# Patient Record
Sex: MALE | Race: WHITE | NOT HISPANIC OR LATINO
[De-identification: names, ages, dates, MRNs, and addresses within clinical notes are randomized per-mention and may not be internally consistent; named-entity substitution may affect disease eponyms.]

---

## 2016-04-20 RX ORDER — INSULIN LISPRO 100/ML
15 VIAL (ML) SUBCUTANEOUS
Qty: 0 | Refills: 0 | DISCHARGE
Start: 2016-04-20 | End: 2016-05-20

## 2016-04-20 RX ORDER — INSULIN LISPRO 100/ML
20 VIAL (ML) SUBCUTANEOUS
Qty: 0 | Refills: 0 | DISCHARGE
Start: 2016-04-20 | End: 2016-05-20

## 2016-04-20 RX ORDER — INSULIN GLARGINE 100 [IU]/ML
60 INJECTION, SOLUTION SUBCUTANEOUS
Qty: 0 | Refills: 0 | DISCHARGE
Start: 2016-04-20 | End: 2016-05-20

## 2016-04-20 RX ORDER — INSULIN GLARGINE 100 [IU]/ML
40 INJECTION, SOLUTION SUBCUTANEOUS
Qty: 0 | Refills: 0 | DISCHARGE
Start: 2016-04-20 | End: 2016-05-20

## 2016-04-20 RX ORDER — INSULIN LISPRO 100/ML
15 VIAL (ML) SUBCUTANEOUS
Qty: 0 | Refills: 0 | COMMUNITY
Start: 2016-04-20 | End: 2016-05-20

## 2016-04-20 RX ORDER — INSULIN GLARGINE 100 [IU]/ML
55 INJECTION, SOLUTION SUBCUTANEOUS
Qty: 0 | Refills: 0 | DISCHARGE
Start: 2016-04-20 | End: 2016-05-20

## 2016-04-20 RX ORDER — INSULIN GLARGINE 100 [IU]/ML
45 INJECTION, SOLUTION SUBCUTANEOUS
Qty: 0 | Refills: 0 | DISCHARGE
Start: 2016-04-20 | End: 2016-05-20

## 2016-04-20 RX ORDER — INSULIN LISPRO 100/ML
16 VIAL (ML) SUBCUTANEOUS
Qty: 0 | Refills: 0 | DISCHARGE
Start: 2016-04-20 | End: 2016-05-20

## 2017-01-04 ENCOUNTER — APPOINTMENT (OUTPATIENT)
Dept: INTERNAL MEDICINE | Facility: CLINIC | Age: 62
End: 2017-01-04

## 2017-01-04 RX ORDER — AZITHROMYCIN 250 MG/1
250 TABLET, FILM COATED ORAL
Qty: 6 | Refills: 0 | Status: DISCONTINUED | COMMUNITY
Start: 2016-11-01

## 2017-02-03 ENCOUNTER — APPOINTMENT (OUTPATIENT)
Dept: INTERNAL MEDICINE | Facility: CLINIC | Age: 62
End: 2017-02-03

## 2017-03-03 ENCOUNTER — APPOINTMENT (OUTPATIENT)
Dept: INTERNAL MEDICINE | Facility: CLINIC | Age: 62
End: 2017-03-03

## 2017-04-04 ENCOUNTER — APPOINTMENT (OUTPATIENT)
Dept: INTERNAL MEDICINE | Facility: CLINIC | Age: 62
End: 2017-04-04

## 2017-05-01 ENCOUNTER — APPOINTMENT (OUTPATIENT)
Dept: INTERNAL MEDICINE | Facility: CLINIC | Age: 62
End: 2017-05-01

## 2017-05-01 RX ORDER — PREDNISOLONE ACETATE 10 MG/ML
1 SUSPENSION/ DROPS OPHTHALMIC
Qty: 5 | Refills: 0 | Status: DISCONTINUED | COMMUNITY
Start: 2017-02-08

## 2017-05-01 RX ORDER — AMOXICILLIN 500 MG/1
500 CAPSULE ORAL
Qty: 21 | Refills: 0 | Status: DISCONTINUED | COMMUNITY
Start: 2017-03-07

## 2017-05-01 RX ORDER — CLOTRIMAZOLE 10 MG/G
1 CREAM TOPICAL
Qty: 28 | Refills: 0 | Status: DISCONTINUED | COMMUNITY
Start: 2017-04-14

## 2017-05-30 ENCOUNTER — APPOINTMENT (OUTPATIENT)
Dept: INTERNAL MEDICINE | Facility: CLINIC | Age: 62
End: 2017-05-30

## 2017-06-29 ENCOUNTER — APPOINTMENT (OUTPATIENT)
Dept: INTERNAL MEDICINE | Facility: CLINIC | Age: 62
End: 2017-06-29

## 2017-07-28 ENCOUNTER — RX RENEWAL (OUTPATIENT)
Age: 62
End: 2017-07-28

## 2017-07-28 ENCOUNTER — APPOINTMENT (OUTPATIENT)
Dept: INTERNAL MEDICINE | Facility: CLINIC | Age: 62
End: 2017-07-28
Payer: SELF-PAY

## 2017-07-28 PROCEDURE — 99499D: CUSTOM

## 2017-08-29 ENCOUNTER — APPOINTMENT (OUTPATIENT)
Dept: INTERNAL MEDICINE | Facility: CLINIC | Age: 62
End: 2017-08-29
Payer: SELF-PAY

## 2017-08-29 ENCOUNTER — LABORATORY RESULT (OUTPATIENT)
Age: 62
End: 2017-08-29

## 2017-08-29 PROCEDURE — 99499D: CUSTOM

## 2017-09-05 LAB
AMPHET UR-MCNC: NEGATIVE
BARBITURATES UR-MCNC: NEGATIVE
BENZODIAZ UR-MCNC: NEGATIVE
COCAINE METAB.OTHER UR-MCNC: NEGATIVE
CREATININE, URINE: 132.3 MG/DL
METHADONE UR-MCNC: NEGATIVE
METHAQUALONE UR-MCNC: NEGATIVE
OPIATES UR-MCNC: NEGATIVE
PCP UR-MCNC: NEGATIVE
PROPOXYPH UR QL: NEGATIVE
THC UR QL: NORMAL

## 2017-09-07 LAB — SUBOXONE: NORMAL

## 2017-09-29 ENCOUNTER — APPOINTMENT (OUTPATIENT)
Dept: INTERNAL MEDICINE | Facility: CLINIC | Age: 62
End: 2017-09-29
Payer: SELF-PAY

## 2017-09-29 PROCEDURE — 99499D: CUSTOM

## 2017-10-30 ENCOUNTER — APPOINTMENT (OUTPATIENT)
Dept: INTERNAL MEDICINE | Facility: CLINIC | Age: 62
End: 2017-10-30
Payer: SELF-PAY

## 2017-10-30 PROCEDURE — 99499D: CUSTOM

## 2017-11-27 ENCOUNTER — APPOINTMENT (OUTPATIENT)
Dept: INTERNAL MEDICINE | Facility: CLINIC | Age: 62
End: 2017-11-27
Payer: SELF-PAY

## 2017-11-27 PROCEDURE — 99499D: CUSTOM

## 2017-12-26 ENCOUNTER — APPOINTMENT (OUTPATIENT)
Dept: INTERNAL MEDICINE | Facility: CLINIC | Age: 62
End: 2017-12-26
Payer: SELF-PAY

## 2017-12-26 PROCEDURE — 99499D: CUSTOM

## 2018-01-26 ENCOUNTER — APPOINTMENT (OUTPATIENT)
Dept: INTERNAL MEDICINE | Facility: CLINIC | Age: 63
End: 2018-01-26

## 2018-01-26 ENCOUNTER — APPOINTMENT (OUTPATIENT)
Dept: INTERNAL MEDICINE | Facility: CLINIC | Age: 63
End: 2018-01-26
Payer: SELF-PAY

## 2018-01-26 PROCEDURE — 99499D: CUSTOM

## 2018-02-20 ENCOUNTER — RX RENEWAL (OUTPATIENT)
Age: 63
End: 2018-02-20

## 2018-03-02 ENCOUNTER — APPOINTMENT (OUTPATIENT)
Dept: INTERNAL MEDICINE | Facility: CLINIC | Age: 63
End: 2018-03-02
Payer: SELF-PAY

## 2018-03-02 PROCEDURE — 99499D: CUSTOM

## 2018-03-02 RX ORDER — CLINDAMYCIN HYDROCHLORIDE 150 MG/1
150 CAPSULE ORAL
Qty: 28 | Refills: 0 | Status: DISCONTINUED | COMMUNITY
Start: 2017-11-07

## 2018-03-02 RX ORDER — GABAPENTIN 100 MG/1
100 CAPSULE ORAL
Qty: 90 | Refills: 0 | Status: ACTIVE | COMMUNITY
Start: 2017-12-22

## 2018-03-27 ENCOUNTER — APPOINTMENT (OUTPATIENT)
Dept: INTERNAL MEDICINE | Facility: CLINIC | Age: 63
End: 2018-03-27
Payer: SELF-PAY

## 2018-03-27 PROCEDURE — 99499D: CUSTOM

## 2018-04-27 ENCOUNTER — APPOINTMENT (OUTPATIENT)
Dept: INTERNAL MEDICINE | Facility: CLINIC | Age: 63
End: 2018-04-27
Payer: SELF-PAY

## 2018-04-27 PROCEDURE — 99499D: CUSTOM

## 2018-04-28 ENCOUNTER — INPATIENT (INPATIENT)
Facility: HOSPITAL | Age: 63
LOS: 0 days | Discharge: HOME | End: 2018-04-29
Attending: INTERNAL MEDICINE | Admitting: INTERNAL MEDICINE
Payer: MEDICAID

## 2018-04-28 VITALS
TEMPERATURE: 98 F | DIASTOLIC BLOOD PRESSURE: 85 MMHG | OXYGEN SATURATION: 98 % | SYSTOLIC BLOOD PRESSURE: 147 MMHG | RESPIRATION RATE: 24 BRPM | HEART RATE: 75 BPM

## 2018-04-28 DIAGNOSIS — J44.9 CHRONIC OBSTRUCTIVE PULMONARY DISEASE, UNSPECIFIED: ICD-10-CM

## 2018-04-28 DIAGNOSIS — I10 ESSENTIAL (PRIMARY) HYPERTENSION: ICD-10-CM

## 2018-04-28 DIAGNOSIS — Z95.1 PRESENCE OF AORTOCORONARY BYPASS GRAFT: Chronic | ICD-10-CM

## 2018-04-28 DIAGNOSIS — Z98.89 OTHER SPECIFIED POSTPROCEDURAL STATES: Chronic | ICD-10-CM

## 2018-04-28 DIAGNOSIS — I20.8 OTHER FORMS OF ANGINA PECTORIS: ICD-10-CM

## 2018-04-28 DIAGNOSIS — E11.9 TYPE 2 DIABETES MELLITUS WITHOUT COMPLICATIONS: ICD-10-CM

## 2018-04-28 DIAGNOSIS — I25.10 ATHEROSCLEROTIC HEART DISEASE OF NATIVE CORONARY ARTERY WITHOUT ANGINA PECTORIS: ICD-10-CM

## 2018-04-28 LAB
ALBUMIN SERPL ELPH-MCNC: 4.3 G/DL — SIGNIFICANT CHANGE UP (ref 3.3–5)
ALP SERPL-CCNC: 62 U/L — SIGNIFICANT CHANGE UP (ref 40–120)
ALT FLD-CCNC: 23 U/L — SIGNIFICANT CHANGE UP (ref 4–41)
APPEARANCE UR: CLEAR — SIGNIFICANT CHANGE UP
APTT BLD: 29.7 SEC — SIGNIFICANT CHANGE UP (ref 27.5–37.4)
AST SERPL-CCNC: 20 U/L — SIGNIFICANT CHANGE UP (ref 4–40)
BILIRUB SERPL-MCNC: 0.2 MG/DL — SIGNIFICANT CHANGE UP (ref 0.2–1.2)
BILIRUB UR-MCNC: NEGATIVE — SIGNIFICANT CHANGE UP
BLOOD UR QL VISUAL: NEGATIVE — SIGNIFICANT CHANGE UP
BUN SERPL-MCNC: 17 MG/DL — SIGNIFICANT CHANGE UP (ref 7–23)
CALCIUM SERPL-MCNC: 9.3 MG/DL — SIGNIFICANT CHANGE UP (ref 8.4–10.5)
CHLORIDE SERPL-SCNC: 99 MMOL/L — SIGNIFICANT CHANGE UP (ref 98–107)
CK MB BLD-MCNC: 5.18 NG/ML — SIGNIFICANT CHANGE UP (ref 1–6.6)
CK MB BLD-MCNC: SIGNIFICANT CHANGE UP (ref 0–2.5)
CK SERPL-CCNC: 139 U/L — SIGNIFICANT CHANGE UP (ref 30–200)
CO2 SERPL-SCNC: 30 MMOL/L — SIGNIFICANT CHANGE UP (ref 22–31)
COLOR SPEC: YELLOW — SIGNIFICANT CHANGE UP
CREAT SERPL-MCNC: 0.84 MG/DL — SIGNIFICANT CHANGE UP (ref 0.5–1.3)
D DIMER BLD IA.RAPID-MCNC: < 150 NG/ML — SIGNIFICANT CHANGE UP
GLUCOSE BLDC GLUCOMTR-MCNC: 125 MG/DL — HIGH (ref 70–99)
GLUCOSE BLDC GLUCOMTR-MCNC: 168 MG/DL — HIGH (ref 70–99)
GLUCOSE BLDC GLUCOMTR-MCNC: 194 MG/DL — HIGH (ref 70–99)
GLUCOSE BLDC GLUCOMTR-MCNC: 88 MG/DL — SIGNIFICANT CHANGE UP (ref 70–99)
GLUCOSE SERPL-MCNC: 113 MG/DL — HIGH (ref 70–99)
GLUCOSE UR-MCNC: 150 — SIGNIFICANT CHANGE UP
HCT VFR BLD CALC: 38.4 % — LOW (ref 39–50)
HGB BLD-MCNC: 12.1 G/DL — LOW (ref 13–17)
INR BLD: 0.98 — SIGNIFICANT CHANGE UP (ref 0.88–1.17)
KETONES UR-MCNC: NEGATIVE — SIGNIFICANT CHANGE UP
LEUKOCYTE ESTERASE UR-ACNC: NEGATIVE — SIGNIFICANT CHANGE UP
MCHC RBC-ENTMCNC: 27.6 PG — SIGNIFICANT CHANGE UP (ref 27–34)
MCHC RBC-ENTMCNC: 31.5 % — LOW (ref 32–36)
MCV RBC AUTO: 87.5 FL — SIGNIFICANT CHANGE UP (ref 80–100)
MUCOUS THREADS # UR AUTO: SIGNIFICANT CHANGE UP
NITRITE UR-MCNC: NEGATIVE — SIGNIFICANT CHANGE UP
NRBC # FLD: 0 — SIGNIFICANT CHANGE UP
NT-PROBNP SERPL-SCNC: 51.7 PG/ML — SIGNIFICANT CHANGE UP
PH UR: 6.5 — SIGNIFICANT CHANGE UP (ref 4.6–8)
PLATELET # BLD AUTO: 283 K/UL — SIGNIFICANT CHANGE UP (ref 150–400)
PMV BLD: 11.1 FL — SIGNIFICANT CHANGE UP (ref 7–13)
POTASSIUM SERPL-MCNC: 4.2 MMOL/L — SIGNIFICANT CHANGE UP (ref 3.5–5.3)
POTASSIUM SERPL-SCNC: 4.2 MMOL/L — SIGNIFICANT CHANGE UP (ref 3.5–5.3)
PROT SERPL-MCNC: 7.7 G/DL — SIGNIFICANT CHANGE UP (ref 6–8.3)
PROT UR-MCNC: 30 MG/DL — HIGH
PROTHROM AB SERPL-ACNC: 10.9 SEC — SIGNIFICANT CHANGE UP (ref 9.8–13.1)
RBC # BLD: 4.39 M/UL — SIGNIFICANT CHANGE UP (ref 4.2–5.8)
RBC # FLD: 13.7 % — SIGNIFICANT CHANGE UP (ref 10.3–14.5)
RBC CASTS # UR COMP ASSIST: SIGNIFICANT CHANGE UP (ref 0–?)
SODIUM SERPL-SCNC: 139 MMOL/L — SIGNIFICANT CHANGE UP (ref 135–145)
SP GR SPEC: 1.02 — SIGNIFICANT CHANGE UP (ref 1–1.04)
TROPONIN T SERPL-MCNC: < 0.06 NG/ML — SIGNIFICANT CHANGE UP (ref 0–0.06)
TROPONIN T SERPL-MCNC: < 0.06 NG/ML — SIGNIFICANT CHANGE UP (ref 0–0.06)
UROBILINOGEN FLD QL: NORMAL MG/DL — SIGNIFICANT CHANGE UP
WBC # BLD: 9.7 K/UL — SIGNIFICANT CHANGE UP (ref 3.8–10.5)
WBC # FLD AUTO: 9.7 K/UL — SIGNIFICANT CHANGE UP (ref 3.8–10.5)
WBC UR QL: SIGNIFICANT CHANGE UP (ref 0–?)

## 2018-04-28 PROCEDURE — 71046 X-RAY EXAM CHEST 2 VIEWS: CPT | Mod: 26

## 2018-04-28 PROCEDURE — 71250 CT THORAX DX C-: CPT | Mod: 26

## 2018-04-28 RX ORDER — INSULIN GLARGINE 100 [IU]/ML
45 INJECTION, SOLUTION SUBCUTANEOUS AT BEDTIME
Qty: 0 | Refills: 0 | Status: DISCONTINUED | OUTPATIENT
Start: 2018-04-28 | End: 2018-04-29

## 2018-04-28 RX ORDER — ENOXAPARIN SODIUM 100 MG/ML
40 INJECTION SUBCUTANEOUS DAILY
Qty: 0 | Refills: 0 | Status: DISCONTINUED | OUTPATIENT
Start: 2018-04-28 | End: 2018-04-29

## 2018-04-28 RX ORDER — FUROSEMIDE 40 MG
20 TABLET ORAL ONCE
Qty: 0 | Refills: 0 | Status: COMPLETED | OUTPATIENT
Start: 2018-04-28 | End: 2018-04-28

## 2018-04-28 RX ORDER — FENOFIBRATE,MICRONIZED 130 MG
145 CAPSULE ORAL DAILY
Qty: 0 | Refills: 0 | Status: DISCONTINUED | OUTPATIENT
Start: 2018-04-28 | End: 2018-04-29

## 2018-04-28 RX ORDER — LISINOPRIL 2.5 MG/1
1 TABLET ORAL
Qty: 0 | Refills: 0 | COMMUNITY

## 2018-04-28 RX ORDER — FUROSEMIDE 40 MG
20 TABLET ORAL
Qty: 0 | Refills: 0 | Status: DISCONTINUED | OUTPATIENT
Start: 2018-04-28 | End: 2018-04-28

## 2018-04-28 RX ORDER — ALBUTEROL 90 UG/1
2 AEROSOL, METERED ORAL EVERY 6 HOURS
Qty: 0 | Refills: 0 | Status: DISCONTINUED | OUTPATIENT
Start: 2018-04-28 | End: 2018-04-29

## 2018-04-28 RX ORDER — BUDESONIDE AND FORMOTEROL FUMARATE DIHYDRATE 160; 4.5 UG/1; UG/1
2 AEROSOL RESPIRATORY (INHALATION)
Qty: 0 | Refills: 0 | Status: DISCONTINUED | OUTPATIENT
Start: 2018-04-28 | End: 2018-04-29

## 2018-04-28 RX ORDER — NICOTINE POLACRILEX 2 MG
1 GUM BUCCAL DAILY
Qty: 0 | Refills: 0 | Status: DISCONTINUED | OUTPATIENT
Start: 2018-04-28 | End: 2018-04-29

## 2018-04-28 RX ORDER — ATORVASTATIN CALCIUM 80 MG/1
40 TABLET, FILM COATED ORAL AT BEDTIME
Qty: 0 | Refills: 0 | Status: DISCONTINUED | OUTPATIENT
Start: 2018-04-28 | End: 2018-04-29

## 2018-04-28 RX ORDER — FUROSEMIDE 40 MG
20 TABLET ORAL
Qty: 0 | Refills: 0 | Status: DISCONTINUED | OUTPATIENT
Start: 2018-04-28 | End: 2018-04-29

## 2018-04-28 RX ORDER — ASPIRIN/CALCIUM CARB/MAGNESIUM 324 MG
81 TABLET ORAL DAILY
Qty: 0 | Refills: 0 | Status: DISCONTINUED | OUTPATIENT
Start: 2018-04-28 | End: 2018-04-29

## 2018-04-28 RX ORDER — METOPROLOL TARTRATE 50 MG
100 TABLET ORAL
Qty: 0 | Refills: 0 | Status: DISCONTINUED | OUTPATIENT
Start: 2018-04-28 | End: 2018-04-29

## 2018-04-28 RX ORDER — INSULIN LISPRO 100/ML
15 VIAL (ML) SUBCUTANEOUS
Qty: 0 | Refills: 0 | Status: DISCONTINUED | OUTPATIENT
Start: 2018-04-28 | End: 2018-04-29

## 2018-04-28 RX ORDER — BUPRENORPHINE AND NALOXONE 2; .5 MG/1; MG/1
0.5 TABLET SUBLINGUAL
Qty: 0 | Refills: 0 | Status: DISCONTINUED | OUTPATIENT
Start: 2018-04-28 | End: 2018-04-29

## 2018-04-28 RX ORDER — LISINOPRIL 2.5 MG/1
2.5 TABLET ORAL DAILY
Qty: 0 | Refills: 0 | Status: DISCONTINUED | OUTPATIENT
Start: 2018-04-28 | End: 2018-04-29

## 2018-04-28 RX ORDER — BUPRENORPHINE AND NALOXONE 2; .5 MG/1; MG/1
1 TABLET SUBLINGUAL
Qty: 0 | Refills: 0 | Status: DISCONTINUED | OUTPATIENT
Start: 2018-04-28 | End: 2018-04-29

## 2018-04-28 RX ORDER — GABAPENTIN 400 MG/1
100 CAPSULE ORAL THREE TIMES A DAY
Qty: 0 | Refills: 0 | Status: DISCONTINUED | OUTPATIENT
Start: 2018-04-28 | End: 2018-04-29

## 2018-04-28 RX ORDER — SODIUM CHLORIDE 9 MG/ML
3 INJECTION INTRAMUSCULAR; INTRAVENOUS; SUBCUTANEOUS EVERY 8 HOURS
Qty: 0 | Refills: 0 | Status: DISCONTINUED | OUTPATIENT
Start: 2018-04-28 | End: 2018-04-29

## 2018-04-28 RX ORDER — BUPRENORPHINE AND NALOXONE 2; .5 MG/1; MG/1
2 TABLET SUBLINGUAL DAILY
Qty: 0 | Refills: 0 | Status: DISCONTINUED | OUTPATIENT
Start: 2018-04-28 | End: 2018-04-28

## 2018-04-28 RX ORDER — ASPIRIN/CALCIUM CARB/MAGNESIUM 324 MG
162 TABLET ORAL ONCE
Qty: 0 | Refills: 0 | Status: COMPLETED | OUTPATIENT
Start: 2018-04-28 | End: 2018-04-28

## 2018-04-28 RX ADMIN — GABAPENTIN 100 MILLIGRAM(S): 400 CAPSULE ORAL at 22:11

## 2018-04-28 RX ADMIN — BUDESONIDE AND FORMOTEROL FUMARATE DIHYDRATE 2 PUFF(S): 160; 4.5 AEROSOL RESPIRATORY (INHALATION) at 22:06

## 2018-04-28 RX ADMIN — Medication 20 MILLIGRAM(S): at 18:37

## 2018-04-28 RX ADMIN — INSULIN GLARGINE 45 UNIT(S): 100 INJECTION, SOLUTION SUBCUTANEOUS at 22:51

## 2018-04-28 RX ADMIN — BUPRENORPHINE AND NALOXONE 1 TABLET(S): 2; .5 TABLET SUBLINGUAL at 13:20

## 2018-04-28 RX ADMIN — SODIUM CHLORIDE 3 MILLILITER(S): 9 INJECTION INTRAMUSCULAR; INTRAVENOUS; SUBCUTANEOUS at 13:22

## 2018-04-28 RX ADMIN — BUPRENORPHINE AND NALOXONE 1 TABLET(S): 2; .5 TABLET SUBLINGUAL at 13:50

## 2018-04-28 RX ADMIN — Medication 1 PATCH: at 13:20

## 2018-04-28 RX ADMIN — SODIUM CHLORIDE 3 MILLILITER(S): 9 INJECTION INTRAMUSCULAR; INTRAVENOUS; SUBCUTANEOUS at 22:11

## 2018-04-28 RX ADMIN — GABAPENTIN 100 MILLIGRAM(S): 400 CAPSULE ORAL at 13:26

## 2018-04-28 RX ADMIN — Medication 100 MILLIGRAM(S): at 18:37

## 2018-04-28 RX ADMIN — Medication 20 MILLIGRAM(S): at 13:20

## 2018-04-28 RX ADMIN — Medication 81 MILLIGRAM(S): at 13:20

## 2018-04-28 RX ADMIN — BUPRENORPHINE AND NALOXONE 0.5 TABLET(S): 2; .5 TABLET SUBLINGUAL at 23:45

## 2018-04-28 RX ADMIN — Medication 162 MILLIGRAM(S): at 04:17

## 2018-04-28 RX ADMIN — Medication 15 UNIT(S): at 18:25

## 2018-04-28 RX ADMIN — Medication 145 MILLIGRAM(S): at 13:20

## 2018-04-28 RX ADMIN — ENOXAPARIN SODIUM 40 MILLIGRAM(S): 100 INJECTION SUBCUTANEOUS at 18:37

## 2018-04-28 RX ADMIN — ATORVASTATIN CALCIUM 40 MILLIGRAM(S): 80 TABLET, FILM COATED ORAL at 22:11

## 2018-04-28 NOTE — ED ADULT TRIAGE NOTE - CHIEF COMPLAINT QUOTE
Pt complains of chest pressure/SOB x 2 weeks. Pt states today unable to sleep due to chest pressure, dizziness. Pt has labored breathing. Hx of CABG, COPD.

## 2018-04-28 NOTE — ED PROVIDER NOTE - ATTENDING CONTRIBUTION TO CARE
63 y/o M with h/o HTN, DM, COPD, current smoker, CAD s/p CABG x 2 here with chest pain.  Pt reports a few weeks of worsening left sided chest pressure with exertion.  Tonight pt with chest pressure and SOB when lying down to bed tonight.  No fever, cough, back pain, abd pain, n/v/d, leg pain or swelling.  No active pain at this time.  Pt took one baby aspirin prior to arrival.  Well appearing, lying comfortably in stretcher, awake and alert, nontoxic.  VSS.  Lungs cta bl.  Cards nl S1/S2, RRR, no MRG.  Abd soft ntnd.  Trace pedal edema, no unilateral swelling or calf tenderness.  Plan for ekg, labs, cxr, asa, admit tele for sxs concerning for unstable angina.

## 2018-04-28 NOTE — PATIENT PROFILE ADULT. - REASON FOR ADMISSION
Pt. states" I have pressure on the left side of chest and I have difficulty breathing when lying flat in bed on and off for 2 weeks."

## 2018-04-28 NOTE — H&P ADULT - HISTORY OF PRESENT ILLNESS
61 yo M PMHX of CAD, CABG x2, DM, HTN, and COPD. P/w TRIPATHI and chest pressure x 2 weeks off and on, patient also complains of orthopnea. Chest pain is accompanied by diaphoresis and mild sob. Patient states that chest pressure and SOB usually start with laying down flat to sleep and at times wakes him up from sleep, Pt. only uses 1 pillow at night. Chest pressure and SOB seem to dissipate on there own. Pt. has tried using his ventolin inhaler for SOB but it does not help.  Patient is currently a smoker and smokes 3 cigarettes a day. He denies any fevers, chills, cough, rhinorrhea, otorrhea, otalgia, nausea, vomiting, constipation, diarrhea or changes in urinary habits.

## 2018-04-28 NOTE — ED PROVIDER NOTE - OBJECTIVE STATEMENT
63 yo M PMHX of CAD, CABG x2, DM, HTN, and COPD p/w TRIPATHI and chest pressure heaviness x 2 weeks with last few nights of orthopnea, and heaviness in the chest. Last cath 2 years ago. Chest pain accompanied by diaphoresis and mild sob. Complains of worsening shortness of breath when laying down. Denies fevers, chills, cough, rhinorrhea, otorrhea, otalgia, nausea, vomiting, constipation, diarrhea,or changes in urinary habits.

## 2018-04-28 NOTE — H&P ADULT - PROBLEM SELECTOR PLAN 5
admit to telemetry, ECG  CE x 2   TTE ordered r/o CHF, valvular disorder   continue BB, ACE  D/w Dr. Mccurdy will give Lasix 20mg IVP x 1 then start Lasix 20mg BID

## 2018-04-28 NOTE — ED ADULT NURSE NOTE - OBJECTIVE STATEMENT
float RN- pt with CABG (on plavix) and COPD c/o worsening TRIPATHI and SOB/chest pressure while laying flat x2weeks. pt denies any chest pain, SOB, n/v/d, dizziness, visual changes, urinary symptoms, abdominal pain. pt a/ox4, vitally stable, ambulatory, on cardiac monitor, 20g placed to right AC, labs sent. MD at bedside, awaiting further orders from MD, will continue to monitor, pt safety maintained.

## 2018-04-28 NOTE — CONSULT NOTE ADULT - PROBLEM SELECTOR RECOMMENDATION 2
he is in mild copd exacebtaiton: ct chest is normal: Symbicort has been started: Start low dose steroids for a few days

## 2018-04-28 NOTE — CONSULT NOTE ADULT - SUBJECTIVE AND OBJECTIVE BOX
Patient is a 62y old  Male who presents with a chief complaint of Chest pressure and orthopnea (28 Apr 2018 10:38)      HPI:  63 yo M PMHX of CAD, CABG x2, DM, HTN, and COPD. P/w TRIPATHI and chest pressure x 2 weeks off and on, patient also complains of orthopnea. Chest pain is accompanied by diaphoresis and mild sob. Patient states that chest pressure and SOB usually start with laying down flat to sleep and at times wakes him up from sleep, Pt. only uses 1 pillow at night. Chest pressure and SOB seem to dissipate on there own. Pt. has tried using his ventolin inhaler for SOB but it does not help.  Patient is currently a smoker and smokes 3 cigarettes a day. He denies any fevers, chills, cough, rhinorrhea, otorrhea, otalgia, nausea, vomiting, constipation, diarrhea or changes in urinary habits. (28 Apr 2018 10:38)    pt also uses albuterol prn for SOB and wheezing:   He was also diagnosed with SHAYY but refused to wear cpap and hence did not go for titration study   ?FOLLOWING PRESENT  [ x] Hx of PE/DVT, [y ] Hx COPD, [ x] Hx of Asthma, [y ] Hx of Hospitalization, [x ]  Hx of BiPAP/CPAP use, [ y] Hx of SHAYY    Allergies    No Known Allergies    Intolerances        PAST MEDICAL & SURGICAL HISTORY:  Diabetic autonomic neuropathy associated with type 2 diabetes mellitus  CAD (coronary artery disease)  HTN (hypertension)  DM2 (diabetes mellitus, type 2)  COPD (chronic obstructive pulmonary disease)  S/P CABG x 2: Dr Ashley  H/O umbilical hernia repair: x2      FAMILY HISTORY:      Social History: [> 20 yrs: active  ] TOBACCO                  [ x ] ETOH                                 [  x] IVDA/DRUGS    REVIEW OF SYSTEMS      General:	x    Skin/Breast:x  	  Ophthalmologic:x  	  ENMT:	x    Respiratory and Thorax: sob, wheezing   	  Cardiovascular:	x    Gastrointestinal:	x    Genitourinary:	x    Musculoskeletal:	x    Neurological:	x    Psychiatric:x	    Hematology/Lymphatics:	 pedal radha a    Endocrine:	x    Allergic/Immunologic:	x    MEDICATIONS  (STANDING):  aspirin enteric coated 81 milliGRAM(s) Oral daily  atorvastatin 40 milliGRAM(s) Oral at bedtime  buDESOnide 160 MICROgram(s)/formoterol 4.5 MICROgram(s) Inhaler 2 Puff(s) Inhalation two times a day  buprenorphine 8 mG/naloxone 2 mG SL  Tablet 1 Tablet(s) SubLingual <User Schedule>  buprenorphine 8 mG/naloxone 2 mG SL  Tablet 0.5 Tablet(s) SubLingual <User Schedule>  fenofibrate Tablet 145 milliGRAM(s) Oral daily  furosemide    Tablet 20 milliGRAM(s) Oral two times a day  furosemide   Injectable 20 milliGRAM(s) IV Push once  gabapentin 100 milliGRAM(s) Oral three times a day  insulin glargine Injectable (LANTUS) 45 Unit(s) SubCutaneous at bedtime  insulin lispro Injectable (HumaLOG) 15 Unit(s) SubCutaneous three times a day before meals  lisinopril 2.5 milliGRAM(s) Oral daily  metoprolol tartrate 100 milliGRAM(s) Oral two times a day  nicotine -  14 mG/24Hr(s) Patch 1 patch Transdermal daily    MEDICATIONS  (PRN):  ALBUTerol    90 MICROgram(s) HFA Inhaler 2 Puff(s) Inhalation every 6 hours PRN Shortness of Breath and/or Wheezing  sodium chloride 0.9% lock flush 3 milliLiter(s) IV Push every 8 hours PRN flush       Vital Signs Last 24 Hrs  T(C): 37.1 (28 Apr 2018 10:38), Max: 37.1 (28 Apr 2018 06:50)  T(F): 98.7 (28 Apr 2018 10:38), Max: 98.7 (28 Apr 2018 06:50)  HR: 60 (28 Apr 2018 10:38) (60 - 75)  BP: 108/64 (28 Apr 2018 10:38) (107/71 - 147/85)  BP(mean): 78 (28 Apr 2018 10:38) (78 - 78)  RR: 19 (28 Apr 2018 10:38) (17 - 24)  SpO2: 100% (28 Apr 2018 10:38) (94% - 100%)        I&O's Summary      Physical Exam:   GENERAL: NAD, well-groomed, well-developed  HEENT: JOSÉ LUIS/   Atraumatic, Normocephalic  ENMT: No tonsillar erythema, exudates, or enlargement; Moist mucous membranes, Good dentition, No lesions  NECK: Supple, No JVD, Normal thyroid  CHEST/LUNG: mild wheezing   CVS: Regular rate and rhythm; No murmurs, rubs, or gallops  GI: : Soft, Nontender, Nondistended; Bowel sounds present  NERVOUS SYSTEM:  Alert & Oriented X3  EXTREMITIES:  2+ Peripheral Pulses, No clubbing, cyanosis, or edema  LYMPH: No lymphadenopathy noted  SKIN: No rashes or lesions  ENDOCRINOLOGY: No Thyromegaly  PSYCH: Appropriate    Labs:    CARDIAC MARKERS ( 28 Apr 2018 03:45 )  x     / < 0.06 ng/mL / x     / x     / x                                12.1   9.70  )-----------( 283      ( 28 Apr 2018 03:45 )             38.4     04-28    139  |  99  |  17  ----------------------------<  113<H>  4.2   |  30  |  0.84    Ca    9.3      28 Apr 2018 03:45    TPro  7.7  /  Alb  4.3  /  TBili  0.2  /  DBili  x   /  AST  20  /  ALT  23  /  AlkPhos  62  04-28    CAPILLARY BLOOD GLUCOSE      POCT Blood Glucose.: 88 mg/dL (28 Apr 2018 08:41)    LIVER FUNCTIONS - ( 28 Apr 2018 03:45 )  Alb: 4.3 g/dL / Pro: 7.7 g/dL / ALK PHOS: 62 u/L / ALT: 23 u/L / AST: 20 u/L / GGT: x           PT/INR - ( 28 Apr 2018 03:45 )   PT: 10.9 SEC;   INR: 0.98          PTT - ( 28 Apr 2018 03:45 )  PTT:29.7 SEC    D DImer  Serum Pro-Brain Natriuretic Peptide: 51.70 pg/mL (04-28 @ 03:45)    Cultures:     < from: CT Chest No Cont (04.28.18 @ 10:48) >  EXAM:  CT CHEST        PROCEDURE DATE:  Apr 28 2018         INTERPRETATION:  CLINICAL INFORMATION: Shortness of breath    COMPARISON: Chest CT dated 4/18/2016    PROCEDURE:   CT of the Chest was performed without intravenous contrast.  Sagittal andcoronal reformats were performed.    FINDINGS:    CHEST:     LUNGS AND LARGE AIRWAYS: Patent central airways. No pulmonary nodules or   parenchymal consolidation.  PLEURA: No pleural effusion.  VESSELS: Thoracic aortic and coronary arteriosclerosis. Status post CABG.  HEART: Heart size is normal. No pericardial effusion.  MEDIASTINUM AND BETTE: No lymphadenopathy.  CHEST WALL AND LOWER NECK: Within normal limits.  VISUALIZED UPPER ABDOMEN: Hepatic steatosis. Small right renal cyst.  BONES: Status post sternotomy. Multilevel degenerative disease of the   spine.    IMPRESSION: No acute pathology.                  SHELBY RICHARDS M.D., RADIOLOGY RESIDENT  This document has been electronically signed.  MESHA LAWSON M.D., ATTENDING RADIOLOGIST  This document has been electronically signed. Apr 28 2018 11:01AM              < end of copied text >                        Studies  Chest X-RAY  CT SCAN Chest   CT Abdomen  Venous Dopplers: LE:   Others

## 2018-04-28 NOTE — ED ADULT NURSE NOTE - PMH
CAD (coronary artery disease)    COPD (chronic obstructive pulmonary disease)    Diabetic autonomic neuropathy associated with type 2 diabetes mellitus    DM2 (diabetes mellitus, type 2)    HTN (hypertension)

## 2018-04-28 NOTE — CONSULT NOTE ADULT - ASSESSMENT
61 yo M PMHX of CAD, CABG x2, DM, HTN, and COPD. P/w TRIPATHI and chest pressure x 2 weeks off and on, patient also complains of orthopnea. Chest pain is accompanied by diaphoresis and mild sob. Patient states that chest pressure and SOB usually start with laying down flat to sleep and at times wakes him up from sleep, Pt. only uses 1 pillow at night. Chest pressure and SOB seem to dissipate on there own. Pt. has tried using his ventolin inhaler for SOB but it does not help.  Patient is currently a smoker and smokes 3 cigarettes a day. He denies any fevers, chills, cough, rhinorrhea, otorrhea, otalgia, nausea, vomiting, constipation, diarrhea or changes in urinary habits. R/O ACS

## 2018-04-28 NOTE — H&P ADULT - ASSESSMENT
63 yo M PMHX of CAD, CABG x2, DM, HTN, and COPD. P/w TRIPATHI and chest pressure x 2 weeks off and on, patient also complains of orthopnea. Chest pain is accompanied by diaphoresis and mild sob. Patient states that chest pressure and SOB usually start with laying down flat to sleep and at times wakes him up from sleep, Pt. only uses 1 pillow at night. Chest pressure and SOB seem to dissipate on there own. Pt. has tried using his ventolin inhaler for SOB but it does not help.  Patient is currently a smoker and smokes 3 cigarettes a day. He denies any fevers, chills, cough, rhinorrhea, otorrhea, otalgia, nausea, vomiting, constipation, diarrhea or changes in urinary habits. R/O ACS

## 2018-04-29 ENCOUNTER — TRANSCRIPTION ENCOUNTER (OUTPATIENT)
Age: 63
End: 2018-04-29

## 2018-04-29 VITALS — WEIGHT: 273.37 LBS

## 2018-04-29 LAB
BUN SERPL-MCNC: 19 MG/DL — SIGNIFICANT CHANGE UP (ref 7–23)
CALCIUM SERPL-MCNC: 9.5 MG/DL — SIGNIFICANT CHANGE UP (ref 8.4–10.5)
CHLORIDE SERPL-SCNC: 97 MMOL/L — LOW (ref 98–107)
CHOLEST SERPL-MCNC: 131 MG/DL — SIGNIFICANT CHANGE UP (ref 120–199)
CO2 SERPL-SCNC: 33 MMOL/L — HIGH (ref 22–31)
CREAT SERPL-MCNC: 0.96 MG/DL — SIGNIFICANT CHANGE UP (ref 0.5–1.3)
GLUCOSE BLDC GLUCOMTR-MCNC: 98 MG/DL — SIGNIFICANT CHANGE UP (ref 70–99)
GLUCOSE SERPL-MCNC: 101 MG/DL — HIGH (ref 70–99)
HBA1C BLD-MCNC: 7.6 % — HIGH (ref 4–5.6)
HCT VFR BLD CALC: 41.1 % — SIGNIFICANT CHANGE UP (ref 39–50)
HDLC SERPL-MCNC: 39 MG/DL — SIGNIFICANT CHANGE UP (ref 35–55)
HGB BLD-MCNC: 12.8 G/DL — LOW (ref 13–17)
LIPID PNL WITH DIRECT LDL SERPL: 74 MG/DL — SIGNIFICANT CHANGE UP
MAGNESIUM SERPL-MCNC: 2.1 MG/DL — SIGNIFICANT CHANGE UP (ref 1.6–2.6)
MCHC RBC-ENTMCNC: 27.5 PG — SIGNIFICANT CHANGE UP (ref 27–34)
MCHC RBC-ENTMCNC: 31.1 % — LOW (ref 32–36)
MCV RBC AUTO: 88.2 FL — SIGNIFICANT CHANGE UP (ref 80–100)
NRBC # FLD: 0 — SIGNIFICANT CHANGE UP
PLATELET # BLD AUTO: 281 K/UL — SIGNIFICANT CHANGE UP (ref 150–400)
PMV BLD: 11.5 FL — SIGNIFICANT CHANGE UP (ref 7–13)
POTASSIUM SERPL-MCNC: 3.6 MMOL/L — SIGNIFICANT CHANGE UP (ref 3.5–5.3)
POTASSIUM SERPL-SCNC: 3.6 MMOL/L — SIGNIFICANT CHANGE UP (ref 3.5–5.3)
RBC # BLD: 4.66 M/UL — SIGNIFICANT CHANGE UP (ref 4.2–5.8)
RBC # FLD: 13.7 % — SIGNIFICANT CHANGE UP (ref 10.3–14.5)
SODIUM SERPL-SCNC: 140 MMOL/L — SIGNIFICANT CHANGE UP (ref 135–145)
TRIGL SERPL-MCNC: 101 MG/DL — SIGNIFICANT CHANGE UP (ref 10–149)
WBC # BLD: 8.13 K/UL — SIGNIFICANT CHANGE UP (ref 3.8–10.5)
WBC # FLD AUTO: 8.13 K/UL — SIGNIFICANT CHANGE UP (ref 3.8–10.5)

## 2018-04-29 RX ORDER — BUDESONIDE AND FORMOTEROL FUMARATE DIHYDRATE 160; 4.5 UG/1; UG/1
2 AEROSOL RESPIRATORY (INHALATION)
Qty: 1 | Refills: 0
Start: 2018-04-29 | End: 2018-05-28

## 2018-04-29 RX ORDER — GABAPENTIN 400 MG/1
1 CAPSULE ORAL
Qty: 0 | Refills: 0 | DISCHARGE
Start: 2018-04-29

## 2018-04-29 RX ORDER — METOPROLOL TARTRATE 50 MG
1 TABLET ORAL
Qty: 0 | Refills: 0 | DISCHARGE
Start: 2018-04-29

## 2018-04-29 RX ORDER — ASPIRIN/CALCIUM CARB/MAGNESIUM 324 MG
1 TABLET ORAL
Qty: 0 | Refills: 0 | DISCHARGE
Start: 2018-04-29

## 2018-04-29 RX ORDER — FENOFIBRATE,MICRONIZED 130 MG
1 CAPSULE ORAL
Qty: 0 | Refills: 0 | DISCHARGE
Start: 2018-04-29

## 2018-04-29 RX ORDER — ATORVASTATIN CALCIUM 80 MG/1
1 TABLET, FILM COATED ORAL
Qty: 0 | Refills: 0 | DISCHARGE
Start: 2018-04-29

## 2018-04-29 RX ORDER — FUROSEMIDE 40 MG
1 TABLET ORAL
Qty: 30 | Refills: 0 | OUTPATIENT
Start: 2018-04-29 | End: 2018-05-28

## 2018-04-29 RX ORDER — ALBUTEROL 90 UG/1
2 AEROSOL, METERED ORAL
Qty: 0 | Refills: 0 | DISCHARGE
Start: 2018-04-29

## 2018-04-29 RX ORDER — LISINOPRIL 2.5 MG/1
1 TABLET ORAL
Qty: 30 | Refills: 0 | OUTPATIENT
Start: 2018-04-29 | End: 2018-05-28

## 2018-04-29 RX ADMIN — BUPRENORPHINE AND NALOXONE 0.5 TABLET(S): 2; .5 TABLET SUBLINGUAL at 00:15

## 2018-04-29 RX ADMIN — Medication 20 MILLIGRAM(S): at 06:11

## 2018-04-29 RX ADMIN — Medication 100 MILLIGRAM(S): at 06:11

## 2018-04-29 RX ADMIN — BUPRENORPHINE AND NALOXONE 1 TABLET(S): 2; .5 TABLET SUBLINGUAL at 07:56

## 2018-04-29 RX ADMIN — LISINOPRIL 2.5 MILLIGRAM(S): 2.5 TABLET ORAL at 06:11

## 2018-04-29 RX ADMIN — BUPRENORPHINE AND NALOXONE 1 TABLET(S): 2; .5 TABLET SUBLINGUAL at 08:30

## 2018-04-29 RX ADMIN — GABAPENTIN 100 MILLIGRAM(S): 400 CAPSULE ORAL at 06:11

## 2018-04-29 NOTE — DISCHARGE NOTE ADULT - HOSPITAL COURSE
63 yo M PMHX of CAD, CABG x2, DM, HTN, and COPD. P/w TRIPATHI and chest pressure x 2 weeks off and on, patient also complains of orthopnea. Chest pain is accompanied by diaphoresis and mild sob. Patient states that chest pressure and SOB usually start with laying down flat to sleep and at times wakes him up from sleep, Pt. only uses 1 pillow at night. Chest pressure and SOB seem to dissipate on there own. Pt. has tried using his ventolin inhaler for SOB but it does not help.  Patient is currently a smoker and smokes 3 cigarettes a day. He denies any fevers, chills, cough, rhinorrhea, otorrhea, otalgia, nausea, vomiting, constipation, diarrhea or changes in urinary habits. R/O ACS   On 4/28 pulmonary consulted for-  mild copd exacerbation: ct chest is normal: Symbicort has been started: Start low dose steroids for a few days. As per discussion with pulm will discharge home to complete 5 days of steroids  As per Dr. Mccurdy patient stable for discharge home on [id number] daily and is scheduled for echocardiogram this week in the office .   Stable for discharge home today 63 yo M PMHX of CAD, CABG x2, DM, HTN, and COPD. P/w TRIPATHI and chest pressure x 2 weeks off and on, patient also complains of orthopnea. Chest pain is accompanied by diaphoresis and mild sob. Patient states that chest pressure and SOB usually start with laying down flat to sleep and at times wakes him up from sleep, Pt. only uses 1 pillow at night. Chest pressure and SOB seem to dissipate on there own. Pt. has tried using his ventolin inhaler for SOB but it does not help.  Patient is currently a smoker and smokes 3 cigarettes a day. He denies any fevers, chills, cough, rhinorrhea, otorrhea, otalgia, nausea, vomiting, constipation, diarrhea or changes in urinary habits. R/O ACS   On 4/28 pulmonary consulted for-  mild copd exacerbation: ct chest is normal: Symbicort has been started: Start low dose steroids for a few days. As per discussion with pulm will discharge home to complete 5 days of steroids  As per Dr. Mccurdy patient stable for discharge home on [id number] daily for likely diastolic heart failure Scheduled for echocardiogram this week in the office .

## 2018-04-29 NOTE — PROGRESS NOTE ADULT - SUBJECTIVE AND OBJECTIVE BOX
Subjective: Patient seen and examined. No new events except as noted.     SUBJECTIVE/ROS:  No chest pain, dyspnea, palpitation, or dizziness.       MEDICATIONS:  MEDICATIONS  (STANDING):  aspirin enteric coated 81 milliGRAM(s) Oral daily  atorvastatin 40 milliGRAM(s) Oral at bedtime  buDESOnide 160 MICROgram(s)/formoterol 4.5 MICROgram(s) Inhaler 2 Puff(s) Inhalation two times a day  buprenorphine 8 mG/naloxone 2 mG SL  Tablet 1 Tablet(s) SubLingual <User Schedule>  buprenorphine 8 mG/naloxone 2 mG SL  Tablet 0.5 Tablet(s) SubLingual <User Schedule>  enoxaparin Injectable 40 milliGRAM(s) SubCutaneous daily  fenofibrate Tablet 145 milliGRAM(s) Oral daily  furosemide    Tablet 20 milliGRAM(s) Oral two times a day  gabapentin 100 milliGRAM(s) Oral three times a day  insulin glargine Injectable (LANTUS) 45 Unit(s) SubCutaneous at bedtime  insulin lispro Injectable (HumaLOG) 15 Unit(s) SubCutaneous three times a day before meals  lisinopril 2.5 milliGRAM(s) Oral daily  metoprolol tartrate 100 milliGRAM(s) Oral two times a day  nicotine -  14 mG/24Hr(s) Patch 1 patch Transdermal daily  predniSONE   Tablet 20 milliGRAM(s) Oral daily      PHYSICAL EXAM:  T(C): 36.7 (04-29-18 @ 06:10), Max: 37.1 (04-28-18 @ 10:38)  HR: 75 (04-29-18 @ 06:10) (60 - 79)  BP: 136/83 (04-29-18 @ 06:10) (107/71 - 141/73)  RR: 18 (04-29-18 @ 06:10) (17 - 20)  SpO2: 98% (04-29-18 @ 06:10) (94% - 100%)  Wt(kg): --  I&O's Summary    28 Apr 2018 07:01  -  29 Apr 2018 07:00  --------------------------------------------------------  IN: 300 mL / OUT: 0 mL / NET: 300 mL      Height (cm): 180.34 (04-28 @ 23:05)  Weight (kg): 124.4 (04-28 @ 23:05)  BMI (kg/m2): 38.3 (04-28 @ 23:05)  BSA (m2): 2.41 (04-28 @ 23:05)    Appearance: Normal	  HEENT:   Normal oral mucosa, PERRL, EOMI	  Cardiovascular: Normal S1 S2,    Murmur:   Neck: JVP normal  Respiratory: Lungs clear to auscultation  Gastrointestinal:  Soft, Non-tender, + BS	  Skin: normal   Neuro: No gross deficits.   Psychiatry:  Mood & affect appropriate  Ext: No edema      LABS/DATA:    CARDIAC MARKERS:  CARDIAC MARKERS ( 28 Apr 2018 12:00 )  x     / < 0.06 ng/mL / 139 u/L / -- ng/mL / x      CARDIAC MARKERS ( 28 Apr 2018 03:45 )  x     / < 0.06 ng/mL / x     / x     / x                                    12.8   8.13  )-----------( 281      ( 29 Apr 2018 06:30 )             41.1     04-29    140  |  97<L>  |  19  ----------------------------<  101<H>  3.6   |  33<H>  |  0.96    Ca    9.5      29 Apr 2018 06:30  Mg     2.1     04-29    TPro  7.7  /  Alb  4.3  /  TBili  0.2  /  DBili  x   /  AST  20  /  ALT  23  /  AlkPhos  62  04-28    proBNP:   Lipid Profile:   HgA1c: Hemoglobin A1C, Whole Blood: 7.6 % (04-29 @ 06:30)    TSH:     TELE:  EKG:

## 2018-04-29 NOTE — DISCHARGE NOTE ADULT - MEDICATION SUMMARY - MEDICATIONS TO TAKE
I will START or STAY ON the medications listed below when I get home from the hospital:    predniSONE 20 mg oral tablet  -- 1 tab(s) by mouth once a day  -- Indication: For COPD (chronic obstructive pulmonary disease)    Suboxone 8 mg-2 mg sublingual tablet  -- 2 tab(s) under tongue once a day  -- Indication: For Home medication    aspirin 81 mg oral delayed release tablet  -- 1 tab(s) by mouth once a day  -- Indication: For Home medication    lisinopril 2.5 mg oral tablet  -- 1 tab(s) by mouth once a day  -- Indication: For HTN (hypertension)    gabapentin 100 mg oral capsule  -- 1 cap(s) by mouth 3 times a day  -- Indication: For DM2 (diabetes mellitus, type 2)    insulin glargine  -- 45 unit(s) subcutaneous once a day (at bedtime)  -- Indication: For DM2 (diabetes mellitus, type 2)    insulin lispro 100 units/mL subcutaneous solution  -- 15 unit(s) subcutaneous 3 times a day (before meals)  -- Indication: For DM2 (diabetes mellitus, type 2)    atorvastatin 40 mg oral tablet  -- 1 tab(s) by mouth once a day (at bedtime)  -- Indication: For Hyperlipidemia    fenofibrate 145 mg oral tablet  -- 1 tab(s) by mouth once a day  -- Indication: For Hyperlipidemia    metoprolol tartrate 100 mg oral tablet  -- 1 tab(s) by mouth 2 times a day  -- Indication: For HTN (hypertension)    albuterol 90 mcg/inh inhalation aerosol  -- 2 puff(s) inhaled every 6 hours, As needed, Shortness of Breath and/or Wheezing  -- Indication: For COPD (chronic obstructive pulmonary disease)    budesonide-formoterol 160 mcg-4.5 mcg/inh inhalation aerosol  -- 2 puff(s) inhaled 2 times a day   -- Indication: For COPD (chronic obstructive pulmonary disease)    furosemide 20 mg oral tablet  -- 1 tab(s) by mouth once a day   -- Indication: For likely heart failure    nicotine 14 mg/24 hr transdermal film, extended release  -- 1 patch by transdermal patch once a day  -- Indication: For smoking cessation

## 2018-04-29 NOTE — DISCHARGE NOTE ADULT - CARE PLAN
Principal Discharge DX:	COPD (chronic obstructive pulmonary disease)  Goal:	To slow down the progression of the disease by quitting smoking and avoiding triggers, such as air pollution.  Continue current medications as prescribed to prevent as shortness of breath, COPD exacerbation and to improve your overall health with regular activity and quality of life.  Follow up routine appointment.  Assessment and plan of treatment:	Smoking cessation, continue current medications as prescribed. Follow up with your routine physician's appointments.   follow up with Dr. Porter in 2 weeks  Secondary Diagnosis:	DM2 (diabetes mellitus, type 2)  Goal:	To maintain a normal blood glucose level and HgA1C level < 5.7 and to prevent diabetic complications such as uncontrolled diabetes, diabetic coma, blindness, renal failure, and amputations.  Assessment and plan of treatment:	Monitor finger sticks pre-meal and bedtime, low salt, fat and carbohydrate diet, minimize glucose intake.  Exercise daily for at least 30 minutes and weight loss.  Follow up with primary care physician and endocrinologist for routine Hemoglobin A1C checks and management.  Follow up with your ophthalmologist for routine yearly vision exams.  Secondary Diagnosis:	CAD (coronary artery disease)  Goal:	To be asymptomatic, to reduce risks factors such as hypertension, diabetes and hyperlipidemia to lower the risk of blood clots formation; and to prevent complications of coronary artery disease such as worsening chest pain, heart attack and death.  Assessment and plan of treatment:	Continue aspirin and Plavix, do not stop unless instructed by your physician.  Continue low salt, fat, cholesterol and carbohydrate diet. Follow up with Dr. Mccurdy within 1 week for echocardiogram  Secondary Diagnosis:	HTN (hypertension)  Goal:	To maintain a normal blood pressure to prevent heart attack, stroke and renal failure.  Assessment and plan of treatment:	Low sodium and fat diet, continue anti-hypertensive medications, and follow up with primary care physician.

## 2018-04-29 NOTE — PROGRESS NOTE ADULT - ASSESSMENT
SOB  resolved  likely due to mild diastolic chf  cont diuresis with lasix 20 daily  CT normal  DDIMER normal    DC home and follow up as outpt

## 2018-04-29 NOTE — PROGRESS NOTE ADULT - SUBJECTIVE AND OBJECTIVE BOX
Patient is a 62y old  Male who presents with a chief complaint of Pt. states" I have pressure on the left side of chest and I have difficulty breathing when lying flat in bed on and off for 2 weeks." (2018 09:56)      Any change in ROS: Doing much better  wheezing has resolved:  SOB is better:  D Dimer is negative:     MEDICATIONS  (STANDING):  aspirin enteric coated 81 milliGRAM(s) Oral daily  atorvastatin 40 milliGRAM(s) Oral at bedtime  buDESOnide 160 MICROgram(s)/formoterol 4.5 MICROgram(s) Inhaler 2 Puff(s) Inhalation two times a day  buprenorphine 8 mG/naloxone 2 mG SL  Tablet 1 Tablet(s) SubLingual <User Schedule>  buprenorphine 8 mG/naloxone 2 mG SL  Tablet 0.5 Tablet(s) SubLingual <User Schedule>  enoxaparin Injectable 40 milliGRAM(s) SubCutaneous daily  fenofibrate Tablet 145 milliGRAM(s) Oral daily  furosemide    Tablet 20 milliGRAM(s) Oral two times a day  gabapentin 100 milliGRAM(s) Oral three times a day  insulin glargine Injectable (LANTUS) 45 Unit(s) SubCutaneous at bedtime  insulin lispro Injectable (HumaLOG) 15 Unit(s) SubCutaneous three times a day before meals  lisinopril 2.5 milliGRAM(s) Oral daily  metoprolol tartrate 100 milliGRAM(s) Oral two times a day  nicotine -  14 mG/24Hr(s) Patch 1 patch Transdermal daily  predniSONE   Tablet 20 milliGRAM(s) Oral daily    MEDICATIONS  (PRN):  ALBUTerol    90 MICROgram(s) HFA Inhaler 2 Puff(s) Inhalation every 6 hours PRN Shortness of Breath and/or Wheezing  sodium chloride 0.9% lock flush 3 milliLiter(s) IV Push every 8 hours PRN flush    Vital Signs Last 24 Hrs  T(C): 36.7 (2018 06:10), Max: 36.8 (2018 23:05)  T(F): 98.1 (2018 06:10), Max: 98.3 (2018 23:05)  HR: 75 (2018 06:10) (73 - 79)  BP: 136/83 (2018 06:10) (116/72 - 141/73)  BP(mean): --  RR: 18 (2018 06:10) (18 - 20)  SpO2: 98% (2018 06:10) (96% - 99%)    I&O's Summary    2018 07:01  -  2018 07:00  --------------------------------------------------------  IN: 300 mL / OUT: 0 mL / NET: 300 mL          Physical Exam:   GENERAL: NAD, well-groomed, well-developed  HEENT: JOSÉ LUIS/   Atraumatic, Normocephalic  ENMT: No tonsillar erythema, exudates, or enlargement; Moist mucous membranes, Good dentition, No lesions  NECK: Supple, No JVD, Normal thyroid  CHEST/LUNG: Clear to auscultaion, ; No rales, rhonchi, wheezing, or rubs  CVS: Regular rate and rhythm; No murmurs, rubs, or gallops  GI: : Soft, Nontender, Nondistended; Bowel sounds present  NERVOUS SYSTEM:  Alert & Oriented X3, Good concentration; Motor Strength 5/5 B/L upper and lower extremities; DTRs 2+ intact and symmetric  EXTREMITIES:  2+ Peripheral Pulses, No clubbing, cyanosis, or edema  LYMPH: No lymphadenopathy noted  SKIN: No rashes or lesions  ENDOCRINOLOGY: No Thyromegaly  PSYCH: Appropriate    Labs:    CARDIAC MARKERS ( 2018 12:00 )  x     / < 0.06 ng/mL / 139 u/L / -- ng/mL / x      CARDIAC MARKERS ( 2018 03:45 )  x     / < 0.06 ng/mL / x     / x     / x                                12.8   8.13  )-----------( 281      ( 2018 06:30 )             41.1                         12.1   9.70  )-----------( 283      ( 2018 03:45 )             38.4     04-29    140  |  97<L>  |  19  ----------------------------<  101<H>  3.6   |  33<H>  |  0.96      139  |  99  |  17  ----------------------------<  113<H>  4.2   |  30  |  0.84    Ca    9.5      2018 06:30  Ca    9.3      2018 03:45  Mg     2.1         TPro  7.7  /  Alb  4.3  /  TBili  0.2  /  DBili  x   /  AST  20  /  ALT  23  /  AlkPhos  62      CAPILLARY BLOOD GLUCOSE      POCT Blood Glucose.: 98 mg/dL (2018 08:53)  POCT Blood Glucose.: 194 mg/dL (2018 22:46)  POCT Blood Glucose.: 168 mg/dL (2018 17:51)  POCT Blood Glucose.: 125 mg/dL (2018 12:56)      LIVER FUNCTIONS - ( 2018 03:45 )  Alb: 4.3 g/dL / Pro: 7.7 g/dL / ALK PHOS: 62 u/L / ALT: 23 u/L / AST: 20 u/L / GGT: x           PT/INR - ( 2018 03:45 )   PT: 10.9 SEC;   INR: 0.98          PTT - ( 2018 03:45 )  PTT:29.7 SEC  Urinalysis Basic - ( 2018 20:51 )    Color: YELLOW / Appearance: CLEAR / S.022 / pH: 6.5  Gluc: 150 / Ketone: NEGATIVE  / Bili: NEGATIVE / Urobili: NORMAL mg/dL   Blood: NEGATIVE / Protein: 30 mg/dL / Nitrite: NEGATIVE   Leuk Esterase: NEGATIVE / RBC: 0-2 / WBC 0-2   Sq Epi: x / Non Sq Epi: x / Bacteria: x      D-Dimer Assay, Quantitative: < 150 ng/mL ( @ 12:30)  Serum Pro-Brain Natriuretic Peptide: 51.70 pg/mL ( @ 03:45)  Cultures:       < from: CT Chest No Cont (18 @ 10:48) >    LUNGS AND LARGE AIRWAYS: Patent central airways. No pulmonary nodules or   parenchymal consolidation.  PLEURA: No pleural effusion.  VESSELS: Thoracic aortic and coronary arteriosclerosis. Status post CABG.  HEART: Heart size is normal. No pericardial effusion.  MEDIASTINUM AND BETTE: No lymphadenopathy.  CHEST WALL AND LOWER NECK: Within normal limits.  VISUALIZED UPPER ABDOMEN: Hepatic steatosis. Small right renal cyst.  BONES: Status post sternotomy. Multilevel degenerative disease of the   spine.    IMPRESSION: No acute pathology.                  SHELBY RICHARDS M.D., RADIOLOGY RESIDENT  This document has been electronically signed.  MESHA LAWSON M.D., ATTENDING RADIOLOGIST  This document has been electronically signed. 2018 11:01AM        < end of copied text >                        Studies  Chest X-RAY  CT SCAN Chest   Venous Dopplers: LE:   CT Abdomen  Others

## 2018-04-29 NOTE — PROGRESS NOTE ADULT - PROBLEM SELECTOR PLAN 1
finish 4 more days of steroids: His breathing is much better : Outpt PFT: He has sleep apnea but does not want to use cpap machine: He has been strongly advised to lose weight and go for cpap titration

## 2018-04-29 NOTE — DISCHARGE NOTE ADULT - PLAN OF CARE
To slow down the progression of the disease by quitting smoking and avoiding triggers, such as air pollution.  Continue current medications as prescribed to prevent as shortness of breath, COPD exacerbation and to improve your overall health with regular activity and quality of life.  Follow up routine appointment. Smoking cessation, continue current medications as prescribed. Follow up with your routine physician's appointments.   follow up with Dr. Porter in 2 weeks To maintain a normal blood glucose level and HgA1C level < 5.7 and to prevent diabetic complications such as uncontrolled diabetes, diabetic coma, blindness, renal failure, and amputations. Monitor finger sticks pre-meal and bedtime, low salt, fat and carbohydrate diet, minimize glucose intake.  Exercise daily for at least 30 minutes and weight loss.  Follow up with primary care physician and endocrinologist for routine Hemoglobin A1C checks and management.  Follow up with your ophthalmologist for routine yearly vision exams. To be asymptomatic, to reduce risks factors such as hypertension, diabetes and hyperlipidemia to lower the risk of blood clots formation; and to prevent complications of coronary artery disease such as worsening chest pain, heart attack and death. Continue aspirin and Plavix, do not stop unless instructed by your physician.  Continue low salt, fat, cholesterol and carbohydrate diet. Follow up with Dr. Mccurdy within 1 week for echocardiogram To maintain a normal blood pressure to prevent heart attack, stroke and renal failure. Low sodium and fat diet, continue anti-hypertensive medications, and follow up with primary care physician.

## 2018-04-29 NOTE — DISCHARGE NOTE ADULT - PATIENT PORTAL LINK FT
You can access the [facility] Patient Portal, offered by [facility], by registering with the following website: [email or web address]

## 2018-06-01 ENCOUNTER — APPOINTMENT (OUTPATIENT)
Dept: INTERNAL MEDICINE | Facility: CLINIC | Age: 63
End: 2018-06-01
Payer: SELF-PAY

## 2018-06-01 PROCEDURE — 99499D: CUSTOM

## 2018-06-01 RX ORDER — BUDESONIDE AND FORMOTEROL FUMARATE DIHYDRATE 160; 4.5 UG/1; UG/1
160-4.5 AEROSOL RESPIRATORY (INHALATION)
Qty: 10 | Refills: 0 | Status: ACTIVE | COMMUNITY
Start: 2018-04-29

## 2018-06-01 RX ORDER — LANCETS 28 GAUGE
81 EACH MISCELLANEOUS
Qty: 30 | Refills: 0 | Status: ACTIVE | COMMUNITY
Start: 2017-12-22

## 2018-06-01 RX ORDER — PREDNISONE 20 MG/1
20 TABLET ORAL
Qty: 3 | Refills: 0 | Status: DISCONTINUED | COMMUNITY
Start: 2018-04-29

## 2018-06-22 ENCOUNTER — RX RENEWAL (OUTPATIENT)
Age: 63
End: 2018-06-22

## 2018-06-29 ENCOUNTER — APPOINTMENT (OUTPATIENT)
Dept: INTERNAL MEDICINE | Facility: CLINIC | Age: 63
End: 2018-06-29
Payer: SELF-PAY

## 2018-06-29 PROCEDURE — 99499D: CUSTOM

## 2018-07-27 ENCOUNTER — APPOINTMENT (OUTPATIENT)
Dept: INTERNAL MEDICINE | Facility: CLINIC | Age: 63
End: 2018-07-27
Payer: SELF-PAY

## 2018-07-27 PROCEDURE — 99499D: CUSTOM

## 2018-08-28 ENCOUNTER — APPOINTMENT (OUTPATIENT)
Dept: INTERNAL MEDICINE | Facility: CLINIC | Age: 63
End: 2018-08-28
Payer: SELF-PAY

## 2018-08-28 ENCOUNTER — LABORATORY RESULT (OUTPATIENT)
Age: 63
End: 2018-08-28

## 2018-08-28 PROCEDURE — 99499D: CUSTOM

## 2018-09-03 LAB — SUBOXONE: NORMAL

## 2018-09-05 ENCOUNTER — EMERGENCY (EMERGENCY)
Facility: HOSPITAL | Age: 63
LOS: 1 days | Discharge: HOME | End: 2018-09-05
Admitting: EMERGENCY MEDICINE

## 2018-09-05 VITALS
SYSTOLIC BLOOD PRESSURE: 141 MMHG | RESPIRATION RATE: 18 BRPM | HEART RATE: 105 BPM | OXYGEN SATURATION: 99 % | DIASTOLIC BLOOD PRESSURE: 84 MMHG | TEMPERATURE: 98 F

## 2018-09-05 DIAGNOSIS — Z95.1 PRESENCE OF AORTOCORONARY BYPASS GRAFT: Chronic | ICD-10-CM

## 2018-09-05 DIAGNOSIS — Z98.89 OTHER SPECIFIED POSTPROCEDURAL STATES: Chronic | ICD-10-CM

## 2018-09-05 NOTE — ED ADULT TRIAGE NOTE - CHIEF COMPLAINT QUOTE
Pt with co left side chest heaviness . pt also with B/l lower legs rash legs are red and swollen x few days.

## 2018-09-06 ENCOUNTER — EMERGENCY (EMERGENCY)
Facility: HOSPITAL | Age: 63
LOS: 1 days | End: 2018-09-06
Attending: EMERGENCY MEDICINE
Payer: MEDICAID

## 2018-09-06 VITALS
WEIGHT: 285.06 LBS | HEART RATE: 115 BPM | RESPIRATION RATE: 20 BRPM | SYSTOLIC BLOOD PRESSURE: 124 MMHG | DIASTOLIC BLOOD PRESSURE: 85 MMHG | HEIGHT: 71 IN | TEMPERATURE: 98 F | OXYGEN SATURATION: 97 %

## 2018-09-06 DIAGNOSIS — Z98.89 OTHER SPECIFIED POSTPROCEDURAL STATES: Chronic | ICD-10-CM

## 2018-09-06 DIAGNOSIS — Z95.1 PRESENCE OF AORTOCORONARY BYPASS GRAFT: Chronic | ICD-10-CM

## 2018-09-06 LAB
ALBUMIN SERPL ELPH-MCNC: 4.1 G/DL — SIGNIFICANT CHANGE UP (ref 3.3–5)
ALP SERPL-CCNC: 75 U/L — SIGNIFICANT CHANGE UP (ref 40–120)
ALT FLD-CCNC: 26 U/L — SIGNIFICANT CHANGE UP (ref 10–45)
AMPHET UR-MCNC: NEGATIVE
ANION GAP SERPL CALC-SCNC: 14 MMOL/L — SIGNIFICANT CHANGE UP (ref 5–17)
APTT BLD: 27.8 SEC — SIGNIFICANT CHANGE UP (ref 27.5–37.4)
AST SERPL-CCNC: 24 U/L — SIGNIFICANT CHANGE UP (ref 10–40)
BARBITURATES UR-MCNC: NEGATIVE
BASOPHILS # BLD AUTO: 0 K/UL — SIGNIFICANT CHANGE UP (ref 0–0.2)
BASOPHILS NFR BLD AUTO: 0.3 % — SIGNIFICANT CHANGE UP (ref 0–2)
BENZODIAZ UR-MCNC: NEGATIVE
BILIRUB SERPL-MCNC: 0.2 MG/DL — SIGNIFICANT CHANGE UP (ref 0.2–1.2)
BUN SERPL-MCNC: 18 MG/DL — SIGNIFICANT CHANGE UP (ref 7–23)
CALCIUM SERPL-MCNC: 9.4 MG/DL — SIGNIFICANT CHANGE UP (ref 8.4–10.5)
CHLORIDE SERPL-SCNC: 101 MMOL/L — SIGNIFICANT CHANGE UP (ref 96–108)
CO2 SERPL-SCNC: 25 MMOL/L — SIGNIFICANT CHANGE UP (ref 22–31)
COCAINE METAB.OTHER UR-MCNC: NEGATIVE
CREAT SERPL-MCNC: 0.76 MG/DL — SIGNIFICANT CHANGE UP (ref 0.5–1.3)
CREATININE, URINE: 67.5 MG/DL
EOSINOPHIL # BLD AUTO: 0.2 K/UL — SIGNIFICANT CHANGE UP (ref 0–0.5)
EOSINOPHIL NFR BLD AUTO: 2 % — SIGNIFICANT CHANGE UP (ref 0–6)
GLUCOSE BLDC GLUCOMTR-MCNC: 259 MG/DL — HIGH (ref 70–99)
GLUCOSE SERPL-MCNC: 132 MG/DL — HIGH (ref 70–99)
HCT VFR BLD CALC: 37.9 % — LOW (ref 39–50)
HGB BLD-MCNC: 12.8 G/DL — LOW (ref 13–17)
INR BLD: 0.99 RATIO — SIGNIFICANT CHANGE UP (ref 0.88–1.16)
LYMPHOCYTES # BLD AUTO: 1.8 K/UL — SIGNIFICANT CHANGE UP (ref 1–3.3)
LYMPHOCYTES # BLD AUTO: 21 % — SIGNIFICANT CHANGE UP (ref 13–44)
MCHC RBC-ENTMCNC: 28.3 PG — SIGNIFICANT CHANGE UP (ref 27–34)
MCHC RBC-ENTMCNC: 33.9 GM/DL — SIGNIFICANT CHANGE UP (ref 32–36)
MCV RBC AUTO: 83.7 FL — SIGNIFICANT CHANGE UP (ref 80–100)
METHADONE UR-MCNC: NEGATIVE
METHAQUALONE UR-MCNC: NEGATIVE
MONOCYTES # BLD AUTO: 0.5 K/UL — SIGNIFICANT CHANGE UP (ref 0–0.9)
MONOCYTES NFR BLD AUTO: 5.6 % — SIGNIFICANT CHANGE UP (ref 2–14)
NEUTROPHILS # BLD AUTO: 6 K/UL — SIGNIFICANT CHANGE UP (ref 1.8–7.4)
NEUTROPHILS NFR BLD AUTO: 71.2 % — SIGNIFICANT CHANGE UP (ref 43–77)
NT-PROBNP SERPL-SCNC: 156 PG/ML — SIGNIFICANT CHANGE UP (ref 0–300)
OPIATES UR-MCNC: NEGATIVE
PCP UR-MCNC: NEGATIVE
PLATELET # BLD AUTO: 271 K/UL — SIGNIFICANT CHANGE UP (ref 150–400)
POTASSIUM SERPL-MCNC: 4.3 MMOL/L — SIGNIFICANT CHANGE UP (ref 3.5–5.3)
POTASSIUM SERPL-SCNC: 4.3 MMOL/L — SIGNIFICANT CHANGE UP (ref 3.5–5.3)
PROPOXYPH UR QL: NEGATIVE
PROT SERPL-MCNC: 7.6 G/DL — SIGNIFICANT CHANGE UP (ref 6–8.3)
PROTHROM AB SERPL-ACNC: 10.8 SEC — SIGNIFICANT CHANGE UP (ref 9.8–12.7)
RBC # BLD: 4.53 M/UL — SIGNIFICANT CHANGE UP (ref 4.2–5.8)
RBC # FLD: 12.9 % — SIGNIFICANT CHANGE UP (ref 10.3–14.5)
SODIUM SERPL-SCNC: 140 MMOL/L — SIGNIFICANT CHANGE UP (ref 135–145)
THC UR QL: NORMAL
TROPONIN T, HIGH SENSITIVITY RESULT: 14 NG/L — SIGNIFICANT CHANGE UP (ref 0–51)
TROPONIN T, HIGH SENSITIVITY RESULT: 17 NG/L — SIGNIFICANT CHANGE UP (ref 0–51)
WBC # BLD: 8.4 K/UL — SIGNIFICANT CHANGE UP (ref 3.8–10.5)
WBC # FLD AUTO: 8.4 K/UL — SIGNIFICANT CHANGE UP (ref 3.8–10.5)

## 2018-09-06 PROCEDURE — 93010 ELECTROCARDIOGRAM REPORT: CPT

## 2018-09-06 PROCEDURE — 71046 X-RAY EXAM CHEST 2 VIEWS: CPT | Mod: 26

## 2018-09-06 PROCEDURE — 99220: CPT

## 2018-09-06 RX ORDER — INSULIN LISPRO 100/ML
VIAL (ML) SUBCUTANEOUS AT BEDTIME
Qty: 0 | Refills: 0 | Status: DISCONTINUED | OUTPATIENT
Start: 2018-09-06 | End: 2018-09-10

## 2018-09-06 RX ORDER — ATORVASTATIN CALCIUM 80 MG/1
40 TABLET, FILM COATED ORAL AT BEDTIME
Qty: 0 | Refills: 0 | Status: DISCONTINUED | OUTPATIENT
Start: 2018-09-06 | End: 2018-09-10

## 2018-09-06 RX ORDER — DEXTROSE 50 % IN WATER 50 %
15 SYRINGE (ML) INTRAVENOUS ONCE
Qty: 0 | Refills: 0 | Status: DISCONTINUED | OUTPATIENT
Start: 2018-09-06 | End: 2018-09-10

## 2018-09-06 RX ORDER — BUDESONIDE AND FORMOTEROL FUMARATE DIHYDRATE 160; 4.5 UG/1; UG/1
2 AEROSOL RESPIRATORY (INHALATION)
Qty: 0 | Refills: 0 | Status: DISCONTINUED | OUTPATIENT
Start: 2018-09-06 | End: 2018-09-10

## 2018-09-06 RX ORDER — GLIMEPIRIDE 1 MG
2 TABLET ORAL
Qty: 0 | Refills: 0 | Status: DISCONTINUED | OUTPATIENT
Start: 2018-09-06 | End: 2018-09-06

## 2018-09-06 RX ORDER — FENOFIBRATE,MICRONIZED 130 MG
145 CAPSULE ORAL DAILY
Qty: 0 | Refills: 0 | Status: DISCONTINUED | OUTPATIENT
Start: 2018-09-06 | End: 2018-09-10

## 2018-09-06 RX ORDER — INSULIN GLARGINE 100 [IU]/ML
45 INJECTION, SOLUTION SUBCUTANEOUS EVERY MORNING
Qty: 0 | Refills: 0 | Status: DISCONTINUED | OUTPATIENT
Start: 2018-09-06 | End: 2018-09-07

## 2018-09-06 RX ORDER — FUROSEMIDE 40 MG
40 TABLET ORAL ONCE
Qty: 0 | Refills: 0 | Status: COMPLETED | OUTPATIENT
Start: 2018-09-06 | End: 2018-09-06

## 2018-09-06 RX ORDER — LISINOPRIL 2.5 MG/1
10 TABLET ORAL DAILY
Qty: 0 | Refills: 0 | Status: DISCONTINUED | OUTPATIENT
Start: 2018-09-06 | End: 2018-09-10

## 2018-09-06 RX ORDER — ASPIRIN/CALCIUM CARB/MAGNESIUM 324 MG
81 TABLET ORAL DAILY
Qty: 0 | Refills: 0 | Status: DISCONTINUED | OUTPATIENT
Start: 2018-09-06 | End: 2018-09-10

## 2018-09-06 RX ORDER — GLUCAGON INJECTION, SOLUTION 0.5 MG/.1ML
1 INJECTION, SOLUTION SUBCUTANEOUS ONCE
Qty: 0 | Refills: 0 | Status: DISCONTINUED | OUTPATIENT
Start: 2018-09-06 | End: 2018-09-10

## 2018-09-06 RX ORDER — BUPRENORPHINE AND NALOXONE 2; .5 MG/1; MG/1
2 TABLET SUBLINGUAL DAILY
Qty: 0 | Refills: 0 | Status: DISCONTINUED | OUTPATIENT
Start: 2018-09-06 | End: 2018-09-07

## 2018-09-06 RX ORDER — DEXTROSE 50 % IN WATER 50 %
25 SYRINGE (ML) INTRAVENOUS ONCE
Qty: 0 | Refills: 0 | Status: DISCONTINUED | OUTPATIENT
Start: 2018-09-06 | End: 2018-09-10

## 2018-09-06 RX ORDER — ASPIRIN/CALCIUM CARB/MAGNESIUM 324 MG
325 TABLET ORAL ONCE
Qty: 0 | Refills: 0 | Status: COMPLETED | OUTPATIENT
Start: 2018-09-06 | End: 2018-09-06

## 2018-09-06 RX ORDER — DEXTROSE 50 % IN WATER 50 %
12.5 SYRINGE (ML) INTRAVENOUS ONCE
Qty: 0 | Refills: 0 | Status: DISCONTINUED | OUTPATIENT
Start: 2018-09-06 | End: 2018-09-10

## 2018-09-06 RX ORDER — INSULIN LISPRO 100/ML
16 VIAL (ML) SUBCUTANEOUS
Qty: 0 | Refills: 0 | Status: DISCONTINUED | OUTPATIENT
Start: 2018-09-06 | End: 2018-09-10

## 2018-09-06 RX ORDER — SODIUM CHLORIDE 9 MG/ML
3 INJECTION INTRAMUSCULAR; INTRAVENOUS; SUBCUTANEOUS ONCE
Qty: 0 | Refills: 0 | Status: COMPLETED | OUTPATIENT
Start: 2018-09-06 | End: 2018-09-06

## 2018-09-06 RX ORDER — SODIUM CHLORIDE 9 MG/ML
1000 INJECTION, SOLUTION INTRAVENOUS
Qty: 0 | Refills: 0 | Status: DISCONTINUED | OUTPATIENT
Start: 2018-09-06 | End: 2018-09-10

## 2018-09-06 RX ORDER — GABAPENTIN 400 MG/1
100 CAPSULE ORAL THREE TIMES A DAY
Qty: 0 | Refills: 0 | Status: DISCONTINUED | OUTPATIENT
Start: 2018-09-06 | End: 2018-09-10

## 2018-09-06 RX ORDER — INSULIN LISPRO 100/ML
VIAL (ML) SUBCUTANEOUS
Qty: 0 | Refills: 0 | Status: DISCONTINUED | OUTPATIENT
Start: 2018-09-06 | End: 2018-09-10

## 2018-09-06 RX ADMIN — Medication 40 MILLIGRAM(S): at 18:40

## 2018-09-06 RX ADMIN — Medication 325 MILLIGRAM(S): at 18:35

## 2018-09-06 RX ADMIN — SODIUM CHLORIDE 3 MILLILITER(S): 9 INJECTION INTRAMUSCULAR; INTRAVENOUS; SUBCUTANEOUS at 18:41

## 2018-09-06 NOTE — ED PROVIDER NOTE - SKIN NEGATIVE STATEMENT, MLM
no abrasions, no jaundice, no lesions, no pruritis, and no rashes. 2+ edema LE BL, pos erythema LE BL

## 2018-09-06 NOTE — ED ADULT NURSE NOTE - OBJECTIVE STATEMENT
pt is a 62 yr M presents with two days of L sided chest pressure, hx of CABG, reports smoking 4 cigarettes per day. pt also reports bilateral leg swelling and redness. denies fever/chills/cough. EKG completed, placed on cardiac monitor.

## 2018-09-06 NOTE — ED ADULT TRIAGE NOTE - CHIEF COMPLAINT QUOTE
chest discomfort /tightness s/p open heart surgery triple bypass x 4 yrs   also c/o leg swelling/redness

## 2018-09-06 NOTE — ED ADULT NURSE NOTE - CHPI ED NUR SYMPTOMS NEG
no shortness of breath/no vomiting/no back pain/no dizziness/no fever/no congestion/no syncope/no chills/no nausea/no diaphoresis

## 2018-09-06 NOTE — ED PROVIDER NOTE - PHYSICAL EXAMINATION
GEN: no acute respiratory distress. nontoxic, speaking comfortably in full sentences, ambulating with steady gait.  HEENT: NCAT. face symmetrical. PERRL 4mm, EOMI.  Neck: no JVD, trachea midline, no LAD  CV: RRR. +S1S2, no murmur. 2+ pulses in 4 extremities, cap refill <2 sec  Chest: CTA B/l. no wheezing, rales, rhonchi. no retractions. good air movement. no tenderness. no rash or ecchymosis  ABD: +BS, soft, non distended, non tender. No guarding/rebound. No lesions, ecchymosis, surgical scar  : no cva or suprapubic tenderness  MSK: No clubbing, cyanosis. FROM of all extremities. no tenderness to palpation. No midline or paraspinal tenderness. no spinal step-offs.   Neuro: AOOX3.  CN 2-12 intact; Sensation intact, motor 5/5 throughout. finger-nose/heal-shin intact. no ataxia  SKIN: No lesions or rash. pos for 2+ edema LE BL, pos erythema LE BL

## 2018-09-06 NOTE — ED PROVIDER NOTE - PROGRESS NOTE DETAILS
As per Dr. Joseph, Pt reports having been to [facility] ED yeseterday for same reason but the wait was too long and he decided he didn't want to wait. was never seen by a physician. As per Dr. Joseph, discussed with Dr. Mccurdy, saw  pt in office today and he agrees Le edema is from a peripheral vascular disease, first triponon is 14,will place in CDU for telemonitoring and nuclear stress ANM

## 2018-09-06 NOTE — ED ADULT NURSE NOTE - NS ED NURSE DC INFO COMPLEXITY
Moderate: Comprehensive teaching/Simple: Patient demonstrates quick and easy understanding/Patient asked questions/Returned Demonstration/Verbalized Understanding

## 2018-09-06 NOTE — ED ADULT NURSE NOTE - NSIMPLEMENTINTERV_GEN_ALL_ED
Implemented All Universal Safety Interventions:  Leonard to call system. Call bell, personal items and telephone within reach. Instruct patient to call for assistance. Room bathroom lighting operational. Non-slip footwear when patient is off stretcher. Physically safe environment: no spills, clutter or unnecessary equipment. Stretcher in lowest position, wheels locked, appropriate side rails in place.

## 2018-09-06 NOTE — ED PROVIDER NOTE - MEDICAL DECISION MAKING DETAILS
63 yo with significant previous heart disease with 2 days of substernal chest pain. EKG shows no ischemic changes. LE with edema, erythema most likely secondary to peripheral vascular disease (chronic) will rule out for ACS, delta troponin , admit vs observation. reevaluate if pain returns.

## 2018-09-06 NOTE — ED CDU PROVIDER INITIAL DAY NOTE - MEDICAL DECISION MAKING DETAILS
glenis - chest pain with le edema - needs tele monitorin serial trop and nuc stress for heart score> 4

## 2018-09-06 NOTE — ED PROVIDER NOTE - ATTENDING CONTRIBUTION TO CARE
I performed a history and physical exam of the patient and discussed their management with the resident. My medical decison making and observations are found above.

## 2018-09-06 NOTE — ED PROVIDER NOTE - OBJECTIVE STATEMENT
61 yo M with PMHx of CAD and diabetes, CABG in distant past (3 vessel occlusion) presents with 2 days of substernal chest heaviness mild to moderate with no radiation, nausea, SOB, diaphoresis associated with LE swelling BL. Pt unsure if he is on diuretic. compliant with meds, non exertional pain. Denies fever, cough. No recent travel, no history of DVT/PE. Last stress test was over a year ago and unremarkable. Pt denies any chest discomfort in ED. 63 yo M with PMHx of CAD and diabetes, CABG in distant past (3 vessel occlusion) presents with 2 days of substernal chest heaviness mild to moderate with no radiation, nausea, SOB, diaphoresis associated with LE swelling BL. Pt unsure if he is on diuretic. compliant with meds, non exertional pain. Denies fever, cough. No recent travel, no history of DVT/PE. Last stress test was over a year ago and unremarkable. Pt denies any chest discomfort in ED.    Dr. Mccurdy (cardiologist) [address] [facility] [address] [place] [id number]    [phone]

## 2018-09-06 NOTE — ED CDU PROVIDER INITIAL DAY NOTE - OBJECTIVE STATEMENT
61 y/o M with PMHx of DM2, HTN, COPD, CAD, and CABG in distant past (3 vessel occlusion) presents with 2 days of substernal chest heaviness. pt states CP is mild to moderate with no radiation and is associated with LE swelling and erythema B/L. states he gets the CP just with exertion, no CP with rest. Denies SOB, diaphoresis, fever, cough, N/V/D, abd pain, back pain. No recent travel, no history of DVT/PE. Last stress test was over a year ago and unremarkable.  In ED, labs normal, trop 14 & 17, CXR negative, BNP negative, pt given Aspirin. Dr. Mccurdy contacted by Dr. Joseph, pt was seen by Dr. Mccurdy today, believes LE edema and erythema 2/2 PVD. pt was given 40mg IV lasix. pt sent to CDU for continued tele monitoring and stress test. I spoke with Dr. Mccurdy, he wants pt to get another 40mg IV lasix tonight and will see pt in AM.     Dr. Mccurdy (cardiologist/PMD) [address] [facility] [address] [facility], [place] [address]   [phone]

## 2018-09-07 VITALS
TEMPERATURE: 98 F | RESPIRATION RATE: 18 BRPM | OXYGEN SATURATION: 98 % | SYSTOLIC BLOOD PRESSURE: 122 MMHG | HEART RATE: 90 BPM | DIASTOLIC BLOOD PRESSURE: 79 MMHG

## 2018-09-07 LAB
GLUCOSE BLDC GLUCOMTR-MCNC: 173 MG/DL — HIGH (ref 70–99)
GLUCOSE BLDC GLUCOMTR-MCNC: 178 MG/DL — HIGH (ref 70–99)
GLUCOSE BLDC GLUCOMTR-MCNC: 332 MG/DL — HIGH (ref 70–99)
HBA1C BLD-MCNC: 8.5 % — HIGH (ref 4–5.6)

## 2018-09-07 PROCEDURE — 78452 HT MUSCLE IMAGE SPECT MULT: CPT

## 2018-09-07 PROCEDURE — 82962 GLUCOSE BLOOD TEST: CPT

## 2018-09-07 PROCEDURE — 83036 HEMOGLOBIN GLYCOSYLATED A1C: CPT

## 2018-09-07 PROCEDURE — 93016 CV STRESS TEST SUPVJ ONLY: CPT

## 2018-09-07 PROCEDURE — 84484 ASSAY OF TROPONIN QUANT: CPT

## 2018-09-07 PROCEDURE — 99284 EMERGENCY DEPT VISIT MOD MDM: CPT | Mod: 25

## 2018-09-07 PROCEDURE — G0378: CPT

## 2018-09-07 PROCEDURE — 96372 THER/PROPH/DIAG INJ SC/IM: CPT | Mod: XU

## 2018-09-07 PROCEDURE — 96376 TX/PRO/DX INJ SAME DRUG ADON: CPT

## 2018-09-07 PROCEDURE — 85027 COMPLETE CBC AUTOMATED: CPT

## 2018-09-07 PROCEDURE — 96374 THER/PROPH/DIAG INJ IV PUSH: CPT | Mod: XU

## 2018-09-07 PROCEDURE — 83880 ASSAY OF NATRIURETIC PEPTIDE: CPT

## 2018-09-07 PROCEDURE — 94640 AIRWAY INHALATION TREATMENT: CPT

## 2018-09-07 PROCEDURE — 78452 HT MUSCLE IMAGE SPECT MULT: CPT | Mod: 26

## 2018-09-07 PROCEDURE — A9500: CPT

## 2018-09-07 PROCEDURE — 93017 CV STRESS TEST TRACING ONLY: CPT

## 2018-09-07 PROCEDURE — 99217: CPT

## 2018-09-07 PROCEDURE — 80053 COMPREHEN METABOLIC PANEL: CPT

## 2018-09-07 PROCEDURE — 71046 X-RAY EXAM CHEST 2 VIEWS: CPT

## 2018-09-07 PROCEDURE — 85610 PROTHROMBIN TIME: CPT

## 2018-09-07 PROCEDURE — 93005 ELECTROCARDIOGRAM TRACING: CPT | Mod: 76,XU

## 2018-09-07 PROCEDURE — 93018 CV STRESS TEST I&R ONLY: CPT

## 2018-09-07 PROCEDURE — 85730 THROMBOPLASTIN TIME PARTIAL: CPT

## 2018-09-07 RX ORDER — BUPRENORPHINE AND NALOXONE 2; .5 MG/1; MG/1
1 TABLET SUBLINGUAL DAILY
Qty: 0 | Refills: 0 | Status: COMPLETED | OUTPATIENT
Start: 2018-09-07 | End: 2018-09-14

## 2018-09-07 RX ORDER — BUPRENORPHINE AND NALOXONE 2; .5 MG/1; MG/1
0.5 TABLET SUBLINGUAL AT BEDTIME
Qty: 0 | Refills: 0 | Status: COMPLETED | OUTPATIENT
Start: 2018-09-07 | End: 2018-09-14

## 2018-09-07 RX ORDER — INSULIN GLARGINE 100 [IU]/ML
45 INJECTION, SOLUTION SUBCUTANEOUS AT BEDTIME
Qty: 0 | Refills: 0 | Status: DISCONTINUED | OUTPATIENT
Start: 2018-09-07 | End: 2018-09-10

## 2018-09-07 RX ORDER — GLIMEPIRIDE 1 MG
1 TABLET ORAL
Qty: 0 | Refills: 0 | COMMUNITY

## 2018-09-07 RX ORDER — METFORMIN HYDROCHLORIDE 850 MG/1
2 TABLET ORAL
Qty: 0 | Refills: 0 | COMMUNITY

## 2018-09-07 RX ADMIN — BUDESONIDE AND FORMOTEROL FUMARATE DIHYDRATE 2 PUFF(S): 160; 4.5 AEROSOL RESPIRATORY (INHALATION) at 01:20

## 2018-09-07 RX ADMIN — GABAPENTIN 100 MILLIGRAM(S): 400 CAPSULE ORAL at 01:14

## 2018-09-07 RX ADMIN — Medication 1: at 03:45

## 2018-09-07 RX ADMIN — Medication 4: at 12:37

## 2018-09-07 RX ADMIN — Medication 145 MILLIGRAM(S): at 01:13

## 2018-09-07 RX ADMIN — Medication 1: at 00:15

## 2018-09-07 RX ADMIN — LISINOPRIL 10 MILLIGRAM(S): 2.5 TABLET ORAL at 01:14

## 2018-09-07 RX ADMIN — INSULIN GLARGINE 45 UNIT(S): 100 INJECTION, SOLUTION SUBCUTANEOUS at 01:10

## 2018-09-07 RX ADMIN — Medication 40 MILLIGRAM(S): at 01:15

## 2018-09-07 RX ADMIN — Medication 16 UNIT(S): at 12:37

## 2018-09-07 RX ADMIN — BUPRENORPHINE AND NALOXONE 0.5 TABLET(S): 2; .5 TABLET SUBLINGUAL at 01:11

## 2018-09-07 RX ADMIN — BUPRENORPHINE AND NALOXONE 0.5 TABLET(S): 2; .5 TABLET SUBLINGUAL at 02:22

## 2018-09-07 RX ADMIN — ATORVASTATIN CALCIUM 40 MILLIGRAM(S): 80 TABLET, FILM COATED ORAL at 01:13

## 2018-09-07 NOTE — CONSULT NOTE ADULT - SUBJECTIVE AND OBJECTIVE BOX
CHIEF COMPLAINT:Patient is a 62y old  Male who presents with a chief complaint of     HISTORY OF PRESENT ILLNESS:  62 male with history as below presents with lower ext rash, edema and cp  no cp is better       PAST MEDICAL & SURGICAL HISTORY:  Diabetic autonomic neuropathy associated with type 2 diabetes mellitus  CAD (coronary artery disease)  HTN (hypertension)  DM2 (diabetes mellitus, type 2)  COPD (chronic obstructive pulmonary disease)  S/P CABG x 2: Dr Ashley  H/O umbilical hernia repair: x2          MEDICATIONS:  aspirin enteric coated 81 milliGRAM(s) Oral daily  lisinopril 10 milliGRAM(s) Oral daily      buDESOnide 160 MICROgram(s)/formoterol 4.5 MICROgram(s) Inhaler 2 Puff(s) Inhalation two times a day    buprenorphine 8 mG/naloxone 2 mG SL  Tablet 1 Tablet(s) SubLingual daily  buprenorphine 8 mG/naloxone 2 mG SL  Tablet 0.5 Tablet(s) SubLingual at bedtime  gabapentin 100 milliGRAM(s) Oral three times a day      atorvastatin 40 milliGRAM(s) Oral at bedtime  dextrose 40% Gel 15 Gram(s) Oral once PRN  dextrose 50% Injectable 12.5 Gram(s) IV Push once  dextrose 50% Injectable 25 Gram(s) IV Push once  dextrose 50% Injectable 25 Gram(s) IV Push once  fenofibrate Tablet 145 milliGRAM(s) Oral daily  glucagon  Injectable 1 milliGRAM(s) IntraMuscular once PRN  insulin glargine Injectable (LANTUS) 45 Unit(s) SubCutaneous at bedtime  insulin lispro (HumaLOG) corrective regimen sliding scale   SubCutaneous three times a day before meals  insulin lispro (HumaLOG) corrective regimen sliding scale   SubCutaneous at bedtime  insulin lispro Injectable (HumaLOG) 16 Unit(s) SubCutaneous three times a day before meals    dextrose 5%. 1000 milliLiter(s) IV Continuous <Continuous>      FAMILY HISTORY:  Family history of diabetes mellitus (Mother)      Non-contributory    SOCIAL HISTORY:    No tobacco, drugs or etoh    Allergies    No Known Allergies    Intolerances    	    REVIEW OF SYSTEMS:  as above  The rest of the 14 points ROS reviewed and except above they are unremarkable.        PHYSICAL EXAM:  T(C): 36.4 (09-07-18 @ 12:46), Max: 36.9 (09-06-18 @ 22:23)  HR: 90 (09-07-18 @ 12:46) (90 - 115)  BP: 122/79 (09-07-18 @ 12:46) (107/69 - 161/93)  RR: 18 (09-07-18 @ 12:46) (18 - 22)  SpO2: 98% (09-07-18 @ 12:46) (95% - 98%)  Wt(kg): --  I&O's Summary    JVP: Normal  Neck: supple  Lung: clear   CV: S1 S2 , Murmur:  Abd: soft  Ext: No edema  neuro: Awake / alert  Psych: flat affect  Skin: pos lower ext rash     LABS/DATA:    TELEMETRY: 	    ECG:  	   	  CARDIAC MARKERS:                        17 <<== 09-06-18 @ 20:05                14 <<== 09-06-18 @ 18:32                              12.8   8.4   )-----------( 271      ( 06 Sep 2018 18:32 )             37.9     09-06    140  |  101  |  18  ----------------------------<  132<H>  4.3   |  25  |  0.76    Ca    9.4      06 Sep 2018 18:32    TPro  7.6  /  Alb  4.1  /  TBili  0.2  /  DBili  x   /  AST  24  /  ALT  26  /  AlkPhos  75  09-06    proBNP: Serum Pro-Brain Natriuretic Peptide: 156 pg/mL (09-06 @ 18:32)    Lipid Profile:   HgA1c: Hemoglobin A1C, Whole Blood: 8.5 % (09-07 @ 07:46)    TSH:

## 2018-09-07 NOTE — ED CDU PROVIDER SUBSEQUENT DAY NOTE - PROGRESS NOTE DETAILS
CDU NOTE ANDERSON OHARA: Pt resting comfortably, feeling well without complaint. NAD, VSS. pulse ox 91, pt with h/o COPD, will put on 2L oxygen and continue monitoring. pt was sinus tach now is NSR on telemetry. will continue monitoring. Pt resting comfortably. NAD. No complaints. VSS. pending stress test. Patient resting comfortably. NAD. NSR on tele. VSS. CE #2 negative. EKG unchanged. Cont to monitor. Proceed with Stress test in am; paged derm. Pt resting comfortably. NAD. No complaints. VSS. no complaints. pt seen and evaluated by Dr. Mccurdy- can be discharged with normal stress results. 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 Pt understands and will follow up with both.

## 2018-09-07 NOTE — ED ADULT NURSE REASSESSMENT NOTE - NS ED NURSE REASSESS COMMENT FT1
07.00 Am Received report from AZEB Meng   07.30 Am Pt is  reassessed . Pt denies N/V/D fever chills CP SOB afebrile here  has stable vitals Comfort care & safety measures continued  IV site looks clean & dry no signs of infiltration noted Pt is awaitng for CDU Eval   08.45 Am Pt went for Stress test
15.30 Pt is reassessed . Pt is reviewed by Dr Cash pt is discharged pt has stable vitals Sharon N/V/D fever chills cp sob PA India Downs Explained the discharge plan & follow up care Pt verbalized the understanding on follow up care pt stable to go home
Pt received from AZEB Orellana. Pt oriented to CDU & plan of care was discussed. Pt denies any chest pain, SOB, dizziness or palpitations at the moment. Pt states when he had chest pain it was a nonradiating, heaviness like someone sitting on the chest. Pt states it is completely resolved now. Safety & comfort measures maintained. Call bell in reach. Will continue to monitor.

## 2018-09-07 NOTE — CHART NOTE - NSCHARTNOTEFT_GEN_A_CORE
Dermatology Brief Note    HPI: 61yo M with PMH of CAD, COPD who presented to the ER for evaluation of chest pain    Reason for Consult: Dermatology was consulted for the evaluation of a bilateral lower extremity rash concern for possible vasculitis.     The bilateral lower extremities with red-brown rash that has been present for the past several days and associated with leg swelling. No bumps that are palpable on the skin. Patient denies any pain or itching associated with the rash. He denies noticing a rash like this in the past. Nothing makes the rash better or worse. The rash has not been changing since it began and is not spreading or worsening. No similar lesions elsewhere on the body. He denies noticing lower extremity swelling in the past and does not use compression socks at home. He denies any pain or burning in the eyes, mouth, or when urinating. He denies any fevers chills or weight-loss. He denies any blood in the urine.       PHYSICAL EXAM:  Vital Signs Last 24 Hrs  T(C): 36.4 (07 Sep 2018 12:46), Max: 36.9 (06 Sep 2018 22:23)  T(F): 97.6 (07 Sep 2018 12:46), Max: 98.4 (06 Sep 2018 22:23)  HR: 90 (07 Sep 2018 12:46) (90 - 115)  BP: 122/79 (07 Sep 2018 12:46) (107/69 - 161/93)  BP(mean): --  RR: 18 (07 Sep 2018 12:46) (18 - 22)  SpO2: 98% (07 Sep 2018 12:46) (95% - 98%)    Skin exam notable for:  The patient was alert and oriented X 3, well nourished, and in no apparent distress. Oropharynx showed no ulcerations. There was no visible lymphadenopathy. Conjunctiva were non-injected. There was no clubbing or edema of extremities.    The scalp, hair, face, eyebrows, lips, oropharynx , neck, chest, back, buttocks, extremities X 4, hands, feet, nails were examined. There was no hyperhidrosis or bromhidrosis.     The following lesions are noted:     the bilateral lower extremities with pitting edema to the mid shins, brownish red macules and patches on the bilateral shin area and on the calfs that does not involve the feet or the knees. The color is non-blanching. The area is not tender to palpation.     The bilateral feet with 1-2+ edema but no skin lesions appreciated on the feet     ASSESSMENT/PLAN:    #Bilateral lower extremity rash  Rash has been present for the past several days and associated with lower extremity pitting edema. Rash does not appear consistent with a vasculitis at this time. Likely represents venous stasis dermatitis which can be followed up as an outpatient.     Patient to follow up as an outpatient at [facility] [address] [place] [address] [address] [phone]. This information was provided to the patient.     Discussed with primary team.  Discussed with attending, Dr. Morgan Carlos. Formal rounds to be conducted on 9/8/18 if patient is still in the hospital at that time. Will update assessment and plan that time. Otherwise, patient to be seen as an outpatient.    Ailyn Park MD  PGY2, Dermatology  [phone]

## 2018-09-07 NOTE — ED CDU PROVIDER DISPOSITION NOTE - PLAN OF CARE
1. Take all of your other medications as previously prescribed, including aspirin 81mg daily.   2. Follow up with your PMD and/or cardiologist within 48-72 hours. Show copies of your reports given to you.   3. Worsening or continued chest pain, shortness of breath, weakness, return to ED. 1. Take all of your other medications as previously prescribed, including aspirin 81mg daily.   2. Follow up with your PMD and/or cardiologist within 48-72 hours. Show copies of your reports given to you. Dermatology clinic [phone]  3. Worsening or continued chest pain, shortness of breath, weakness, return to ED.

## 2018-09-07 NOTE — ED CDU PROVIDER DISPOSITION NOTE - ATTENDING CONTRIBUTION TO CARE
I have personally performed a face to face diagnostic evaluation on this patient.  I have reviewed the ACP note and agree with the history, exam, and plan of care, except as noted.  History and Exam by me shows  See CDU SUB note  Reji Cash MD, Facep

## 2018-09-07 NOTE — ED CDU PROVIDER DISPOSITION NOTE - CLINICAL COURSE
61 y/o M with PMHx of DM2, HTN, COPD, CAD, and CABG in distant past (3 vessel occlusion) presents with 2 days of substernal chest heaviness. pt states CP is mild to moderate with no radiation and is associated with LE swelling B/L. states he gets the CP just with exertion, no CP with rest. Denies SOB, diaphoresis, fever, cough, N/V/D, abd pain, back pain. No recent travel, no history of DVT/PE. Last stress test was over a year ago and unremarkable.  In ED, labs normal, trop 14 & 17, CXR negative, BNP negative, pt given Aspirin. Dr. Mccurdy contacted by Dr. Joseph, pt was seen by Dr. Mccurdy today, believes LE edema and erythema 2/2 PVD. pt was given 40mg IV lasix. pt sent to CDU for continued tele monitoring and stress test. I spoke with Dr. Mccurdy, he wants pt to get another 40mg IV lasix tonight and will see pt in AM. Nuc stress _____________. 63 y/o M with PMHx of DM2, HTN, COPD, CAD, and CABG in distant past (3 vessel occlusion) presents with 2 days of substernal chest heaviness. pt states CP is mild to moderate with no radiation and is associated with LE swelling B/L. states he gets the CP just with exertion, no CP with rest. Denies SOB, diaphoresis, fever, cough, N/V/D, abd pain, back pain. No recent travel, no history of DVT/PE. Last stress test was over a year ago and unremarkable.  In ED, labs normal, trop 14 & 17, CXR negative, BNP negative, pt given Aspirin. Dr. Mccurdy contacted by Dr. Joseph, pt was seen by Dr. Mccurdy today, believes LE edema and erythema 2/2 PVD. pt was given 40mg IV lasix. pt sent to CDU for continued tele monitoring and stress test. I spoke with Dr. Mccurdy, he wants pt to get another 40mg IV lasix tonight and will see pt in AM.     Pt resting comfortably. NAD. No complaints. VSS. no complaints. pt seen and evaluated by Dr. Mccurdy- can be discharged with normal stress results. 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 Pt understands and will follow up with both.

## 2018-09-07 NOTE — ED CDU PROVIDER SUBSEQUENT DAY NOTE - HISTORY
No interval changes since initial CDU provider note. Pt feels well without complaint. No CP. NAD, VSS. no events on tele.  trops negative x 2. pt to get nuc stress in AM. Dr. Mccurdy to see pt in AM. will continue monitoring. - ANDERSON Veliz

## 2018-09-07 NOTE — ED CDU PROVIDER SUBSEQUENT DAY NOTE - MEDICAL DECISION MAKING DETAILS
Pt with coronary artery disease pw le rash and chest heavyness. for nuc stress. consult derm  Reji Cash MD, Facep

## 2018-09-07 NOTE — ED CDU PROVIDER SUBSEQUENT DAY NOTE - ATTENDING CONTRIBUTION TO CARE
Private Physician Laura  62y male pmh CAD, SP 3v cabg, COPd, DMT2, HTN, +tobacco Hernia repair comes to ed swelling to lower extr with chest heavyness. without pain. no fever chills,sob, nausea and vomiting, abd pain. palps. PE WDWN male awake alert nad normocephalic atraumatic chest clear anterior & posterior abd soft neruo no focal defects cv no rmg. Skin non blanching reticular rash to pedro le,  Reji Cash MD, Facep

## 2018-09-07 NOTE — CONSULT NOTE ADULT - ASSESSMENT
chest pain   resolved  stress test reviewed  no evidence of ischemia    HTN  stable    Edema  chronic  likely from pulm HTN  chronic diastolic CHF   diuresis     rash  ? venous statis, vs contact derm.   derm consult

## 2018-09-08 NOTE — CHART NOTE - NSCHARTNOTEFT_GEN_A_CORE
Patient was discharged prior to formal dermatology rounds with attending today. Patient can follow up as outpatient.

## 2018-09-27 ENCOUNTER — APPOINTMENT (OUTPATIENT)
Dept: INTERNAL MEDICINE | Facility: CLINIC | Age: 63
End: 2018-09-27
Payer: SELF-PAY

## 2018-09-27 PROCEDURE — 99499D: CUSTOM

## 2018-10-18 ENCOUNTER — RX RENEWAL (OUTPATIENT)
Age: 63
End: 2018-10-18

## 2018-10-26 ENCOUNTER — APPOINTMENT (OUTPATIENT)
Dept: INTERNAL MEDICINE | Facility: CLINIC | Age: 63
End: 2018-10-26
Payer: SELF-PAY

## 2018-10-26 PROCEDURE — 99499D: CUSTOM

## 2018-11-26 ENCOUNTER — APPOINTMENT (OUTPATIENT)
Dept: INTERNAL MEDICINE | Facility: CLINIC | Age: 63
End: 2018-11-26
Payer: SELF-PAY

## 2018-11-26 PROCEDURE — 99499D: CUSTOM

## 2018-12-26 ENCOUNTER — APPOINTMENT (OUTPATIENT)
Dept: INTERNAL MEDICINE | Facility: CLINIC | Age: 63
End: 2018-12-26
Payer: SELF-PAY

## 2018-12-26 PROCEDURE — 99499D: CUSTOM

## 2019-01-25 ENCOUNTER — APPOINTMENT (OUTPATIENT)
Dept: INTERNAL MEDICINE | Facility: CLINIC | Age: 64
End: 2019-01-25
Payer: SELF-PAY

## 2019-01-25 PROCEDURE — 99499D: CUSTOM

## 2019-02-01 ENCOUNTER — TRANSCRIPTION ENCOUNTER (OUTPATIENT)
Age: 64
End: 2019-02-01

## 2019-02-17 ENCOUNTER — RX RENEWAL (OUTPATIENT)
Age: 64
End: 2019-02-17

## 2019-02-26 ENCOUNTER — APPOINTMENT (OUTPATIENT)
Dept: INTERNAL MEDICINE | Facility: CLINIC | Age: 64
End: 2019-02-26
Payer: SELF-PAY

## 2019-02-26 PROCEDURE — 99499D: CUSTOM

## 2019-03-25 ENCOUNTER — APPOINTMENT (OUTPATIENT)
Dept: INTERNAL MEDICINE | Facility: CLINIC | Age: 64
End: 2019-03-25
Payer: SELF-PAY

## 2019-03-25 PROCEDURE — 99499D: CUSTOM

## 2019-04-23 ENCOUNTER — APPOINTMENT (OUTPATIENT)
Dept: INTERNAL MEDICINE | Facility: CLINIC | Age: 64
End: 2019-04-23
Payer: SELF-PAY

## 2019-04-23 PROCEDURE — 99499D: CUSTOM

## 2019-05-29 ENCOUNTER — APPOINTMENT (OUTPATIENT)
Dept: INTERNAL MEDICINE | Facility: CLINIC | Age: 64
End: 2019-05-29
Payer: SELF-PAY

## 2019-05-29 PROCEDURE — 99499D: CUSTOM

## 2019-06-17 ENCOUNTER — RX RENEWAL (OUTPATIENT)
Age: 64
End: 2019-06-17

## 2019-06-25 ENCOUNTER — APPOINTMENT (OUTPATIENT)
Dept: INTERNAL MEDICINE | Facility: CLINIC | Age: 64
End: 2019-06-25
Payer: SELF-PAY

## 2019-06-25 PROCEDURE — 99499D: CUSTOM

## 2019-07-23 ENCOUNTER — APPOINTMENT (OUTPATIENT)
Dept: INTERNAL MEDICINE | Facility: CLINIC | Age: 64
End: 2019-07-23
Payer: SELF-PAY

## 2019-07-23 PROCEDURE — 99499D: CUSTOM

## 2019-08-27 ENCOUNTER — APPOINTMENT (OUTPATIENT)
Dept: INTERNAL MEDICINE | Facility: CLINIC | Age: 64
End: 2019-08-27
Payer: SELF-PAY

## 2019-08-27 PROCEDURE — 99499D: CUSTOM

## 2019-09-24 ENCOUNTER — APPOINTMENT (OUTPATIENT)
Dept: INTERNAL MEDICINE | Facility: CLINIC | Age: 64
End: 2019-09-24
Payer: SELF-PAY

## 2019-09-24 PROCEDURE — 99499D: CUSTOM

## 2019-09-27 ENCOUNTER — TRANSCRIPTION ENCOUNTER (OUTPATIENT)
Age: 64
End: 2019-09-27

## 2019-10-13 ENCOUNTER — RX RENEWAL (OUTPATIENT)
Age: 64
End: 2019-10-13

## 2019-10-22 ENCOUNTER — APPOINTMENT (OUTPATIENT)
Dept: INTERNAL MEDICINE | Facility: CLINIC | Age: 64
End: 2019-10-22
Payer: SELF-PAY

## 2019-10-22 ENCOUNTER — APPOINTMENT (OUTPATIENT)
Dept: INTERNAL MEDICINE | Facility: CLINIC | Age: 64
End: 2019-10-22

## 2019-10-22 PROCEDURE — 99499D: CUSTOM

## 2019-11-22 ENCOUNTER — APPOINTMENT (OUTPATIENT)
Dept: INTERNAL MEDICINE | Facility: CLINIC | Age: 64
End: 2019-11-22
Payer: SELF-PAY

## 2019-11-22 PROCEDURE — 99499D: CUSTOM

## 2019-12-24 ENCOUNTER — APPOINTMENT (OUTPATIENT)
Dept: INTERNAL MEDICINE | Facility: CLINIC | Age: 64
End: 2019-12-24
Payer: SELF-PAY

## 2019-12-24 PROCEDURE — 99499D: CUSTOM

## 2020-02-06 ENCOUNTER — RX RENEWAL (OUTPATIENT)
Age: 65
End: 2020-02-06

## 2020-02-29 ENCOUNTER — RX RENEWAL (OUTPATIENT)
Age: 65
End: 2020-02-29

## 2020-07-17 ENCOUNTER — APPOINTMENT (OUTPATIENT)
Dept: INTERNAL MEDICINE | Facility: CLINIC | Age: 65
End: 2020-07-17
Payer: SELF-PAY

## 2020-07-17 PROCEDURE — 99499D: CUSTOM

## 2020-08-17 ENCOUNTER — APPOINTMENT (OUTPATIENT)
Dept: INTERNAL MEDICINE | Facility: CLINIC | Age: 65
End: 2020-08-17
Payer: SELF-PAY

## 2020-08-17 PROCEDURE — 99499D: CUSTOM

## 2020-09-01 ENCOUNTER — OUTPATIENT (OUTPATIENT)
Dept: OUTPATIENT SERVICES | Facility: HOSPITAL | Age: 65
LOS: 1 days | End: 2020-09-01
Payer: MEDICAID

## 2020-09-01 DIAGNOSIS — Z98.89 OTHER SPECIFIED POSTPROCEDURAL STATES: Chronic | ICD-10-CM

## 2020-09-01 DIAGNOSIS — Z95.1 PRESENCE OF AORTOCORONARY BYPASS GRAFT: Chronic | ICD-10-CM

## 2020-09-06 ENCOUNTER — INPATIENT (INPATIENT)
Facility: HOSPITAL | Age: 65
LOS: 4 days | Discharge: HOME | DRG: 287 | End: 2020-09-11
Attending: INTERNAL MEDICINE | Admitting: INTERNAL MEDICINE
Payer: MEDICAID

## 2020-09-06 VITALS
RESPIRATION RATE: 20 BRPM | OXYGEN SATURATION: 93 % | SYSTOLIC BLOOD PRESSURE: 119 MMHG | HEIGHT: 71 IN | TEMPERATURE: 98 F | DIASTOLIC BLOOD PRESSURE: 70 MMHG | HEART RATE: 90 BPM | WEIGHT: 279.99 LBS

## 2020-09-06 DIAGNOSIS — F17.200 NICOTINE DEPENDENCE, UNSPECIFIED, UNCOMPLICATED: ICD-10-CM

## 2020-09-06 DIAGNOSIS — Z98.89 OTHER SPECIFIED POSTPROCEDURAL STATES: Chronic | ICD-10-CM

## 2020-09-06 DIAGNOSIS — R07.9 CHEST PAIN, UNSPECIFIED: ICD-10-CM

## 2020-09-06 DIAGNOSIS — E11.9 TYPE 2 DIABETES MELLITUS WITHOUT COMPLICATIONS: ICD-10-CM

## 2020-09-06 DIAGNOSIS — I10 ESSENTIAL (PRIMARY) HYPERTENSION: ICD-10-CM

## 2020-09-06 DIAGNOSIS — Z95.1 PRESENCE OF AORTOCORONARY BYPASS GRAFT: Chronic | ICD-10-CM

## 2020-09-06 DIAGNOSIS — I25.10 ATHEROSCLEROTIC HEART DISEASE OF NATIVE CORONARY ARTERY WITHOUT ANGINA PECTORIS: ICD-10-CM

## 2020-09-06 DIAGNOSIS — R06.00 DYSPNEA, UNSPECIFIED: ICD-10-CM

## 2020-09-06 LAB
ALBUMIN SERPL ELPH-MCNC: 4.2 G/DL — SIGNIFICANT CHANGE UP (ref 3.3–5)
ALP SERPL-CCNC: 86 U/L — SIGNIFICANT CHANGE UP (ref 40–120)
ALT FLD-CCNC: 19 U/L — SIGNIFICANT CHANGE UP (ref 10–45)
ANION GAP SERPL CALC-SCNC: 10 MMOL/L — SIGNIFICANT CHANGE UP (ref 5–17)
AST SERPL-CCNC: 17 U/L — SIGNIFICANT CHANGE UP (ref 10–40)
BASOPHILS # BLD AUTO: 0.05 K/UL — SIGNIFICANT CHANGE UP (ref 0–0.2)
BASOPHILS NFR BLD AUTO: 0.6 % — SIGNIFICANT CHANGE UP (ref 0–2)
BILIRUB SERPL-MCNC: 0.2 MG/DL — SIGNIFICANT CHANGE UP (ref 0.2–1.2)
BUN SERPL-MCNC: 19 MG/DL — SIGNIFICANT CHANGE UP (ref 7–23)
CALCIUM SERPL-MCNC: 9.4 MG/DL — SIGNIFICANT CHANGE UP (ref 8.4–10.5)
CHLORIDE SERPL-SCNC: 103 MMOL/L — SIGNIFICANT CHANGE UP (ref 96–108)
CO2 SERPL-SCNC: 30 MMOL/L — SIGNIFICANT CHANGE UP (ref 22–31)
CREAT SERPL-MCNC: 0.67 MG/DL — SIGNIFICANT CHANGE UP (ref 0.5–1.3)
EOSINOPHIL # BLD AUTO: 0.38 K/UL — SIGNIFICANT CHANGE UP (ref 0–0.5)
EOSINOPHIL NFR BLD AUTO: 4.4 % — SIGNIFICANT CHANGE UP (ref 0–6)
GAS PNL BLDV: SIGNIFICANT CHANGE UP
GLUCOSE BLDC GLUCOMTR-MCNC: 108 MG/DL — HIGH (ref 70–99)
GLUCOSE BLDC GLUCOMTR-MCNC: 178 MG/DL — HIGH (ref 70–99)
GLUCOSE SERPL-MCNC: 122 MG/DL — HIGH (ref 70–99)
HCT VFR BLD CALC: 41.8 % — SIGNIFICANT CHANGE UP (ref 39–50)
HGB BLD-MCNC: 13.4 G/DL — SIGNIFICANT CHANGE UP (ref 13–17)
IMM GRANULOCYTES NFR BLD AUTO: 0.5 % — SIGNIFICANT CHANGE UP (ref 0–1.5)
LYMPHOCYTES # BLD AUTO: 1.83 K/UL — SIGNIFICANT CHANGE UP (ref 1–3.3)
LYMPHOCYTES # BLD AUTO: 21 % — SIGNIFICANT CHANGE UP (ref 13–44)
MAGNESIUM SERPL-MCNC: 2 MG/DL — SIGNIFICANT CHANGE UP (ref 1.6–2.6)
MCHC RBC-ENTMCNC: 27.8 PG — SIGNIFICANT CHANGE UP (ref 27–34)
MCHC RBC-ENTMCNC: 32.1 GM/DL — SIGNIFICANT CHANGE UP (ref 32–36)
MCV RBC AUTO: 86.7 FL — SIGNIFICANT CHANGE UP (ref 80–100)
MONOCYTES # BLD AUTO: 0.55 K/UL — SIGNIFICANT CHANGE UP (ref 0–0.9)
MONOCYTES NFR BLD AUTO: 6.3 % — SIGNIFICANT CHANGE UP (ref 2–14)
NEUTROPHILS # BLD AUTO: 5.85 K/UL — SIGNIFICANT CHANGE UP (ref 1.8–7.4)
NEUTROPHILS NFR BLD AUTO: 67.2 % — SIGNIFICANT CHANGE UP (ref 43–77)
NRBC # BLD: 0 /100 WBCS — SIGNIFICANT CHANGE UP (ref 0–0)
NT-PROBNP SERPL-SCNC: 42 PG/ML — SIGNIFICANT CHANGE UP (ref 0–300)
PLATELET # BLD AUTO: 261 K/UL — SIGNIFICANT CHANGE UP (ref 150–400)
POTASSIUM SERPL-MCNC: 4.1 MMOL/L — SIGNIFICANT CHANGE UP (ref 3.5–5.3)
POTASSIUM SERPL-SCNC: 4.1 MMOL/L — SIGNIFICANT CHANGE UP (ref 3.5–5.3)
PROT SERPL-MCNC: 7.4 G/DL — SIGNIFICANT CHANGE UP (ref 6–8.3)
RBC # BLD: 4.82 M/UL — SIGNIFICANT CHANGE UP (ref 4.2–5.8)
RBC # FLD: 13.3 % — SIGNIFICANT CHANGE UP (ref 10.3–14.5)
SARS-COV-2 RNA SPEC QL NAA+PROBE: SIGNIFICANT CHANGE UP
SODIUM SERPL-SCNC: 143 MMOL/L — SIGNIFICANT CHANGE UP (ref 135–145)
TROPONIN T, HIGH SENSITIVITY RESULT: 18 NG/L — SIGNIFICANT CHANGE UP (ref 0–51)
TROPONIN T, HIGH SENSITIVITY RESULT: 19 NG/L — SIGNIFICANT CHANGE UP (ref 0–51)
WBC # BLD: 8.7 K/UL — SIGNIFICANT CHANGE UP (ref 3.8–10.5)
WBC # FLD AUTO: 8.7 K/UL — SIGNIFICANT CHANGE UP (ref 3.8–10.5)

## 2020-09-06 PROCEDURE — 93010 ELECTROCARDIOGRAM REPORT: CPT

## 2020-09-06 PROCEDURE — 99285 EMERGENCY DEPT VISIT HI MDM: CPT

## 2020-09-06 PROCEDURE — 99223 1ST HOSP IP/OBS HIGH 75: CPT

## 2020-09-06 PROCEDURE — 99406 BEHAV CHNG SMOKING 3-10 MIN: CPT

## 2020-09-06 PROCEDURE — 71046 X-RAY EXAM CHEST 2 VIEWS: CPT | Mod: 26

## 2020-09-06 RX ORDER — INSULIN GLARGINE 100 [IU]/ML
50 INJECTION, SOLUTION SUBCUTANEOUS AT BEDTIME
Refills: 0 | Status: DISCONTINUED | OUTPATIENT
Start: 2020-09-06 | End: 2020-09-10

## 2020-09-06 RX ORDER — ENOXAPARIN SODIUM 100 MG/ML
40 INJECTION SUBCUTANEOUS EVERY 12 HOURS
Refills: 0 | Status: DISCONTINUED | OUTPATIENT
Start: 2020-09-06 | End: 2020-09-06

## 2020-09-06 RX ORDER — INSULIN LISPRO 100/ML
VIAL (ML) SUBCUTANEOUS AT BEDTIME
Refills: 0 | Status: DISCONTINUED | OUTPATIENT
Start: 2020-09-06 | End: 2020-09-11

## 2020-09-06 RX ORDER — DEXTROSE 50 % IN WATER 50 %
15 SYRINGE (ML) INTRAVENOUS ONCE
Refills: 0 | Status: DISCONTINUED | OUTPATIENT
Start: 2020-09-06 | End: 2020-09-11

## 2020-09-06 RX ORDER — ALBUTEROL 90 UG/1
2 AEROSOL, METERED ORAL EVERY 6 HOURS
Refills: 0 | Status: DISCONTINUED | OUTPATIENT
Start: 2020-09-06 | End: 2020-09-11

## 2020-09-06 RX ORDER — BUPRENORPHINE AND NALOXONE 2; .5 MG/1; MG/1
0.5 TABLET SUBLINGUAL
Qty: 0 | Refills: 0 | DISCHARGE

## 2020-09-06 RX ORDER — ATORVASTATIN CALCIUM 80 MG/1
40 TABLET, FILM COATED ORAL AT BEDTIME
Refills: 0 | Status: DISCONTINUED | OUTPATIENT
Start: 2020-09-06 | End: 2020-09-11

## 2020-09-06 RX ORDER — LISINOPRIL 2.5 MG/1
10 TABLET ORAL DAILY
Refills: 0 | Status: DISCONTINUED | OUTPATIENT
Start: 2020-09-06 | End: 2020-09-11

## 2020-09-06 RX ORDER — ASPIRIN/CALCIUM CARB/MAGNESIUM 324 MG
81 TABLET ORAL DAILY
Refills: 0 | Status: DISCONTINUED | OUTPATIENT
Start: 2020-09-06 | End: 2020-09-11

## 2020-09-06 RX ORDER — DEXTROSE 50 % IN WATER 50 %
12.5 SYRINGE (ML) INTRAVENOUS ONCE
Refills: 0 | Status: DISCONTINUED | OUTPATIENT
Start: 2020-09-06 | End: 2020-09-11

## 2020-09-06 RX ORDER — SODIUM CHLORIDE 9 MG/ML
1000 INJECTION, SOLUTION INTRAVENOUS
Refills: 0 | Status: DISCONTINUED | OUTPATIENT
Start: 2020-09-06 | End: 2020-09-11

## 2020-09-06 RX ORDER — BUPRENORPHINE AND NALOXONE 2; .5 MG/1; MG/1
1 TABLET SUBLINGUAL
Qty: 0 | Refills: 0 | DISCHARGE

## 2020-09-06 RX ORDER — INFLUENZA VIRUS VACCINE 15; 15; 15; 15 UG/.5ML; UG/.5ML; UG/.5ML; UG/.5ML
0.5 SUSPENSION INTRAMUSCULAR ONCE
Refills: 0 | Status: DISCONTINUED | OUTPATIENT
Start: 2020-09-06 | End: 2020-09-11

## 2020-09-06 RX ORDER — GLIMEPIRIDE 1 MG
1 TABLET ORAL
Qty: 0 | Refills: 0 | DISCHARGE

## 2020-09-06 RX ORDER — DEXTROSE 50 % IN WATER 50 %
25 SYRINGE (ML) INTRAVENOUS ONCE
Refills: 0 | Status: DISCONTINUED | OUTPATIENT
Start: 2020-09-06 | End: 2020-09-11

## 2020-09-06 RX ORDER — METFORMIN HYDROCHLORIDE 850 MG/1
2 TABLET ORAL
Qty: 0 | Refills: 0 | DISCHARGE

## 2020-09-06 RX ORDER — FUROSEMIDE 40 MG
20 TABLET ORAL DAILY
Refills: 0 | Status: DISCONTINUED | OUTPATIENT
Start: 2020-09-06 | End: 2020-09-11

## 2020-09-06 RX ORDER — NADOLOL 80 MG/1
20 TABLET ORAL DAILY
Refills: 0 | Status: DISCONTINUED | OUTPATIENT
Start: 2020-09-06 | End: 2020-09-11

## 2020-09-06 RX ORDER — GLUCAGON INJECTION, SOLUTION 0.5 MG/.1ML
1 INJECTION, SOLUTION SUBCUTANEOUS ONCE
Refills: 0 | Status: DISCONTINUED | OUTPATIENT
Start: 2020-09-06 | End: 2020-09-11

## 2020-09-06 RX ORDER — ENOXAPARIN SODIUM 100 MG/ML
40 INJECTION SUBCUTANEOUS DAILY
Refills: 0 | Status: DISCONTINUED | OUTPATIENT
Start: 2020-09-06 | End: 2020-09-11

## 2020-09-06 RX ORDER — INSULIN LISPRO 100/ML
16 VIAL (ML) SUBCUTANEOUS
Refills: 0 | Status: DISCONTINUED | OUTPATIENT
Start: 2020-09-06 | End: 2020-09-09

## 2020-09-06 RX ORDER — INSULIN LISPRO 100/ML
VIAL (ML) SUBCUTANEOUS
Refills: 0 | Status: DISCONTINUED | OUTPATIENT
Start: 2020-09-06 | End: 2020-09-11

## 2020-09-06 RX ADMIN — ALBUTEROL 2 PUFF(S): 90 AEROSOL, METERED ORAL at 21:44

## 2020-09-06 RX ADMIN — Medication 81 MILLIGRAM(S): at 21:44

## 2020-09-06 RX ADMIN — INSULIN GLARGINE 50 UNIT(S): 100 INJECTION, SOLUTION SUBCUTANEOUS at 22:04

## 2020-09-06 RX ADMIN — ATORVASTATIN CALCIUM 40 MILLIGRAM(S): 80 TABLET, FILM COATED ORAL at 21:44

## 2020-09-06 NOTE — ED ADULT NURSE NOTE - OBJECTIVE STATEMENT
64 year old male with hx of tri[;e bypass hypertension chol DM comes in with shortness of breath for months getting worse and lower extremity swelling for over a year. pt has chest "pinching as he describes it and TRIPATHI.  getting worse. Pt sleeps with 2 pillows at night.  IVL placed and bloods sent as ordered tara

## 2020-09-06 NOTE — PATIENT PROFILE ADULT - PUBLIC BENEFITS
"Jennifer Tatum is a 30 year old female who is being evaluated via a billable telephone visit.      The patient has been notified of following:     \"This telephone visit will be conducted via a call between you and your physician/provider. We have found that certain health care needs can be provided without the need for a physical exam.  This service lets us provide the care you need with a short phone conversation.  If a prescription is necessary we can send it directly to your pharmacy.  If lab work is needed we can place an order for that and you can then stop by our lab to have the test done at a later time.    Telephone visits are billed at different rates depending on your insurance coverage. During this emergency period, for some insurers they may be billed the same as an in-person visit.  Please reach out to your insurance provider with any questions.    If during the course of the call the physician/provider feels a telephone visit is not appropriate, you will not be charged for this service.\"    Patient has given verbal consent for Telephone visit?  Yes    What phone number would you like to be contacted at? [phone]    How would you like to obtain your AVS? Andreahart    Subjective     Jennifer Tatum is a 30 year old female who presents via phone visit today for the following health issues:    HPI    Depression and Anxiety Follow-Up    How are you doing with your depression since your last visit? Improved     How are you doing with your anxiety since your last visit?  Worsened     Are you having other symptoms that might be associated with depression or anxiety? Yes:  really tired all of the time.     Have you had a significant life event? No     Do you have any concerns with your use of alcohol or other drugs? No     Stopped taking zoloft. Wanting to talk about something else to help with her anxiety.     Showing more s/s of agorophobia    Worse with Coronavirus    Vistaril helps but sedating     VINI worsen " off Zoloft            Social History     Tobacco Use     Smoking status: Current Every Day Smoker     Packs/day: 0.50     Years: 10.00     Pack years: 5.00     Types: Cigarettes     Smokeless tobacco: Never Used   Substance Use Topics     Alcohol use: No     Alcohol/week: 0.0 standard drinks     Drug use: No     PHQ 11/25/2019 1/14/2020 7/27/2020   PHQ-9 Total Score 16 9 6   Q9: Thoughts of better off dead/self-harm past 2 weeks Not at all Not at all Not at all   F/U: Thoughts of suicide or self-harm - - -   F/U: Safety concerns - - -     VINI-7 SCORE 11/25/2019 1/14/2020 7/27/2020   Total Score 20 19 15     Last PHQ-9 7/27/2020   1.  Little interest or pleasure in doing things 0   2.  Feeling down, depressed, or hopeless 0   3.  Trouble falling or staying asleep, or sleeping too much 3   4.  Feeling tired or having little energy 3   5.  Poor appetite or overeating 0   6.  Feeling bad about yourself 0   7.  Trouble concentrating 0   8.  Moving slowly or restless 0   Q9: Thoughts of better off dead/self-harm past 2 weeks 0   PHQ-9 Total Score 6   Difficulty at work, home, or with people Somewhat difficult   In the past two weeks have you had thoughts of suicide or self harm? -   Do you have concerns about your personal safety or the safety of others? -     VINI-7  7/27/2020   1. Feeling nervous, anxious, or on edge 3   2. Not being able to stop or control worrying 3   3. Worrying too much about different things 1   4. Trouble relaxing 3   5. Being so restless that it is hard to sit still 1   6. Becoming easily annoyed or irritable 1   7. Feeling afraid, as if something awful might happen 3   VINI-7 Total Score 15   If you checked any problems, how difficult have they made it for you to do your work, take care of things at home, or get along with other people? Extremely difficult           Medication Followup of adderal     Taking Medication as prescribed: yes    Side Effects:  None    Medication Helping Symptoms:  yes         Was to get tubal in March - cancelled due to Covid  Due for px.       Current Outpatient Medications   Medication Sig Dispense Refill     amphetamine-dextroamphetamine (ADDERALL XR) 25 MG 24 hr capsule Take 1 capsule (25 mg) by mouth daily 30 capsule 0     ARIPiprazole (ABILIFY) 10 MG tablet Take 1 tablet by mouth once daily 90 tablet 0     hydrOXYzine (ATARAX) 25 MG tablet 1-2 tabs every 6 hrs as needed anxiety 30 tablet 1     ketorolac (TORADOL) 10 MG tablet Take 1 tablet (10 mg) by mouth every 6 hours as needed for moderate pain 15 tablet 0     Allergies   Allergen Reactions     Wellbutrin [Bupropion] Other (See Comments)     Mood changes       Reviewed and updated as needed this visit by Provider         Review of Systems   Constitutional, HEENT, cardiovascular, pulmonary, gi and gu systems are negative, except as otherwise noted.       Objective   Reported vitals:  There were no vitals taken for this visit.   healthy, alert and no distress  PSYCH: Alert and oriented times 3; coherent speech, normal   rate and volume, able to articulate logical thoughts, able   to abstract reason, no tangential thoughts, no hallucinations   or delusions  Her affect is normal  RESP: No cough, no audible wheezing, able to talk in full sentences  Remainder of exam unable to be completed due to telephone visits            Assessment/Plan:    1. PTSD (post-traumatic stress disorder)  Stable and better with below. RF given   - ARIPiprazole (ABILIFY) 10 MG tablet; Take 1 tablet (10 mg) by mouth daily  Dispense: 90 tablet; Refill: 3    2. VINI (generalized anxiety disorder)  Worse with Covid and stopping Zoloft.  Says stopped Zoloft - said we would not refill it. Will restart and get back to her dose of 100mg daily   - ARIPiprazole (ABILIFY) 10 MG tablet; Take 1 tablet (10 mg) by mouth daily  Dispense: 90 tablet; Refill: 3  - sertraline (ZOLOFT) 100 MG tablet; 1/4 tab orally for 4 days then 1/2 tablet for 2 weeks then go to 1  tablet daily  Dispense: 90 tablet; Refill: 3    3. Attention deficit hyperactivity disorder (ADHD), predominantly inattentive type  Stable for years on current meds. RF done. See back in 6months. Call for another 3 month Rx when need next.   - amphetamine-dextroamphetamine (ADDERALL XR) 25 MG 24 hr capsule; Take 1 capsule (25 mg) by mouth daily  Dispense: 30 capsule; Refill: 0  - amphetamine-dextroamphetamine (ADDERALL XR) 25 MG 24 hr capsule; Take 1 capsule (25 mg) by mouth daily  Dispense: 30 capsule; Refill: 0  - amphetamine-dextroamphetamine (ADDERALL XR) 25 MG 24 hr capsule; Take 1 capsule (25 mg) by mouth daily  Dispense: 30 capsule; Refill: 0      Pt to re-schedule Tubal with Dr Grimm        No follow-ups on file.      Phone call duration:  9 minutes    Cornell Herron MD         no

## 2020-09-06 NOTE — H&P ADULT - NSICDXPASTMEDICALHX_GEN_ALL_CORE_FT
PAST MEDICAL HISTORY:  CAD (coronary artery disease)     COPD (chronic obstructive pulmonary disease)     Diabetic autonomic neuropathy associated with type 2 diabetes mellitus     DM2 (diabetes mellitus, type 2)     HTN (hypertension)

## 2020-09-06 NOTE — ED ADULT NURSE REASSESSMENT NOTE - NS ED NURSE REASSESS COMMENT FT1
Report received from AZEB Underwood. Pt is resting comfortably in bed asking for food. Pt BG will be checked and food will be given. Pt A&Ox4. Pt informed of POC and awaiting a bed upstairs and repeat Troponin. Pt has no complaints at this time. Pt safety maintained.

## 2020-09-06 NOTE — H&P ADULT - ATTENDING COMMENTS
Patient assigned to me by night hospitalist in charge for management and care for patient for this evening only. Care to be resumed by day hospitalist (Rufino Batres) in the morning and thereafter.

## 2020-09-06 NOTE — H&P ADULT - PROBLEM SELECTOR PLAN 1
CP and TRIPATHI, worse with exertion and improved with rest with associated palpitations, with multiple risk factors c/f worsening cardiac disease. currently CP free, delta troponin stable, EKG without acute changes     -ordered for nuclear stress in AM  -check TTE  -check A1c, lipid and TSH  - monitor on tele

## 2020-09-06 NOTE — H&P ADULT - PROBLEM SELECTOR PLAN 2
TRIPATHI and orthopnea x months, CXR negative, c/f possible cardiac ischemia vs HF vs primary lung disease - ?COPD vs SHAYY, OHS     -cardiac work-up as above  -check TTE   -consider pulmonology consult if cardiac work-up unrevealing, may benefit from PFTs   -c/w nebs as needed q6h

## 2020-09-06 NOTE — H&P ADULT - NSHPREVIEWOFSYSTEMS_GEN_ALL_CORE
REVIEW OF SYSTEMS:    CONSTITUTIONAL: No weakness, fevers, chills  EYES/ENT: No visual changes;  no throat pain   NECK: No pain or stiffness  RESPIRATORY: +chronic cough, +TRIPATHI  CARDIOVASCULAR: as per HPI  GASTROINTESTINAL: no nausea, vomiting, no abdominal pain, no BRBPR  GENITOURINARY: no hematuria, no dysuria  NEUROLOGICAL: no numbness, no headaches, no confusion   MUSCULOSKELETAL: no back pain, no focal weakness   SKIN: No rashes, or lesions   PSYCH: no anxiety, depression  HEME: no gum bleeding, no bruising

## 2020-09-06 NOTE — H&P ADULT - PROBLEM SELECTOR PLAN 3
check A1c  Lantus 50units bedtime, Humalog 16units with meals, titrate as needed   low ISS and monitor FS ac and hs

## 2020-09-06 NOTE — H&P ADULT - NSHPLABSRESULTS_GEN_ALL_CORE
Labs, imaging and EKG personally reviewed and interpreted by me.                           13.4   8.70  )-----------( 261      ( 06 Sep 2020 18:30 )             41.8     09-06    143  |  103  |  19  ----------------------------<  122<H>  4.1   |  30  |  0.67    Ca    9.4      06 Sep 2020 18:30  Mg     2.0     09-06    TPro  7.4  /  Alb  4.2  /  TBili  0.2  /  DBili  x   /  AST  17  /  ALT  19  /  AlkPhos  86  09-06      < from: Xray Chest 2 Views PA/Lat (09.06.20 @ 18:21) >  ******PRELIMINARY REPORT******        INTERPRETATION:  Clear lungs.

## 2020-09-06 NOTE — ED ADULT NURSE REASSESSMENT NOTE - NS ED NURSE REASSESS COMMENT FT1
Pt  pt given turkey sandwich and diet ginger ale for dinner. Pt awaiting bed. Pt denies any needs at this time.

## 2020-09-06 NOTE — ED PROVIDER NOTE - NS ED ROS FT
REVIEW OF SYSTEMS:  General:  no fever, no chills  HEENT: no headache, no vision changes  Cardiac: + chest pain, + palpitations  Respiratory: no cough, + shortness of breath  Gastrointestinal: no abdominal pain, no nausea, no vomiting, no diarrhea  Extremities: +extremity swelling, no extremity pain  -Deidre Torres PGY-2 REVIEW OF SYSTEMS:  General:  no fever, no chills  HEENT: no headache, no vision changes  Cardiac: + chest pain, + palpitations  Respiratory: no cough, + shortness of breath  Gastrointestinal: no abdominal pain, no nausea, no vomiting, no diarrhea  Extremities: +extremity swelling, no extremity pain  ROS otherwise negative except her HPI  -Deidre Torres PGY-2

## 2020-09-06 NOTE — H&P ADULT - NSHPSOCIALHISTORY_GEN_ALL_CORE
currently smokes 3-4 ciagrettes per day x 3-4 years, prior to that with 30pack years smoking hx; no etoh, no drugs   lives at home with wife and child   ambulates independently

## 2020-09-06 NOTE — ED PROVIDER NOTE - PROGRESS NOTE DETAILS
Deidre Torres MD PGY-3 spoke with Dr. Mccurdy. states if patient needs to be admitted, he should be admitted to [facility] and Dr. Mccurdy will see s consult Deidre Torres MD PGY-3 endorsed to Dr. Barger. 91% on RA, placed back on 2 L o2 with improvement to 96%

## 2020-09-06 NOTE — ED PROVIDER NOTE - OBJECTIVE STATEMENT
63 yo M PMH DM, HTN, HLD, CAD s/p triple bypass on ASA, with multiple hernia repairs in the past p/w exertional left sided chest "pinching", improved with rest associated with SOB and palpitations x a couple months, with worsening orthopnea and diaphoresis. States he sleeps sitting up. No active chest pain at this time. No fevers, chills, abd pain, n/v/d. Recently started on nadolol a few days ago for elevated heart rate, not atrial fibriallation. Smokes 4 cigarettes a day.  Endorses years of b/l LE swelling.     Meds: nadolol, furosemide 20 mg qd, statin, lisinopril    Cardiologist: Dr. Mccurdy 65 yo M PMH DM, HTN, HLD, CAD s/p triple bypass on ASA, with multiple hernia repairs in the past p/w exertional left sided chest "pinching", improved with rest associated with SOB and palpitations x a couple months, with worsening orthopnea and diaphoresis. States he sleeps sitting up. No active chest pain at this time. No fevers, chills, abd pain, n/v/d. Recently started on nadolol a few days ago for elevated heart rate, not atrial fibriallation. Smokes 4 cigarettes a day.  Endorses years of b/l LE swelling. Reportedly had normal stress test a few years ago    Meds: nadolol, furosemide 20 mg qd, statin, lisinopril    Cardiologist: Dr. Mccurdy 63 yo M PMH DM, HTN, HLD, CAD s/p triple bypass on ASA, with multiple hernia repairs in the past p/w exertional left sided chest "pinching", improved with rest associated with SOB and palpitations x a couple months, with worsening orthopnea and diaphoresis. States he sleeps sitting up. No active chest pain at this time. No fevers, chills, abd pain, n/v/d. Recently started on nadolol a few days ago for elevated heart rate, not atrial fibrillation. Smokes 4 cigarettes a day.  Endorses years of b/l LE swelling. Reportedly had normal stress test a few years ago    Meds: nadolol, furosemide 20 mg qd, statin, lisinopril    Cardiologist: Dr. Mccurdy

## 2020-09-06 NOTE — ED PROVIDER NOTE - ATTENDING CONTRIBUTION TO CARE
Yocasta Thompson MD - Attending Physician: I have personally seen and examined this patient with the resident/fellow.  I have fully participated in the care of this patient. I have reviewed all pertinent clinical information, including history, physical exam, plan and the Resident/Fellow’s note and agree except as noted. See MDM

## 2020-09-06 NOTE — H&P ADULT - ASSESSMENT
64M with PMH of CAD s/p CABG (2014), HTN, T2DM on insulin p/w months of progressive tingling CP and TRIPATHI, c/f cardiac ischemia, admitted for further work-up.

## 2020-09-06 NOTE — H&P ADULT - PROBLEM SELECTOR PLAN 6
30 pack year smoking hx, currently smoking 3-4 cigarettes per day  smoking cessation counselling provided for 5 minutes   nicotine patch if needed

## 2020-09-06 NOTE — ED PROVIDER NOTE - PHYSICAL EXAMINATION
PHYSICAL EXAM:   General: in no acute distress  HEENT: NC/AT, airway patent  Cardiovascular: regular rate and rhythm, + S1/S2, no murmurs, rubs, gallops appreciated  Respiratory: diminished at bases bilaterally  Abdominal: soft, obese abdomen, nontender, no rebound, guarding or rigidity  Extremities: +1 LE edema b/l.   Neuro: awake, alert, interactive, moving all extremities  Psychiatric: appropriate mood and affect.   -Deidre Torres PGY-2 PHYSICAL EXAM:   General: in no acute distress  HEENT: NC/AT, airway patent  Cardiovascular: regular rate and rhythm, + S1/S2, no murmurs, rubs, gallops appreciated  Respiratory: diminished at bases bilaterally.  Abdominal: soft, obese abdomen, nontender, no rebound, guarding or rigidity  Extremities: +1 LE edema b/l.   Neuro: awake, alert, interactive, moving all extremities  Psychiatric: appropriate mood and affect.   -Deidre Torres PGY-2

## 2020-09-06 NOTE — H&P ADULT - NSHPPHYSICALEXAM_GEN_ALL_CORE
Vital Signs Last 24 Hrs  T(C): 36.8 (06 Sep 2020 20:47), Max: 36.8 (06 Sep 2020 19:08)  T(F): 98.2 (06 Sep 2020 20:47), Max: 98.3 (06 Sep 2020 19:08)  HR: 73 (06 Sep 2020 20:47) (73 - 90)  BP: 107/69 (06 Sep 2020 20:47) (107/69 - 122/72)  BP(mean): --  RR: 18 (06 Sep 2020 20:47) (16 - 20)  SpO2: 97% (06 Sep 2020 20:47) (93% - 97%)    PHYSICAL EXAM:  GENERAL: NAD, well-developed  HEAD:  Atraumatic, normocephalic  EYES: EOMI, conjunctiva and sclera clear  NECK: Supple, no JVD  CHEST/LUNG: Clear to auscultation bilaterally; no wheezing or rales  HEART: Regular rate and rhythm; no murmurs  ABDOMEN: Soft, nontender, nondistended; bowel sounds present  EXTREMITIES:  2+ Peripheral Pulses, no edema  PSYCH: calm affect, not anxious  NEUROLOGY: non-focal, AAOx3  SKIN: No rashes or lesions  MUSCULOSKELETAL: no back pain, moving all extremities

## 2020-09-06 NOTE — ED PROVIDER NOTE - CLINICAL SUMMARY MEDICAL DECISION MAKING FREE TEXT BOX
63 yo M PMH DM, HTN, HLD, CAD s/p triple bypass on ASA, with multiple hernia repairs in the past p/w exertional left sided chest "pinching", improved with rest associated with SOB and palpitations x a couple months, with worsening orthopnea and diaphoresis. Concern for stable angina vs new HF. will get labs, Xay, ekg/cardiac monitor/trop. No chest pain at this time. Reassess for CDU vs admission. 65 yo M PMH DM, HTN, HLD, CAD s/p triple bypass on ASA, with multiple hernia repairs in the past p/w exertional left sided chest "pinching", improved with rest associated with SOB and palpitations x a couple months, with worsening orthopnea and diaphoresis. Concern for stable angina vs new HF. will get labs, Xay, ekg/cardiac monitor/trop. No chest pain at this time. Reassess for CDU vs admission.    Yocasta Thompson MD - Attending Physician: Pt here with progressive Orthopnea, initial CP with exertion now with increasing rest time required for resolution. Followed by Dr Mccurdy. EKG without STEMI on arrival. Labs, Xr, d/w Cards

## 2020-09-07 LAB
A1C WITH ESTIMATED AVERAGE GLUCOSE RESULT: 10.1 % — HIGH (ref 4–5.6)
ANION GAP SERPL CALC-SCNC: 11 MMOL/L — SIGNIFICANT CHANGE UP (ref 5–17)
BUN SERPL-MCNC: 18 MG/DL — SIGNIFICANT CHANGE UP (ref 7–23)
CALCIUM SERPL-MCNC: 9.2 MG/DL — SIGNIFICANT CHANGE UP (ref 8.4–10.5)
CHLORIDE SERPL-SCNC: 102 MMOL/L — SIGNIFICANT CHANGE UP (ref 96–108)
CHOLEST SERPL-MCNC: 113 MG/DL — SIGNIFICANT CHANGE UP (ref 10–199)
CK MB BLD-MCNC: 5.3 % — HIGH (ref 0–3.5)
CK MB CFR SERPL CALC: 4.1 NG/ML — SIGNIFICANT CHANGE UP (ref 0–6.7)
CK SERPL-CCNC: 77 U/L — SIGNIFICANT CHANGE UP (ref 30–200)
CO2 SERPL-SCNC: 28 MMOL/L — SIGNIFICANT CHANGE UP (ref 22–31)
CREAT SERPL-MCNC: 0.67 MG/DL — SIGNIFICANT CHANGE UP (ref 0.5–1.3)
ESTIMATED AVERAGE GLUCOSE: 243 MG/DL — HIGH (ref 68–114)
GLUCOSE BLDC GLUCOMTR-MCNC: 113 MG/DL — HIGH (ref 70–99)
GLUCOSE BLDC GLUCOMTR-MCNC: 219 MG/DL — HIGH (ref 70–99)
GLUCOSE BLDC GLUCOMTR-MCNC: 221 MG/DL — HIGH (ref 70–99)
GLUCOSE BLDC GLUCOMTR-MCNC: 253 MG/DL — HIGH (ref 70–99)
GLUCOSE SERPL-MCNC: 192 MG/DL — HIGH (ref 70–99)
HCT VFR BLD CALC: 40.9 % — SIGNIFICANT CHANGE UP (ref 39–50)
HCV AB S/CO SERPL IA: 0.07 S/CO — SIGNIFICANT CHANGE UP (ref 0–0.99)
HCV AB SERPL-IMP: SIGNIFICANT CHANGE UP
HDLC SERPL-MCNC: 36 MG/DL — LOW
HGB BLD-MCNC: 12.6 G/DL — LOW (ref 13–17)
LIPID PNL WITH DIRECT LDL SERPL: 51 MG/DL — SIGNIFICANT CHANGE UP
MAGNESIUM SERPL-MCNC: 1.9 MG/DL — SIGNIFICANT CHANGE UP (ref 1.6–2.6)
MCHC RBC-ENTMCNC: 27.2 PG — SIGNIFICANT CHANGE UP (ref 27–34)
MCHC RBC-ENTMCNC: 30.8 GM/DL — LOW (ref 32–36)
MCV RBC AUTO: 88.1 FL — SIGNIFICANT CHANGE UP (ref 80–100)
NRBC # BLD: 0 /100 WBCS — SIGNIFICANT CHANGE UP (ref 0–0)
PHOSPHATE SERPL-MCNC: 3.7 MG/DL — SIGNIFICANT CHANGE UP (ref 2.5–4.5)
PLATELET # BLD AUTO: 232 K/UL — SIGNIFICANT CHANGE UP (ref 150–400)
POTASSIUM SERPL-MCNC: 3.9 MMOL/L — SIGNIFICANT CHANGE UP (ref 3.5–5.3)
POTASSIUM SERPL-SCNC: 3.9 MMOL/L — SIGNIFICANT CHANGE UP (ref 3.5–5.3)
RBC # BLD: 4.64 M/UL — SIGNIFICANT CHANGE UP (ref 4.2–5.8)
RBC # FLD: 13.4 % — SIGNIFICANT CHANGE UP (ref 10.3–14.5)
SARS-COV-2 IGG SERPL QL IA: NEGATIVE — SIGNIFICANT CHANGE UP
SARS-COV-2 IGM SERPL IA-ACNC: <3.8 AU/ML — SIGNIFICANT CHANGE UP
SODIUM SERPL-SCNC: 141 MMOL/L — SIGNIFICANT CHANGE UP (ref 135–145)
TOTAL CHOLESTEROL/HDL RATIO MEASUREMENT: 3.1 RATIO — LOW (ref 3.4–9.6)
TRIGL SERPL-MCNC: 128 MG/DL — SIGNIFICANT CHANGE UP (ref 10–149)
TROPONIN T, HIGH SENSITIVITY RESULT: 15 NG/L — SIGNIFICANT CHANGE UP (ref 0–51)
TSH SERPL-MCNC: 4.2 UIU/ML — SIGNIFICANT CHANGE UP (ref 0.27–4.2)
WBC # BLD: 8.74 K/UL — SIGNIFICANT CHANGE UP (ref 3.8–10.5)
WBC # FLD AUTO: 8.74 K/UL — SIGNIFICANT CHANGE UP (ref 3.8–10.5)

## 2020-09-07 PROCEDURE — 93010 ELECTROCARDIOGRAM REPORT: CPT

## 2020-09-07 RX ORDER — ACETAMINOPHEN 500 MG
650 TABLET ORAL EVERY 6 HOURS
Refills: 0 | Status: DISCONTINUED | OUTPATIENT
Start: 2020-09-07 | End: 2020-09-11

## 2020-09-07 RX ADMIN — Medication 650 MILLIGRAM(S): at 23:30

## 2020-09-07 RX ADMIN — Medication 16 UNIT(S): at 17:53

## 2020-09-07 RX ADMIN — Medication 81 MILLIGRAM(S): at 12:27

## 2020-09-07 RX ADMIN — ENOXAPARIN SODIUM 40 MILLIGRAM(S): 100 INJECTION SUBCUTANEOUS at 12:27

## 2020-09-07 RX ADMIN — INSULIN GLARGINE 50 UNIT(S): 100 INJECTION, SOLUTION SUBCUTANEOUS at 21:54

## 2020-09-07 RX ADMIN — Medication 3: at 13:31

## 2020-09-07 RX ADMIN — Medication 2: at 17:53

## 2020-09-07 RX ADMIN — Medication 16 UNIT(S): at 13:31

## 2020-09-07 RX ADMIN — ATORVASTATIN CALCIUM 40 MILLIGRAM(S): 80 TABLET, FILM COATED ORAL at 21:03

## 2020-09-07 RX ADMIN — LISINOPRIL 10 MILLIGRAM(S): 2.5 TABLET ORAL at 05:08

## 2020-09-07 RX ADMIN — Medication 20 MILLIGRAM(S): at 05:08

## 2020-09-07 RX ADMIN — Medication 650 MILLIGRAM(S): at 22:53

## 2020-09-07 RX ADMIN — NADOLOL 20 MILLIGRAM(S): 80 TABLET ORAL at 05:08

## 2020-09-07 NOTE — CONSULT NOTE ADULT - ASSESSMENT
SOB   likely multifactorial   symptoms behave like acute volume overload however, chest xray clear, proBNP is very low   has evidence of co2 retaining   may need inhaled steroids vs steroids  agree with echo , stress test  pulm eval     CAD s/p cabg   cont asa, statin   plan as above    COPD exacerbation   plan as above  pulm eval     HTN  stable     DMII  check hga1c  can use endo eval   Monitor finger stick. Insulin coverage. Diabetic education and Diabetic diet. Consider nutrition consultation.     Tachycardia   cont nadolol      Advanced care planning was discussed with patient and family.  Risks, benefits and alternatives of the cardiac treatments and medical therapy including procedures were discussed in detail and all questions were answered. Importance of compliance with medical therapy and lifestyle modification to improve cardiovascular health were addressed. Appropriate forms and patient educational materials were reviewed. 30 minutes face to face spent.

## 2020-09-07 NOTE — CONSULT NOTE ADULT - SUBJECTIVE AND OBJECTIVE BOX
CHIEF COMPLAINT:Patient is a 64y old  Male who presents with a chief complaint of CP, TRIPATHI (06 Sep 2020 21:09)      HISTORY OF PRESENT ILLNESS:    64 male with history as below known to me from office with obesity , asthma, copd, uncontrolled DM II   CAD history of CABG has been having TRIPATHI and Orthopnea  no chest pain ]  no dizziness  no syncope     PAST MEDICAL & SURGICAL HISTORY:  Diabetic autonomic neuropathy associated with type 2 diabetes mellitus  CAD (coronary artery disease)  HTN (hypertension)  DM2 (diabetes mellitus, type 2)  COPD (chronic obstructive pulmonary disease)  S/P CABG x 2: Dr Ashley  H/O umbilical hernia repair: x2          MEDICATIONS:  aspirin enteric coated 81 milliGRAM(s) Oral daily  enoxaparin Injectable 40 milliGRAM(s) SubCutaneous daily  furosemide    Tablet 20 milliGRAM(s) Oral daily  lisinopril 10 milliGRAM(s) Oral daily  nadolol 20 milliGRAM(s) Oral daily      ALBUTerol    90 MICROgram(s) HFA Inhaler 2 Puff(s) Inhalation every 6 hours PRN        atorvastatin 40 milliGRAM(s) Oral at bedtime  dextrose 40% Gel 15 Gram(s) Oral once PRN  dextrose 50% Injectable 12.5 Gram(s) IV Push once  dextrose 50% Injectable 25 Gram(s) IV Push once  dextrose 50% Injectable 25 Gram(s) IV Push once  glucagon  Injectable 1 milliGRAM(s) IntraMuscular once PRN  insulin glargine Injectable (LANTUS) 50 Unit(s) SubCutaneous at bedtime  insulin lispro (HumaLOG) corrective regimen sliding scale   SubCutaneous three times a day before meals  insulin lispro (HumaLOG) corrective regimen sliding scale   SubCutaneous at bedtime  insulin lispro Injectable (HumaLOG) 16 Unit(s) SubCutaneous three times a day before meals    dextrose 5%. 1000 milliLiter(s) IV Continuous <Continuous>  influenza   Vaccine 0.5 milliLiter(s) IntraMuscular once      FAMILY HISTORY:  Family history of diabetes mellitus      Non-contributory    SOCIAL HISTORY:    No tobacco, drugs or etoh    Allergies    No Known Allergies    Intolerances    	    REVIEW OF SYSTEMS:  as above  The rest of the 14 points ROS reviewed and except above they are unremarkable.        PHYSICAL EXAM:  T(C): 36.6 (09-07-20 @ 04:18), Max: 36.8 (09-06-20 @ 19:08)  HR: 81 (09-07-20 @ 04:18) (73 - 90)  BP: 104/62 (09-07-20 @ 04:18) (104/62 - 150/77)  RR: 18 (09-07-20 @ 04:18) (16 - 20)  SpO2: 97% (09-07-20 @ 04:18) (90% - 97%)  Wt(kg): --  I&O's Summary      JVP: Normal  Neck: supple  Lung: clear   CV: S1 S2 , Murmur:  Abd: soft  Ext: No edema  neuro: Awake / alert  Psych: flat affect  Skin: normal      LABS/DATA:    TELEMETRY: 	  sinus   ECG:  	   	  CARDIAC MARKERS:                        19 <<== 09-06-20 @ 19:56                18 <<== 09-06-20 @ 18:30                              12.6   8.74  )-----------( 232      ( 07 Sep 2020 05:37 )             40.9     09-07    141  |  102  |  18  ----------------------------<  192<H>  3.9   |  28  |  0.67    Ca    9.2      07 Sep 2020 05:37  Phos  3.7     09-07  Mg     1.9     09-07    TPro  7.4  /  Alb  4.2  /  TBili  0.2  /  DBili  x   /  AST  17  /  ALT  19  /  AlkPhos  86  09-06    proBNP: Serum Pro-Brain Natriuretic Peptide: 42 pg/mL (09-06 @ 18:30)    Lipid Profile:   HgA1c:   TSH:   < from: Cardiac Cath Lab - Adult (04.19.16 @ 15:07) >  CORONARY VESSELS: The coronary circulation is right dominant.  LM:   --  LM: Angiography showed minor luminal irregularities with no flow  limiting lesions.  LAD:   --  Mid LAD: There was a 99 % stenosis.  CX:   --  Mid circumflex: There was a 99 % stenosis.  RCA:   --  RCA: Angiography showed minor luminal irregularities with no  flow limiting lesions.  GRAFTS:   --  Graft to the mid LAD: The graft was a LIMA. Graft angiography  showed minor luminal irregularities.  --  Graft to the 1st diagonal: The graft was a saphenous vein graft from  theaorta. Graft angiography showed minor luminal irregularities.  --  Graft to the 1st obtuse marginal: The graft was a saphenous vein graft  from the aorta. Graft angiography showed minor luminal irregularities.  COMPLICATIONS: There were no complications. No complications occurred  during the cath lab visit.  DIAGNOSTIC RECOMMENDATIONS: The patient should continue with the present  medications.  Prepared and signed by  Mil Ruff M.D.    < end of copied text >

## 2020-09-07 NOTE — PROGRESS NOTE ADULT - ASSESSMENT
64M with PMH of CAD s/p CABG (2014), HTN, T2DM on insulin p/w months of progressive tingling CP and TRIPATHI, c/f cardiac ischemia, admitted for further work-up.     Problem/Plan - 1:  ·  Problem: Chest pain.  Plan: telemonitor   - cards fu   - ischemia lópez as per cards     Problem/Plan - 2:  ·  Problem: Dyspnea on exertion.  Plan: pulmonary fu   -c/w nebs as needed q6h.     Problem/Plan - 3:  ·  Problem: Type 2 diabetes mellitus without complication, with long-term current use of insulin.  Plan: basal, bolus   monitor FS    Problem/Plan - 4:  ·  Problem: Essential hypertension.  Plan: BP stable  c/w home medications   monitor routinely.     Problem/Plan - 5:  ·  Problem: CAD (coronary artery disease).  Plan: s/p CABG 2014  c/w ASA and stain   CP work-up as above.     Problem/Plan - 6:  Problem: Current smoker. Plan: 30 pack year smoking hx, currently smoking 3-4 cigarettes per day  smoking cessation counselling provided for 5 minutes   nicotine patch if needed.

## 2020-09-07 NOTE — PROGRESS NOTE ADULT - SUBJECTIVE AND OBJECTIVE BOX
Patient is a 64y old  Male who presents with a chief complaint of CP, TRIPATHI (07 Sep 2020 06:54)      INTERVAL HPI/OVERNIGHT EVENTS:  T(C): 36.6 (09-07-20 @ 14:45), Max: 37.1 (09-07-20 @ 13:15)  HR: 76 (09-07-20 @ 14:45) (73 - 81)  BP: 98/64 (09-07-20 @ 14:45) (98/64 - 150/77)  RR: 18 (09-07-20 @ 14:45) (16 - 18)  SpO2: 98% (09-07-20 @ 14:45) (90% - 98%)  Wt(kg): --  I&O's Summary    07 Sep 2020 07:01  -  07 Sep 2020 18:50  --------------------------------------------------------  IN: 480 mL / OUT: 450 mL / NET: 30 mL        LABS:                        12.6   8.74  )-----------( 232      ( 07 Sep 2020 05:37 )             40.9     09-07    141  |  102  |  18  ----------------------------<  192<H>  3.9   |  28  |  0.67    Ca    9.2      07 Sep 2020 05:37  Phos  3.7     09-07  Mg     1.9     09-07    TPro  7.4  /  Alb  4.2  /  TBili  0.2  /  DBili  x   /  AST  17  /  ALT  19  /  AlkPhos  86  09-06        CAPILLARY BLOOD GLUCOSE      POCT Blood Glucose.: 219 mg/dL (07 Sep 2020 17:23)  POCT Blood Glucose.: 253 mg/dL (07 Sep 2020 12:47)  POCT Blood Glucose.: 113 mg/dL (07 Sep 2020 08:27)  POCT Blood Glucose.: 178 mg/dL (06 Sep 2020 21:26)  POCT Blood Glucose.: 108 mg/dL (06 Sep 2020 19:48)            MEDICATIONS  (STANDING):  aspirin enteric coated 81 milliGRAM(s) Oral daily  atorvastatin 40 milliGRAM(s) Oral at bedtime  dextrose 5%. 1000 milliLiter(s) (50 mL/Hr) IV Continuous <Continuous>  dextrose 50% Injectable 12.5 Gram(s) IV Push once  dextrose 50% Injectable 25 Gram(s) IV Push once  dextrose 50% Injectable 25 Gram(s) IV Push once  enoxaparin Injectable 40 milliGRAM(s) SubCutaneous daily  furosemide    Tablet 20 milliGRAM(s) Oral daily  influenza   Vaccine 0.5 milliLiter(s) IntraMuscular once  insulin glargine Injectable (LANTUS) 50 Unit(s) SubCutaneous at bedtime  insulin lispro (HumaLOG) corrective regimen sliding scale   SubCutaneous three times a day before meals  insulin lispro (HumaLOG) corrective regimen sliding scale   SubCutaneous at bedtime  insulin lispro Injectable (HumaLOG) 16 Unit(s) SubCutaneous three times a day before meals  lisinopril 10 milliGRAM(s) Oral daily  nadolol 20 milliGRAM(s) Oral daily    MEDICATIONS  (PRN):  acetaminophen   Tablet .. 650 milliGRAM(s) Oral every 6 hours PRN Temp greater or equal to 38C (100.4F), Moderate Pain (4 - 6)  ALBUTerol    90 MICROgram(s) HFA Inhaler 2 Puff(s) Inhalation every 6 hours PRN Shortness of Breath and/or Wheezing  dextrose 40% Gel 15 Gram(s) Oral once PRN Blood Glucose LESS THAN 70 milliGRAM(s)/deciliter  glucagon  Injectable 1 milliGRAM(s) IntraMuscular once PRN Glucose LESS THAN 70 milligrams/deciliter          PHYSICAL EXAM:  GENERAL: NAD, well-groomed, well-developed  HEAD:  Atraumatic, Normocephalic  CHEST/LUNG: Clear to percussion bilaterally; No rales, rhonchi, wheezing, or rubs  HEART: Regular rate and rhythm; No murmurs, rubs, or gallops  ABDOMEN: Soft, Nontender, Nondistended; Bowel sounds present  EXTREMITIES:  2+ Peripheral Pulses, No clubbing, cyanosis, or edema  LYMPH: No lymphadenopathy noted  SKIN: No rashes or lesions    Care Discussed with Consultants/Other Providers [ ] YES  [ ] NO

## 2020-09-08 LAB
GLUCOSE BLDC GLUCOMTR-MCNC: 104 MG/DL — HIGH (ref 70–99)
GLUCOSE BLDC GLUCOMTR-MCNC: 154 MG/DL — HIGH (ref 70–99)
GLUCOSE BLDC GLUCOMTR-MCNC: 160 MG/DL — HIGH (ref 70–99)
GLUCOSE BLDC GLUCOMTR-MCNC: 185 MG/DL — HIGH (ref 70–99)

## 2020-09-08 PROCEDURE — 93306 TTE W/DOPPLER COMPLETE: CPT | Mod: 26

## 2020-09-08 RX ORDER — LANOLIN ALCOHOL/MO/W.PET/CERES
3 CREAM (GRAM) TOPICAL ONCE
Refills: 0 | Status: COMPLETED | OUTPATIENT
Start: 2020-09-08 | End: 2020-09-08

## 2020-09-08 RX ORDER — ACETAMINOPHEN 500 MG
975 TABLET ORAL ONCE
Refills: 0 | Status: COMPLETED | OUTPATIENT
Start: 2020-09-08 | End: 2020-09-08

## 2020-09-08 RX ADMIN — Medication 1: at 13:11

## 2020-09-08 RX ADMIN — Medication 3 MILLIGRAM(S): at 21:11

## 2020-09-08 RX ADMIN — Medication 16 UNIT(S): at 13:11

## 2020-09-08 RX ADMIN — Medication 16 UNIT(S): at 08:20

## 2020-09-08 RX ADMIN — Medication 81 MILLIGRAM(S): at 13:12

## 2020-09-08 RX ADMIN — Medication 1: at 08:20

## 2020-09-08 RX ADMIN — Medication 20 MILLIGRAM(S): at 05:07

## 2020-09-08 RX ADMIN — ENOXAPARIN SODIUM 40 MILLIGRAM(S): 100 INJECTION SUBCUTANEOUS at 13:12

## 2020-09-08 RX ADMIN — Medication 3 MILLIGRAM(S): at 01:08

## 2020-09-08 RX ADMIN — NADOLOL 20 MILLIGRAM(S): 80 TABLET ORAL at 05:07

## 2020-09-08 RX ADMIN — LISINOPRIL 10 MILLIGRAM(S): 2.5 TABLET ORAL at 05:07

## 2020-09-08 RX ADMIN — Medication 975 MILLIGRAM(S): at 21:11

## 2020-09-08 RX ADMIN — Medication 975 MILLIGRAM(S): at 21:45

## 2020-09-08 RX ADMIN — ATORVASTATIN CALCIUM 40 MILLIGRAM(S): 80 TABLET, FILM COATED ORAL at 21:11

## 2020-09-08 RX ADMIN — Medication 16 UNIT(S): at 17:52

## 2020-09-08 RX ADMIN — INSULIN GLARGINE 50 UNIT(S): 100 INJECTION, SOLUTION SUBCUTANEOUS at 21:49

## 2020-09-08 NOTE — PROGRESS NOTE ADULT - ASSESSMENT
SOB   likely multifactorial   symptoms behave like acute volume overload however, chest xray clear, proBNP is very low   has evidence of co2 retaining   may need inhaled steroids vs steroids  agree with echo , stress test  pulm eval     CAD s/p cabg   cont asa, statin   plan as above    COPD exacerbation   plan as above  pulm eval     HTN  stable     DMII  check hga1c  can use endo eval   Monitor finger stick. Insulin coverage.    Tachycardia   cont nadolol      Advanced care planning was discussed with patient and family.  Risks, benefits and alternatives of the cardiac treatments and medical therapy including procedures were discussed in detail and all questions were answered. Importance of compliance with medical therapy and lifestyle modification to improve cardiovascular health were addressed. Appropriate forms and patient educational materials were reviewed. 30 minutes face to face spent.

## 2020-09-08 NOTE — PROGRESS NOTE ADULT - SUBJECTIVE AND OBJECTIVE BOX
Subjective: Patient seen and examined. No new events except as noted.     SUBJECTIVE/ROS:  no new events       MEDICATIONS:  MEDICATIONS  (STANDING):  aspirin enteric coated 81 milliGRAM(s) Oral daily  atorvastatin 40 milliGRAM(s) Oral at bedtime  dextrose 5%. 1000 milliLiter(s) (50 mL/Hr) IV Continuous <Continuous>  dextrose 50% Injectable 12.5 Gram(s) IV Push once  dextrose 50% Injectable 25 Gram(s) IV Push once  dextrose 50% Injectable 25 Gram(s) IV Push once  enoxaparin Injectable 40 milliGRAM(s) SubCutaneous daily  furosemide    Tablet 20 milliGRAM(s) Oral daily  influenza   Vaccine 0.5 milliLiter(s) IntraMuscular once  insulin glargine Injectable (LANTUS) 50 Unit(s) SubCutaneous at bedtime  insulin lispro (HumaLOG) corrective regimen sliding scale   SubCutaneous three times a day before meals  insulin lispro (HumaLOG) corrective regimen sliding scale   SubCutaneous at bedtime  insulin lispro Injectable (HumaLOG) 16 Unit(s) SubCutaneous three times a day before meals  lisinopril 10 milliGRAM(s) Oral daily  nadolol 20 milliGRAM(s) Oral daily      PHYSICAL EXAM:  T(C): 36.3 (09-08-20 @ 04:06), Max: 37.1 (09-07-20 @ 13:15)  HR: 70 (09-08-20 @ 04:06) (70 - 76)  BP: 114/72 (09-08-20 @ 04:06) (98/64 - 117/72)  RR: 18 (09-08-20 @ 04:06) (17 - 18)  SpO2: 94% (09-08-20 @ 04:06) (92% - 98%)  Wt(kg): --  I&O's Summary    07 Sep 2020 07:01  -  08 Sep 2020 07:00  --------------------------------------------------------  IN: 1020 mL / OUT: 1050 mL / NET: -30 mL            JVP: Normal  Neck: supple  Lung: clear   CV: S1 S2 , Murmur:  Abd: soft  Ext: No edema  neuro: Awake / alert  Psych: flat affect  Skin: normal``    LABS/DATA:    CARDIAC MARKERS:  CARDIAC MARKERS ( 07 Sep 2020 14:49 )  x     / x     / 77 U/L / x     / 4.1 ng/mL                                12.6   8.74  )-----------( 232      ( 07 Sep 2020 05:37 )             40.9     09-07    141  |  102  |  18  ----------------------------<  192<H>  3.9   |  28  |  0.67    Ca    9.2      07 Sep 2020 05:37  Phos  3.7     09-07  Mg     1.9     09-07    TPro  7.4  /  Alb  4.2  /  TBili  0.2  /  DBili  x   /  AST  17  /  ALT  19  /  AlkPhos  86  09-06    proBNP:   Lipid Profile:   HgA1c:   TSH: Thyroid Stimulating Hormone, Serum: 4.20 uIU/mL (09-07 @ 08:32)      TELE:  EKG:

## 2020-09-08 NOTE — PROGRESS NOTE ADULT - SUBJECTIVE AND OBJECTIVE BOX
Patient is a 64y old  Male who presents with a chief complaint of CP, TRIPATHI (08 Sep 2020 07:03)      INTERVAL HPI/OVERNIGHT EVENTS:  T(C): 36.8 (09-08-20 @ 13:10), Max: 36.8 (09-08-20 @ 13:10)  HR: 70 (09-08-20 @ 13:10) (70 - 76)  BP: 109/65 (09-08-20 @ 13:10) (98/64 - 114/72)  RR: 18 (09-08-20 @ 13:10) (18 - 18)  SpO2: 96% (09-08-20 @ 13:10) (92% - 98%)  Wt(kg): --  I&O's Summary    07 Sep 2020 07:01  -  08 Sep 2020 07:00  --------------------------------------------------------  IN: 1020 mL / OUT: 1050 mL / NET: -30 mL    08 Sep 2020 07:01  -  08 Sep 2020 13:24  --------------------------------------------------------  IN: 240 mL / OUT: 300 mL / NET: -60 mL        LABS:                        12.6   8.74  )-----------( 232      ( 07 Sep 2020 05:37 )             40.9     09-07    141  |  102  |  18  ----------------------------<  192<H>  3.9   |  28  |  0.67    Ca    9.2      07 Sep 2020 05:37  Phos  3.7     09-07  Mg     1.9     09-07    TPro  7.4  /  Alb  4.2  /  TBili  0.2  /  DBili  x   /  AST  17  /  ALT  19  /  AlkPhos  86  09-06        CAPILLARY BLOOD GLUCOSE      POCT Blood Glucose.: 154 mg/dL (08 Sep 2020 12:15)  POCT Blood Glucose.: 160 mg/dL (08 Sep 2020 07:34)  POCT Blood Glucose.: 221 mg/dL (07 Sep 2020 21:47)  POCT Blood Glucose.: 219 mg/dL (07 Sep 2020 17:23)            MEDICATIONS  (STANDING):  aspirin enteric coated 81 milliGRAM(s) Oral daily  atorvastatin 40 milliGRAM(s) Oral at bedtime  dextrose 5%. 1000 milliLiter(s) (50 mL/Hr) IV Continuous <Continuous>  dextrose 50% Injectable 12.5 Gram(s) IV Push once  dextrose 50% Injectable 25 Gram(s) IV Push once  dextrose 50% Injectable 25 Gram(s) IV Push once  enoxaparin Injectable 40 milliGRAM(s) SubCutaneous daily  furosemide    Tablet 20 milliGRAM(s) Oral daily  influenza   Vaccine 0.5 milliLiter(s) IntraMuscular once  insulin glargine Injectable (LANTUS) 50 Unit(s) SubCutaneous at bedtime  insulin lispro (HumaLOG) corrective regimen sliding scale   SubCutaneous three times a day before meals  insulin lispro (HumaLOG) corrective regimen sliding scale   SubCutaneous at bedtime  insulin lispro Injectable (HumaLOG) 16 Unit(s) SubCutaneous three times a day before meals  lisinopril 10 milliGRAM(s) Oral daily  nadolol 20 milliGRAM(s) Oral daily    MEDICATIONS  (PRN):  acetaminophen   Tablet .. 650 milliGRAM(s) Oral every 6 hours PRN Temp greater or equal to 38C (100.4F), Moderate Pain (4 - 6)  ALBUTerol    90 MICROgram(s) HFA Inhaler 2 Puff(s) Inhalation every 6 hours PRN Shortness of Breath and/or Wheezing  dextrose 40% Gel 15 Gram(s) Oral once PRN Blood Glucose LESS THAN 70 milliGRAM(s)/deciliter  glucagon  Injectable 1 milliGRAM(s) IntraMuscular once PRN Glucose LESS THAN 70 milligrams/deciliter          PHYSICAL EXAM:  GENERAL: NAD, well-groomed, well-developed  HEAD:  Atraumatic, Normocephalic  CHEST/LUNG: Clear to percussion bilaterally; No rales, rhonchi, wheezing, or rubs  HEART: Regular rate and rhythm; No murmurs, rubs, or gallops  ABDOMEN: Soft, Nontender, Nondistended; Bowel sounds present  EXTREMITIES:  2+ Peripheral Pulses, No clubbing, cyanosis, or edema  LYMPH: No lymphadenopathy noted  SKIN: No rashes or lesions    Care Discussed with Consultants/Other Providers [ ] YES  [ ] NO

## 2020-09-08 NOTE — PROGRESS NOTE ADULT - ASSESSMENT
64M with PMH of CAD s/p CABG (2014), HTN, T2DM on insulin p/w months of progressive tingling CP and TRIPATHI, c/f cardiac ischemia, admitted for further work-up.     Problem/Plan - 1:  ·  Problem: Chest pain.  Plan: telemonitor   - cards fu   - ischemia lópez as per cards   - stress planned today     Problem/Plan - 2:  ·  Problem: Dyspnea on exertion.  Plan: pulmonary fu   -c/w nebs as needed q6h.     Problem/Plan - 3:  ·  Problem: Type 2 diabetes mellitus without complication, with long-term current use of insulin.  Plan: basal, bolus   monitor FS    Problem/Plan - 4:  ·  Problem: Essential hypertension.  Plan: BP stable  c/w home medications   monitor routinely.     Problem/Plan - 5:  ·  Problem: CAD (coronary artery disease).  Plan: s/p CABG 2014  c/w ASA and stain   CP work-up as above.     Problem/Plan - 6:  Problem: Current smoker. Plan: 30 pack year smoking hx, currently smoking 3-4 cigarettes per day  smoking cessation counselling provided for 5 minutes   nicotine patch if needed.

## 2020-09-09 LAB
ANION GAP SERPL CALC-SCNC: 9 MMOL/L — SIGNIFICANT CHANGE UP (ref 5–17)
BUN SERPL-MCNC: 17 MG/DL — SIGNIFICANT CHANGE UP (ref 7–23)
CALCIUM SERPL-MCNC: 9.3 MG/DL — SIGNIFICANT CHANGE UP (ref 8.4–10.5)
CHLORIDE SERPL-SCNC: 100 MMOL/L — SIGNIFICANT CHANGE UP (ref 96–108)
CO2 SERPL-SCNC: 29 MMOL/L — SIGNIFICANT CHANGE UP (ref 22–31)
CREAT SERPL-MCNC: 0.61 MG/DL — SIGNIFICANT CHANGE UP (ref 0.5–1.3)
GLUCOSE BLDC GLUCOMTR-MCNC: 139 MG/DL — HIGH (ref 70–99)
GLUCOSE BLDC GLUCOMTR-MCNC: 159 MG/DL — HIGH (ref 70–99)
GLUCOSE BLDC GLUCOMTR-MCNC: 165 MG/DL — HIGH (ref 70–99)
GLUCOSE BLDC GLUCOMTR-MCNC: 256 MG/DL — HIGH (ref 70–99)
GLUCOSE BLDC GLUCOMTR-MCNC: 394 MG/DL — HIGH (ref 70–99)
GLUCOSE SERPL-MCNC: 183 MG/DL — HIGH (ref 70–99)
HCT VFR BLD CALC: 40.6 % — SIGNIFICANT CHANGE UP (ref 39–50)
HGB BLD-MCNC: 12.7 G/DL — LOW (ref 13–17)
MCHC RBC-ENTMCNC: 27.5 PG — SIGNIFICANT CHANGE UP (ref 27–34)
MCHC RBC-ENTMCNC: 31.3 GM/DL — LOW (ref 32–36)
MCV RBC AUTO: 87.9 FL — SIGNIFICANT CHANGE UP (ref 80–100)
NRBC # BLD: 0 /100 WBCS — SIGNIFICANT CHANGE UP (ref 0–0)
PLATELET # BLD AUTO: 217 K/UL — SIGNIFICANT CHANGE UP (ref 150–400)
POTASSIUM SERPL-MCNC: 4.3 MMOL/L — SIGNIFICANT CHANGE UP (ref 3.5–5.3)
POTASSIUM SERPL-SCNC: 4.3 MMOL/L — SIGNIFICANT CHANGE UP (ref 3.5–5.3)
RBC # BLD: 4.62 M/UL — SIGNIFICANT CHANGE UP (ref 4.2–5.8)
RBC # FLD: 13.2 % — SIGNIFICANT CHANGE UP (ref 10.3–14.5)
SODIUM SERPL-SCNC: 138 MMOL/L — SIGNIFICANT CHANGE UP (ref 135–145)
WBC # BLD: 8.94 K/UL — SIGNIFICANT CHANGE UP (ref 3.8–10.5)
WBC # FLD AUTO: 8.94 K/UL — SIGNIFICANT CHANGE UP (ref 3.8–10.5)

## 2020-09-09 PROCEDURE — 93010 ELECTROCARDIOGRAM REPORT: CPT

## 2020-09-09 PROCEDURE — 99152 MOD SED SAME PHYS/QHP 5/>YRS: CPT

## 2020-09-09 PROCEDURE — 71250 CT THORAX DX C-: CPT | Mod: 26

## 2020-09-09 PROCEDURE — 93455 CORONARY ART/GRFT ANGIO S&I: CPT | Mod: 26

## 2020-09-09 RX ORDER — TRAMADOL HYDROCHLORIDE 50 MG/1
25 TABLET ORAL ONCE
Refills: 0 | Status: DISCONTINUED | OUTPATIENT
Start: 2020-09-09 | End: 2020-09-09

## 2020-09-09 RX ORDER — BUDESONIDE AND FORMOTEROL FUMARATE DIHYDRATE 160; 4.5 UG/1; UG/1
2 AEROSOL RESPIRATORY (INHALATION)
Refills: 0 | Status: DISCONTINUED | OUTPATIENT
Start: 2020-09-09 | End: 2020-09-11

## 2020-09-09 RX ORDER — INSULIN LISPRO 100/ML
22 VIAL (ML) SUBCUTANEOUS
Refills: 0 | Status: DISCONTINUED | OUTPATIENT
Start: 2020-09-09 | End: 2020-09-11

## 2020-09-09 RX ADMIN — Medication 16 UNIT(S): at 12:15

## 2020-09-09 RX ADMIN — Medication 20 MILLIGRAM(S): at 14:10

## 2020-09-09 RX ADMIN — NADOLOL 20 MILLIGRAM(S): 80 TABLET ORAL at 04:41

## 2020-09-09 RX ADMIN — Medication 3: at 18:33

## 2020-09-09 RX ADMIN — Medication 20 MILLIGRAM(S): at 21:06

## 2020-09-09 RX ADMIN — INSULIN GLARGINE 50 UNIT(S): 100 INJECTION, SOLUTION SUBCUTANEOUS at 21:24

## 2020-09-09 RX ADMIN — Medication 81 MILLIGRAM(S): at 14:25

## 2020-09-09 RX ADMIN — TRAMADOL HYDROCHLORIDE 25 MILLIGRAM(S): 50 TABLET ORAL at 04:41

## 2020-09-09 RX ADMIN — Medication 16 UNIT(S): at 18:34

## 2020-09-09 RX ADMIN — Medication 3: at 21:24

## 2020-09-09 RX ADMIN — LISINOPRIL 10 MILLIGRAM(S): 2.5 TABLET ORAL at 04:41

## 2020-09-09 RX ADMIN — BUDESONIDE AND FORMOTEROL FUMARATE DIHYDRATE 2 PUFF(S): 160; 4.5 AEROSOL RESPIRATORY (INHALATION) at 18:34

## 2020-09-09 RX ADMIN — ENOXAPARIN SODIUM 40 MILLIGRAM(S): 100 INJECTION SUBCUTANEOUS at 14:11

## 2020-09-09 RX ADMIN — Medication 20 MILLIGRAM(S): at 04:41

## 2020-09-09 RX ADMIN — ATORVASTATIN CALCIUM 40 MILLIGRAM(S): 80 TABLET, FILM COATED ORAL at 21:06

## 2020-09-09 NOTE — PROGRESS NOTE ADULT - SUBJECTIVE AND OBJECTIVE BOX
Subjective: Patient seen and examined. No new events except as noted.     SUBJECTIVE/ROS:  still with sob   difficulty sleeping       MEDICATIONS:  MEDICATIONS  (STANDING):  aspirin enteric coated 81 milliGRAM(s) Oral daily  atorvastatin 40 milliGRAM(s) Oral at bedtime  dextrose 5%. 1000 milliLiter(s) (50 mL/Hr) IV Continuous <Continuous>  dextrose 50% Injectable 12.5 Gram(s) IV Push once  dextrose 50% Injectable 25 Gram(s) IV Push once  dextrose 50% Injectable 25 Gram(s) IV Push once  enoxaparin Injectable 40 milliGRAM(s) SubCutaneous daily  furosemide    Tablet 20 milliGRAM(s) Oral daily  influenza   Vaccine 0.5 milliLiter(s) IntraMuscular once  insulin glargine Injectable (LANTUS) 50 Unit(s) SubCutaneous at bedtime  insulin lispro (HumaLOG) corrective regimen sliding scale   SubCutaneous three times a day before meals  insulin lispro (HumaLOG) corrective regimen sliding scale   SubCutaneous at bedtime  insulin lispro Injectable (HumaLOG) 16 Unit(s) SubCutaneous three times a day before meals  lisinopril 10 milliGRAM(s) Oral daily  nadolol 20 milliGRAM(s) Oral daily      PHYSICAL EXAM:  T(C): 36.4 (09-09-20 @ 04:10), Max: 36.9 (09-08-20 @ 19:48)  HR: 61 (09-09-20 @ 04:10) (61 - 82)  BP: 112/73 (09-09-20 @ 04:10) (109/65 - 126/71)  RR: 18 (09-09-20 @ 04:10) (18 - 18)  SpO2: 96% (09-09-20 @ 04:10) (96% - 96%)  Wt(kg): --  I&O's Summary    07 Sep 2020 07:01  -  08 Sep 2020 07:00  --------------------------------------------------------  IN: 1020 mL / OUT: 1050 mL / NET: -30 mL    08 Sep 2020 07:01  -  09 Sep 2020 06:59  --------------------------------------------------------  IN: 480 mL / OUT: 600 mL / NET: -120 mL            JVP: Normal  Neck: supple  Lung: clear   CV: S1 S2 , Murmur:  Abd: soft  Ext: No edema  neuro: Awake / alert  Psych: flat affect  Skin: normal``    LABS/DATA:    CARDIAC MARKERS:  CARDIAC MARKERS ( 07 Sep 2020 14:49 )  x     / x     / 77 U/L / x     / 4.1 ng/mL                                12.7   8.94  )-----------( 217      ( 09 Sep 2020 05:42 )             40.6     09-09    138  |  100  |  17  ----------------------------<  183<H>  4.3   |  29  |  0.61    Ca    9.3      09 Sep 2020 05:42      proBNP:   Lipid Profile:   HgA1c:   TSH:     TELE:  EKG:      < from: TTE with Doppler (w/Cont) (09.08.20 @ 10:13) >  Conclusions:  Technically difficult study.  1. Normal mitral valve. Minimal mitral regurgitation.  2. Aortic valve not well visualized; valve leaflets appear  to open normally. No aortic valve regurgitation seen.  3. Mild aortic root dilatation  (Ao: 4.3 cm at the sinuses  of Valsalva).  4. Endocardial visualization enhanced with intravenous  injection of Ultrasonic Enhancing Agent (Definity).  Hyperdynamic left ventricular systolic function.  5. The right ventricle is not well visualized; grossly  normal right ventricular systolic function. Unable to  assess right ventricular size due to off-axis imaging.  *** No previous Echo exam.  ------------------------------------------------------------------------  Confirmed on  9/8/2020 - 12:42:10 Edis Palma M.D.  ------------------------------------------------------------------------    < end of copied text >

## 2020-09-09 NOTE — PROGRESS NOTE ADULT - ASSESSMENT
64M with PMH of CAD s/p CABG (2014), HTN, T2DM on insulin p/w months of progressive tingling CP and TRIPATHI, c/f cardiac ischemia, admitted for further work-up.     Problem/Plan - 1:  ·  Problem: Chest pain.  Plan: telemonitor   - cards fu   - ischemia lópez as per cards   - cath today   Problem/Plan - 2:  ·  Problem: Dyspnea on exertion.  Plan: pulmonary fu   -c/w nebs     Problem/Plan - 3:  ·  Problem: Type 2 diabetes mellitus without complication, with long-term current use of insulin.  Plan: basal, bolus   monitor FS    Problem/Plan - 4:  ·  Problem: Essential hypertension.  Plan: BP stable  c/w home medications   monitor routinely.     Problem/Plan - 5:  ·  Problem: CAD (coronary artery disease).  Plan: s/p CABG 2014  c/w ASA and stain   CP work-up as above.     Problem/Plan - 6:  Problem: Current smoker. Plan: 30 pack year smoking hx, currently smoking 3-4 cigarettes per day  smoking cessation

## 2020-09-09 NOTE — PROGRESS NOTE ADULT - SUBJECTIVE AND OBJECTIVE BOX
Patient is a 64y old  Male who presents with a chief complaint of CP, TRIPATHI (09 Sep 2020 11:15)      INTERVAL HPI/OVERNIGHT EVENTS:  T(C): 36.6 (09-09-20 @ 12:00), Max: 36.9 (09-08-20 @ 19:48)  HR: 78 (09-09-20 @ 13:30) (59 - 82)  BP: 122/72 (09-09-20 @ 13:30) (97/59 - 136/79)  RR: 16 (09-09-20 @ 13:30) (16 - 20)  SpO2: 97% (09-09-20 @ 12:00) (93% - 100%)  Wt(kg): --  I&O's Summary    08 Sep 2020 07:01  -  09 Sep 2020 07:00  --------------------------------------------------------  IN: 480 mL / OUT: 600 mL / NET: -120 mL    09 Sep 2020 07:01  -  09 Sep 2020 16:23  --------------------------------------------------------  IN: 360 mL / OUT: 600 mL / NET: -240 mL        LABS:                        12.7   8.94  )-----------( 217      ( 09 Sep 2020 05:42 )             40.6     09-09    138  |  100  |  17  ----------------------------<  183<H>  4.3   |  29  |  0.61    Ca    9.3      09 Sep 2020 05:42          CAPILLARY BLOOD GLUCOSE      POCT Blood Glucose.: 139 mg/dL (09 Sep 2020 12:13)  POCT Blood Glucose.: 159 mg/dL (09 Sep 2020 09:40)  POCT Blood Glucose.: 165 mg/dL (09 Sep 2020 07:21)  POCT Blood Glucose.: 185 mg/dL (08 Sep 2020 21:17)  POCT Blood Glucose.: 104 mg/dL (08 Sep 2020 17:04)            MEDICATIONS  (STANDING):  aspirin enteric coated 81 milliGRAM(s) Oral daily  atorvastatin 40 milliGRAM(s) Oral at bedtime  budesonide 160 MICROgram(s)/formoterol 4.5 MICROgram(s) Inhaler 2 Puff(s) Inhalation two times a day  dextrose 5%. 1000 milliLiter(s) (50 mL/Hr) IV Continuous <Continuous>  dextrose 50% Injectable 12.5 Gram(s) IV Push once  dextrose 50% Injectable 25 Gram(s) IV Push once  dextrose 50% Injectable 25 Gram(s) IV Push once  enoxaparin Injectable 40 milliGRAM(s) SubCutaneous daily  furosemide    Tablet 20 milliGRAM(s) Oral daily  influenza   Vaccine 0.5 milliLiter(s) IntraMuscular once  insulin glargine Injectable (LANTUS) 50 Unit(s) SubCutaneous at bedtime  insulin lispro (HumaLOG) corrective regimen sliding scale   SubCutaneous three times a day before meals  insulin lispro (HumaLOG) corrective regimen sliding scale   SubCutaneous at bedtime  insulin lispro Injectable (HumaLOG) 16 Unit(s) SubCutaneous three times a day before meals  lisinopril 10 milliGRAM(s) Oral daily  methylPREDNISolone sodium succinate Injectable 20 milliGRAM(s) IV Push every 6 hours  nadolol 20 milliGRAM(s) Oral daily    MEDICATIONS  (PRN):  acetaminophen   Tablet .. 650 milliGRAM(s) Oral every 6 hours PRN Temp greater or equal to 38C (100.4F), Moderate Pain (4 - 6)  ALBUTerol    90 MICROgram(s) HFA Inhaler 2 Puff(s) Inhalation every 6 hours PRN Shortness of Breath and/or Wheezing  dextrose 40% Gel 15 Gram(s) Oral once PRN Blood Glucose LESS THAN 70 milliGRAM(s)/deciliter  glucagon  Injectable 1 milliGRAM(s) IntraMuscular once PRN Glucose LESS THAN 70 milligrams/deciliter          PHYSICAL EXAM:  GENERAL: NAD, well-groomed, well-developed  HEAD:  Atraumatic, Normocephalic  CHEST/LUNG: Clear to percussion bilaterally; No rales, rhonchi, wheezing, or rubs  HEART: Regular rate and rhythm; No murmurs, rubs, or gallops  ABDOMEN: Soft, Nontender, Nondistended; Bowel sounds present  EXTREMITIES:  2+ Peripheral Pulses, No clubbing, cyanosis, or edema  LYMPH: No lymphadenopathy noted  SKIN: No rashes or lesions    Care Discussed with Consultants/Other Providers [ ] YES  [ ] NO

## 2020-09-09 NOTE — PROGRESS NOTE ADULT - ASSESSMENT
SOB   likely multifactorial   symptoms behave like acute volume overload however, chest xray clear, proBNP is very low   has evidence of co2 retaining   may need inhaled steroids vs steroids  echo unremarkable   stress test cancelled yesterday due to wheezing   will plan for cath today   pulm eval     CAD s/p cabg   cont asa, statin   plan as above    COPD exacerbation   plan as above  pulm eval     HTN  stable     DMII  check hga1c  can use endo eval   Monitor finger stick. Insulin coverage.    Tachycardia   cont nadolol

## 2020-09-09 NOTE — CONSULT NOTE ADULT - SUBJECTIVE AND OBJECTIVE BOX
Patient is a 64y old  Male who presents with a chief complaint of CP, TRIPATHI (09 Sep 2020 06:59)      HPI:  64M with PMH of CAD s/p CABG (2014), HTN, T2DM on insulin p/w months of tingling CP and TRIPATHI. Pt states has been experiencing L sided tingling CP, non radiating, moderate intensity, worse with exertion and improved with rest, associated with TRIPATHI, palpitations and diaphoresis; no associated nausea/vomiting or lightheadedness, no numbness/tingling. This has been ongoing x months, progressively getting worse over that time. Also endorsing orthopnea, states he feels like he is drowning if he lays flat and has been forced to sleep upright; has chronic cough associated with hx of smoking, not worse than usual. Endorses intermittent b/l dependent LE swelling that improves with elevation. Does endorse he was diagnosed with SHAYY but has not been compliant with CPAP; may have also been diagnosed with emphysema, but pt unclear about this history.   Denies all other ROS. (06 Sep 2020 21:09)     pt uses albuterol daily for past 5 years: He has been an active smoker and s smoking 5-6 cigs a day for past many years: previously he used to smoke more:   he has SHAYY too but nver realy used cpap:  he came in here with sob and wheezing:       ?FOLLOWING PRESENT  [ x] Hx of PE/DVT, [ likely] Hx COPD, [ xHx of Asthma, [ ]x Hx of Hospitalization, [ ] x Hx of BiPAP/CPAP use, [y ] Hx of SHAYY    Allergies    No Known Allergies    Intolerances        PAST MEDICAL & SURGICAL HISTORY:  Diabetic autonomic neuropathy associated with type 2 diabetes mellitus  CAD (coronary artery disease)  HTN (hypertension)  DM2 (diabetes mellitus, type 2)  COPD (chronic obstructive pulmonary disease)  S/P CABG x 2: Dr Ashley  H/O umbilical hernia repair: x2      FAMILY HISTORY:  Family history of diabetes mellitus      Social History: [?> 40 yers: activfe  ] TOBACCO                  [ x ] ETOH                                 [  x] IVDA/DRUGS    REVIEW OF SYSTEMS      General:	x    Skin/Breast:x  	  Ophthalmologic:  	x  ENMT:	x    Respiratory and Thorax: sob, wheezing  	  Cardiovascular:	x    Gastrointestinal:	x    Genitourinary:	x    Musculoskeletal:	x    Neurological:	x    Psychiatric:	x    Hematology/Lymphatics:	x    Endocrine:x	    Allergic/Immunologic:	x    MEDICATIONS  (STANDING):  aspirin enteric coated 81 milliGRAM(s) Oral daily  atorvastatin 40 milliGRAM(s) Oral at bedtime  dextrose 5%. 1000 milliLiter(s) (50 mL/Hr) IV Continuous <Continuous>  dextrose 50% Injectable 12.5 Gram(s) IV Push once  dextrose 50% Injectable 25 Gram(s) IV Push once  dextrose 50% Injectable 25 Gram(s) IV Push once  enoxaparin Injectable 40 milliGRAM(s) SubCutaneous daily  furosemide    Tablet 20 milliGRAM(s) Oral daily  influenza   Vaccine 0.5 milliLiter(s) IntraMuscular once  insulin glargine Injectable (LANTUS) 50 Unit(s) SubCutaneous at bedtime  insulin lispro (HumaLOG) corrective regimen sliding scale   SubCutaneous three times a day before meals  insulin lispro (HumaLOG) corrective regimen sliding scale   SubCutaneous at bedtime  insulin lispro Injectable (HumaLOG) 16 Unit(s) SubCutaneous three times a day before meals  lisinopril 10 milliGRAM(s) Oral daily  nadolol 20 milliGRAM(s) Oral daily    MEDICATIONS  (PRN):  acetaminophen   Tablet .. 650 milliGRAM(s) Oral every 6 hours PRN Temp greater or equal to 38C (100.4F), Moderate Pain (4 - 6)  ALBUTerol    90 MICROgram(s) HFA Inhaler 2 Puff(s) Inhalation every 6 hours PRN Shortness of Breath and/or Wheezing  dextrose 40% Gel 15 Gram(s) Oral once PRN Blood Glucose LESS THAN 70 milliGRAM(s)/deciliter  glucagon  Injectable 1 milliGRAM(s) IntraMuscular once PRN Glucose LESS THAN 70 milligrams/deciliter       Vital Signs Last 24 Hrs  T(C): 36.6 (09 Sep 2020 09:40), Max: 36.9 (08 Sep 2020 19:48)  T(F): 97.8 (09 Sep 2020 09:40), Max: 98.4 (08 Sep 2020 19:48)  HR: 66 (09 Sep 2020 10:40) (61 - 82)  BP: 100/55 (09 Sep 2020 10:40) (100/55 - 136/79)  BP(mean): --  RR: 17 (09 Sep 2020 10:40) (17 - 20)  SpO2: 95% (09 Sep 2020 10:40) (93% - 100%)        I&O's Summary    08 Sep 2020 07:01  -  09 Sep 2020 07:00  --------------------------------------------------------  IN: 480 mL / OUT: 600 mL / NET: -120 mL    09 Sep 2020 07:01  -  09 Sep 2020 11:16  --------------------------------------------------------  IN: 0 mL / OUT: 300 mL / NET: -300 mL        Physical Exam:   GENERAL: Obese+  HEENT: JOSÉ LUIS/   Atraumatic, Normocephalic  ENMT: No tonsillar erythema, exudates, or enlargement; Moist mucous membranes, Good dentition, No lesions  NECK: Supple, No JVD, Normal thyroid  CHEST/LUNG: wheezing ++  CVS: Regular rate and rhythm; No murmurs, rubs, or gallops  GI: : Soft, Nontender, Nondistended; Bowel sounds present  NERVOUS SYSTEM:  Alert & Oriented X3  EXTREMITIES:  +edema  LYMPH: No lymphadenopathy noted  SKIN: No rashes or lesions  ENDOCRINOLOGY: No Thyromegaly  PSYCH: Appropriate    Labs:  Venous<70<4>>28<<7.315>>Venous<<3><<4><<5<<289>>  CARDIAC MARKERS ( 07 Sep 2020 14:49 )  x     / x     / 77 U/L / x     / 4.1 ng/mL                            12.7   8.94  )-----------( 217      ( 09 Sep 2020 05:42 )             40.6                         12.6   8.74  )-----------( 232      ( 07 Sep 2020 05:37 )             40.9                         13.4   8.70  )-----------( 261      ( 06 Sep 2020 18:30 )             41.8     09-09    138  |  100  |  17  ----------------------------<  183<H>  4.3   |  29  |  0.61  09-07    141  |  102  |  18  ----------------------------<  192<H>  3.9   |  28  |  0.67  09-06    143  |  103  |  19  ----------------------------<  122<H>  4.1   |  30  |  0.67    Ca    9.3      09 Sep 2020 05:42    TPro  7.4  /  Alb  4.2  /  TBili  0.2  /  DBili  x   /  AST  17  /  ALT  19  /  AlkPhos  86  09-06    CAPILLARY BLOOD GLUCOSE      POCT Blood Glucose.: 159 mg/dL (09 Sep 2020 09:40)  POCT Blood Glucose.: 165 mg/dL (09 Sep 2020 07:21)  POCT Blood Glucose.: 185 mg/dL (08 Sep 2020 21:17)  POCT Blood Glucose.: 104 mg/dL (08 Sep 2020 17:04)  POCT Blood Glucose.: 154 mg/dL (08 Sep 2020 12:15)          D DImer  Serum Pro-Brain Natriuretic Peptide: 42 pg/mL (09-06 @ 18:30)      Studies  Chest X-RAY  CT SCAN Chest   CT Abdomen  Venous Dopplers: LE:   Others      < from: Xray Chest 2 Views PA/Lat (09.06.20 @ 18:21) >        INTERPRETATION:  CLINICAL INFORMATION: 64-year-old male, chest pain.    TECHNIQUE: PA and lateral views of the chest.    COMPARISON: Prior chest radiograph dated 2/1/2019.    FINDINGS:    Sternotomy wires.  Clear lungs. No pleural effusion or pneumothorax. Cardiac silhouette is within normal limits.    IMPRESSION:    Clear lungs.    < from: CT Chest No Cont (04.28.18 @ 10:48) >  EXAM:  CT CHEST        PROCEDURE DATE:  Apr 28 2018         INTERPRETATION:  CLINICAL INFORMATION: Shortness of breath    COMPARISON: Chest CT dated 4/18/2016    PROCEDURE:   CT of the Chest was performed without intravenous contrast.  Sagittal andcoronal reformats were performed.    FINDINGS:    CHEST:     LUNGS AND LARGE AIRWAYS: Patent central airways. No pulmonary nodules or   parenchymal consolidation.  PLEURA: No pleural effusion.  VESSELS: Thoracic aortic and coronary arteriosclerosis. Status post CABG.  HEART: Heart size is normal. No pericardial effusion.  MEDIASTINUM AND BETTE: No lymphadenopathy.  CHEST WALL AND LOWER NECK: Within normal limits.  VISUALIZED UPPER ABDOMEN: Hepatic steatosis. Small right renal cyst.  BONES: Status post sternotomy. Multilevel degenerative disease of the   spine.    IMPRESSION: No acute pathology.                  SHELBY RICHARDS M.D., RADIOLOGY RESIDENT  This document has been electronically signed.  MESHA LAWSON M.D., ATTENDING RADIOLOGIST  This document has been electronically signed. Apr 28 2018 11:01AM              < end of copied text >            ELIZABETH CORDOVA M.D., RADIOLOGY RESIDENT  This document has been electronically signed.  BRAXTON EAST M.D.,ATTENDING RADIOLOGIST  This document has been electronically signed. Sep  6 2020  9:38PM        < end of copied text >

## 2020-09-09 NOTE — CONSULT NOTE ADULT - ASSESSMENT
64M with PMH of CAD s/p CABG (2014), HTN, T2DM on insulin p/w months of progressive tingling CP and TRIPATHI, c/f cardiac ischemia, admitted for further work-up.     Likely copd: active smoker: wheezing: unable to sleep:  CAD:  s/p cath: patent grafts  htn  dm  cabg: cad    He has been wheezing actively and is u nable to sleep:  he would need steroids for some time: start symbicort:  he was advised last time on admission too, that he shold cpap but he is still not using it:  His room air oxygenation is fine:  HIS GRAFTS ARE PATENT: S/P CATH:  his blood glcuse needs to be monitored closely on steroids:  He has been advised strongly to stop smoking:  Hs last ct scan chest from 2018 was negative:  since he is an active smoker: high chances of malignancy: he would need repeat ct scan chest   DW team

## 2020-09-10 LAB
GLUCOSE BLDC GLUCOMTR-MCNC: 244 MG/DL — HIGH (ref 70–99)
GLUCOSE BLDC GLUCOMTR-MCNC: 278 MG/DL — HIGH (ref 70–99)
GLUCOSE BLDC GLUCOMTR-MCNC: 339 MG/DL — HIGH (ref 70–99)
GLUCOSE BLDC GLUCOMTR-MCNC: 371 MG/DL — HIGH (ref 70–99)

## 2020-09-10 RX ORDER — AZITHROMYCIN 500 MG/1
500 TABLET, FILM COATED ORAL DAILY
Refills: 0 | Status: DISCONTINUED | OUTPATIENT
Start: 2020-09-10 | End: 2020-09-11

## 2020-09-10 RX ORDER — INSULIN GLARGINE 100 [IU]/ML
60 INJECTION, SOLUTION SUBCUTANEOUS AT BEDTIME
Refills: 0 | Status: DISCONTINUED | OUTPATIENT
Start: 2020-09-10 | End: 2020-09-11

## 2020-09-10 RX ADMIN — Medication 22 UNIT(S): at 17:02

## 2020-09-10 RX ADMIN — Medication 22 UNIT(S): at 08:21

## 2020-09-10 RX ADMIN — Medication 20 MILLIGRAM(S): at 05:27

## 2020-09-10 RX ADMIN — Medication 20 MILLIGRAM(S): at 01:24

## 2020-09-10 RX ADMIN — NADOLOL 20 MILLIGRAM(S): 80 TABLET ORAL at 05:26

## 2020-09-10 RX ADMIN — Medication 2: at 17:02

## 2020-09-10 RX ADMIN — Medication 20 MILLIGRAM(S): at 23:00

## 2020-09-10 RX ADMIN — Medication 4: at 12:27

## 2020-09-10 RX ADMIN — Medication 3: at 21:57

## 2020-09-10 RX ADMIN — BUDESONIDE AND FORMOTEROL FUMARATE DIHYDRATE 2 PUFF(S): 160; 4.5 AEROSOL RESPIRATORY (INHALATION) at 18:13

## 2020-09-10 RX ADMIN — Medication 81 MILLIGRAM(S): at 12:28

## 2020-09-10 RX ADMIN — AZITHROMYCIN 500 MILLIGRAM(S): 500 TABLET, FILM COATED ORAL at 17:02

## 2020-09-10 RX ADMIN — Medication 20 MILLIGRAM(S): at 17:02

## 2020-09-10 RX ADMIN — Medication 3: at 08:22

## 2020-09-10 RX ADMIN — LISINOPRIL 10 MILLIGRAM(S): 2.5 TABLET ORAL at 05:27

## 2020-09-10 RX ADMIN — ENOXAPARIN SODIUM 40 MILLIGRAM(S): 100 INJECTION SUBCUTANEOUS at 12:27

## 2020-09-10 RX ADMIN — INSULIN GLARGINE 60 UNIT(S): 100 INJECTION, SOLUTION SUBCUTANEOUS at 21:58

## 2020-09-10 RX ADMIN — ATORVASTATIN CALCIUM 40 MILLIGRAM(S): 80 TABLET, FILM COATED ORAL at 21:20

## 2020-09-10 RX ADMIN — Medication 22 UNIT(S): at 12:27

## 2020-09-10 RX ADMIN — BUDESONIDE AND FORMOTEROL FUMARATE DIHYDRATE 2 PUFF(S): 160; 4.5 AEROSOL RESPIRATORY (INHALATION) at 05:27

## 2020-09-10 RX ADMIN — Medication 20 MILLIGRAM(S): at 12:27

## 2020-09-10 NOTE — DIETITIAN INITIAL EVALUATION ADULT. - PHYSICAL APPEARANCE
obese/other (specify) Ht: 5'11" Wt: 276.2 pounds BMI: 38.5 kg/m2 IBW: 172 pounds (+/-10%) 161%IBW  Edema: 1+ R+L ankle  Skin per nursing documentation: no pressure injuries noted

## 2020-09-10 NOTE — PROGRESS NOTE ADULT - SUBJECTIVE AND OBJECTIVE BOX
Patient is a 64y old  Male who presents with a chief complaint of CP, TRIPATHI (10 Sep 2020 06:53)      Any change in ROS: He is doing much better  he wa able to sleep : but says his breathing has imrpoved quite a bit     MEDICATIONS  (STANDING):  aspirin enteric coated 81 milliGRAM(s) Oral daily  atorvastatin 40 milliGRAM(s) Oral at bedtime  budesonide 160 MICROgram(s)/formoterol 4.5 MICROgram(s) Inhaler 2 Puff(s) Inhalation two times a day  dextrose 5%. 1000 milliLiter(s) (50 mL/Hr) IV Continuous <Continuous>  dextrose 50% Injectable 12.5 Gram(s) IV Push once  dextrose 50% Injectable 25 Gram(s) IV Push once  dextrose 50% Injectable 25 Gram(s) IV Push once  enoxaparin Injectable 40 milliGRAM(s) SubCutaneous daily  furosemide    Tablet 20 milliGRAM(s) Oral daily  influenza   Vaccine 0.5 milliLiter(s) IntraMuscular once  insulin glargine Injectable (LANTUS) 60 Unit(s) SubCutaneous at bedtime  insulin lispro (HumaLOG) corrective regimen sliding scale   SubCutaneous three times a day before meals  insulin lispro (HumaLOG) corrective regimen sliding scale   SubCutaneous at bedtime  insulin lispro Injectable (HumaLOG) 22 Unit(s) SubCutaneous three times a day before meals  lisinopril 10 milliGRAM(s) Oral daily  methylPREDNISolone sodium succinate Injectable 20 milliGRAM(s) IV Push every 6 hours  nadolol 20 milliGRAM(s) Oral daily    MEDICATIONS  (PRN):  acetaminophen   Tablet .. 650 milliGRAM(s) Oral every 6 hours PRN Temp greater or equal to 38C (100.4F), Moderate Pain (4 - 6)  ALBUTerol    90 MICROgram(s) HFA Inhaler 2 Puff(s) Inhalation every 6 hours PRN Shortness of Breath and/or Wheezing  dextrose 40% Gel 15 Gram(s) Oral once PRN Blood Glucose LESS THAN 70 milliGRAM(s)/deciliter  glucagon  Injectable 1 milliGRAM(s) IntraMuscular once PRN Glucose LESS THAN 70 milligrams/deciliter    Vital Signs Last 24 Hrs  T(C): 36.8 (10 Sep 2020 05:00), Max: 36.8 (10 Sep 2020 05:00)  T(F): 98.2 (10 Sep 2020 05:00), Max: 98.2 (10 Sep 2020 05:00)  HR: 85 (10 Sep 2020 05:00) (70 - 89)  BP: 120/68 (10 Sep 2020 05:00) (117/76 - 134/84)  BP(mean): --  RR: 18 (10 Sep 2020 05:00) (16 - 18)  SpO2: 95% (10 Sep 2020 05:00) (93% - 97%)    I&O's Summary    09 Sep 2020 07:01  -  10 Sep 2020 07:00  --------------------------------------------------------  IN: 360 mL / OUT: 600 mL / NET: -240 mL          Physical Exam:   GENERAL: NAD, well-groomed, well-developed  HEENT: JOSÉ LUIS/   Atraumatic, Normocephalic  ENMT: No tonsillar erythema, exudates, or enlargement; Moist mucous membranes, Good dentition, No lesions  NECK: Supple, No JVD, Normal thyroid  CHEST/LUNG: wheezing mild   CVS: Regular rate and rhythm; No murmurs, rubs, or gallops  GI: : Soft, Nontender, Nondistended; Bowel sounds present  NERVOUS SYSTEM:  Alert & Oriented X3  EXTREMITIES: - edema  LYMPH: No lymphadenopathy noted  SKIN: No rashes or lesions  ENDOCRINOLOGY: No Thyromegaly  PSYCH: Appropriate    Labs:  34                            12.7   8.94  )-----------( 217      ( 09 Sep 2020 05:42 )             40.6                         12.6   8.74  )-----------( 232      ( 07 Sep 2020 05:37 )             40.9                         13.4   8.70  )-----------( 261      ( 06 Sep 2020 18:30 )             41.8     09-09    138  |  100  |  17  ----------------------------<  183<H>  4.3   |  29  |  0.61  09-07    141  |  102  |  18  ----------------------------<  192<H>  3.9   |  28  |  0.67  09-06    143  |  103  |  19  ----------------------------<  122<H>  4.1   |  30  |  0.67    Ca    9.3      09 Sep 2020 05:42    TPro  7.4  /  Alb  4.2  /  TBili  0.2  /  DBili  x   /  AST  17  /  ALT  19  /  AlkPhos  86  09-06    CAPILLARY BLOOD GLUCOSE      POCT Blood Glucose.: 278 mg/dL (10 Sep 2020 08:16)  POCT Blood Glucose.: 394 mg/dL (09 Sep 2020 21:14)  POCT Blood Glucose.: 256 mg/dL (09 Sep 2020 18:22)  POCT Blood Glucose.: 139 mg/dL (09 Sep 2020 12:13)          < from: CT Chest No Cont (09.09.20 @ 17:42) >    PROCEDURE DATE:  09/09/2020            INTERPRETATION:  CLINICAL INFORMATION: Shortness of breath, rule out pulmonary embolus    COMPARISON: 4/20/2018    PROCEDURE:  CT of the Chest was performed without intravenous contrast.  Sagittal and coronal reformats were performed.      FINDINGS:    AIRWAYS, LUNGS and PLEURA: Patent central airways. Bronchial wall thickening. Mucoid impactions and tree-in-bud opacities within the right lower lobe likely representing bronchiolitis. No consolidation.    No pleural effusion.    MEDIASTINUM AND BETTE: No lymphadenopathy.    HEART AND VESSELS: Heart size is normal. No pericardial effusion. Pulmonary embolus is not evaluated secondary to lack of IV contrast. Sternotomy and CABG. Thoracic aorta normal in caliber.    VISUALIZED UPPER ABDOMEN: Hepatic steatosis.    CHEST WALL AND BONES: No aggressive osseous lesion.    LOWER NECK: Within normal limits.    IMPRESSION:    Pulmonary embolus is not evaluated secondary to lack of IV contrast.    Bronchitis and bronchiolitis.                  BRAXTON EAST M.D.,ATTENDING RADIOLOGIST  This document has been electronically signed. Sep  9 2020  6:15PM        < end of copied text >          RECENT CULTURES:        RESPIRATORY CULTURES:          Studies  Chest X-RAY  CT SCAN Chest   Venous Dopplers: LE:   CT Abdomen  Others

## 2020-09-10 NOTE — PROGRESS NOTE ADULT - ASSESSMENT
64M with PMH of CAD s/p CABG (2014), HTN, T2DM on insulin p/w months of progressive tingling CP and TRIPATHI, c/f cardiac ischemia, admitted for further work-up.     Likely copd: active smoker: wheezing: unable to sleep:  CAD:  s/p cath: patent grafts  htn  dm  cabg: cad    He has been wheezing actively and is u nable to sleep:  he would need steroids for some time: start symbicort:  he was advised last time on admission too, that he shold cpap but he is still not using it:  His room air oxygenation is fine:  HIS GRAFTS ARE PATENT: S/P CATH:  his blood glcuse needs to be monitored closely on steroids:  He has been advised strongly to stop smoking:  Hs last ct scan chest from 2018 was negative:  since he is an active smoker: high chances of malignancy: he would need repeat ct scan chest   DW team     9/10:  he feels much better:  ct scan chest noted:  has bronchitis and bronchiolitis   Cont steroids for now: change to po in am: adv to stop smoking: he has SHAYY too: he needs to go back to his [facility] to get the machine again:  cont cardiac care:  need to be checked for home oxygen requirement:  FREDRICK NP

## 2020-09-10 NOTE — PROGRESS NOTE ADULT - SUBJECTIVE AND OBJECTIVE BOX
Patient is a 64y old  Male who presents with a chief complaint of CP, TRIPATHI (10 Sep 2020 11:47)      INTERVAL HPI/OVERNIGHT EVENTS:  T(C): 36.6 (09-10-20 @ 13:03), Max: 36.8 (09-10-20 @ 05:00)  HR: 93 (09-10-20 @ 13:03) (85 - 93)  BP: 130/77 (09-10-20 @ 13:03) (120/68 - 134/74)  RR: 16 (09-10-20 @ 13:03) (16 - 18)  SpO2: 95% (09-10-20 @ 13:03) (93% - 95%)  Wt(kg): --  I&O's Summary    09 Sep 2020 07:01  -  10 Sep 2020 07:00  --------------------------------------------------------  IN: 360 mL / OUT: 600 mL / NET: -240 mL    10 Sep 2020 07:01  -  10 Sep 2020 15:51  --------------------------------------------------------  IN: 640 mL / OUT: 0 mL / NET: 640 mL        LABS:                        12.7   8.94  )-----------( 217      ( 09 Sep 2020 05:42 )             40.6     09-09    138  |  100  |  17  ----------------------------<  183<H>  4.3   |  29  |  0.61    Ca    9.3      09 Sep 2020 05:42          CAPILLARY BLOOD GLUCOSE      POCT Blood Glucose.: 339 mg/dL (10 Sep 2020 12:14)  POCT Blood Glucose.: 278 mg/dL (10 Sep 2020 08:16)  POCT Blood Glucose.: 394 mg/dL (09 Sep 2020 21:14)  POCT Blood Glucose.: 256 mg/dL (09 Sep 2020 18:22)            MEDICATIONS  (STANDING):  aspirin enteric coated 81 milliGRAM(s) Oral daily  atorvastatin 40 milliGRAM(s) Oral at bedtime  azithromycin   Tablet 500 milliGRAM(s) Oral daily  budesonide 160 MICROgram(s)/formoterol 4.5 MICROgram(s) Inhaler 2 Puff(s) Inhalation two times a day  dextrose 5%. 1000 milliLiter(s) (50 mL/Hr) IV Continuous <Continuous>  dextrose 50% Injectable 12.5 Gram(s) IV Push once  dextrose 50% Injectable 25 Gram(s) IV Push once  dextrose 50% Injectable 25 Gram(s) IV Push once  enoxaparin Injectable 40 milliGRAM(s) SubCutaneous daily  furosemide    Tablet 20 milliGRAM(s) Oral daily  influenza   Vaccine 0.5 milliLiter(s) IntraMuscular once  insulin glargine Injectable (LANTUS) 60 Unit(s) SubCutaneous at bedtime  insulin lispro (HumaLOG) corrective regimen sliding scale   SubCutaneous three times a day before meals  insulin lispro (HumaLOG) corrective regimen sliding scale   SubCutaneous at bedtime  insulin lispro Injectable (HumaLOG) 22 Unit(s) SubCutaneous three times a day before meals  lisinopril 10 milliGRAM(s) Oral daily  methylPREDNISolone sodium succinate Injectable 20 milliGRAM(s) IV Push every 6 hours  nadolol 20 milliGRAM(s) Oral daily    MEDICATIONS  (PRN):  acetaminophen   Tablet .. 650 milliGRAM(s) Oral every 6 hours PRN Temp greater or equal to 38C (100.4F), Moderate Pain (4 - 6)  ALBUTerol    90 MICROgram(s) HFA Inhaler 2 Puff(s) Inhalation every 6 hours PRN Shortness of Breath and/or Wheezing  dextrose 40% Gel 15 Gram(s) Oral once PRN Blood Glucose LESS THAN 70 milliGRAM(s)/deciliter  glucagon  Injectable 1 milliGRAM(s) IntraMuscular once PRN Glucose LESS THAN 70 milligrams/deciliter          PHYSICAL EXAM:  GENERAL: NAD, well-groomed, well-developed  HEAD:  Atraumatic, Normocephalic  CHEST/LUNG: Clear to percussion bilaterally; No rales, rhonchi, wheezing, or rubs  HEART: Regular rate and rhythm; No murmurs, rubs, or gallops  ABDOMEN: Soft, Nontender, Nondistended; Bowel sounds present  EXTREMITIES:  2+ Peripheral Pulses, No clubbing, cyanosis, or edema  LYMPH: No lymphadenopathy noted  SKIN: No rashes or lesions    Care Discussed with Consultants/Other Providers [ ] YES  [ ] NO

## 2020-09-10 NOTE — PROGRESS NOTE ADULT - ASSESSMENT
64M with PMH of CAD s/p CABG (2014), HTN, T2DM on insulin p/w months of progressive tingling CP and TRIPATHI, c/f cardiac ischemia, admitted for further work-up.     Problem/Plan - 1:  ·  Problem: Chest pain.  Plan: telemonitor   - cards fu   - ischemia lópez as per cards   - sp cath    Problem/Plan - 2:  ·  Problem: Dyspnea on exertion.  Plan: pulmonary fu   -c/w nebs     Problem/Plan - 3:  ·  Problem: Type 2 diabetes mellitus without complication, with long-term current use of insulin.  Plan: basal, bolus   monitor FS    Problem/Plan - 4:  ·  Problem: Essential hypertension.  Plan: BP stable  c/w home medications   monitor routinely.     Problem/Plan - 5:  ·  Problem: CAD (coronary artery disease).  Plan: s/p CABG 2014  c/w ASA and stain   CP work-up as above.     Problem/Plan - 6:  Problem: Current smoker. Plan: 30 pack year smoking hx, currently smoking 3-4 cigarettes per day  smoking cessation

## 2020-09-10 NOTE — DIETITIAN INITIAL EVALUATION ADULT. - PERTINENT MEDS FT
MEDICATIONS  (STANDING):  aspirin enteric coated 81 milliGRAM(s) Oral daily  atorvastatin 40 milliGRAM(s) Oral at bedtime  azithromycin   Tablet 500 milliGRAM(s) Oral daily  budesonide 160 MICROgram(s)/formoterol 4.5 MICROgram(s) Inhaler 2 Puff(s) Inhalation two times a day  dextrose 5%. 1000 milliLiter(s) (50 mL/Hr) IV Continuous <Continuous>  dextrose 50% Injectable 12.5 Gram(s) IV Push once  dextrose 50% Injectable 25 Gram(s) IV Push once  dextrose 50% Injectable 25 Gram(s) IV Push once  enoxaparin Injectable 40 milliGRAM(s) SubCutaneous daily  furosemide    Tablet 20 milliGRAM(s) Oral daily  influenza   Vaccine 0.5 milliLiter(s) IntraMuscular once  insulin glargine Injectable (LANTUS) 60 Unit(s) SubCutaneous at bedtime  insulin lispro (HumaLOG) corrective regimen sliding scale   SubCutaneous three times a day before meals  insulin lispro (HumaLOG) corrective regimen sliding scale   SubCutaneous at bedtime  insulin lispro Injectable (HumaLOG) 22 Unit(s) SubCutaneous three times a day before meals  lisinopril 10 milliGRAM(s) Oral daily  methylPREDNISolone sodium succinate Injectable 20 milliGRAM(s) IV Push every 6 hours  nadolol 20 milliGRAM(s) Oral daily

## 2020-09-10 NOTE — DIETITIAN INITIAL EVALUATION ADULT. - ADD RECOMMEND
1. Continue Consistent CHO, DASH diet as ordered. 2. Pt declined nutrition education at this time; RD to follow-up and provide education as able. 3. Monitor PO intake/tolerance, weights, labs, hydration status, bowels, and skin integrity.

## 2020-09-10 NOTE — DIETITIAN INITIAL EVALUATION ADULT. - OTHER INFO
"64M with PMH of CAD s/p CABG (2014), HTN, T2DM on insulin p/w months of progressive tingling CP and TRIPATHI, c/f cardiac ischemia, admitted for further work-up."     Pt uninterested in RD assessment/Education at this time. Pt only noted he was eating well in-house & PTA and then dismissed RD and went back to sleep. Per flowsheets, Pt consuming 100% at meals. Unable to obtain diet Hx PTA; suspect pt not compliant with consistent CHO and Heart healthy diet PTA. Pt with DM, A1c 10.1% (9/7) - indicative of poor glycemic control PTA. Noted to be taking insulin PTA (Glargine & Lispro); currently receiving steroids. Noted HDL-C low. Unable to provide diet education at this time.     Wt Hx per chart: 274 Ibs (4/2018), 285 Ibs (9/2018), 282 Ibs (2/2019), 283 Ibs (9/2019). Standing weight 9/8 - 276.2 Ibs. Pt without any visual signs of muscle wasting/fat loss observed. No noted GI distress at this time; last BM 9/8. No difficulties chewing/swallowing. Per H&P, NKFA and no micronutrient supplementation documented PTA.

## 2020-09-10 NOTE — PROGRESS NOTE ADULT - ASSESSMENT
SOB   cath unremarkable   due to copd exac.   appreciate pulm eval   on steroids  improving     CAD s/p cabg   cont asa, statin   plan as above    COPD exacerbation   plan as above  pulm eval     HTN  stable     DMII  can benefit from endo eval as uncontrolled DM and on steroids   Monitor finger stick. Insulin coverage.    Tachycardia   cont nadolol

## 2020-09-10 NOTE — PROGRESS NOTE ADULT - SUBJECTIVE AND OBJECTIVE BOX
Subjective: Patient seen and examined. No new events except as noted.     SUBJECTIVE/ROS:    feels better     MEDICATIONS:  MEDICATIONS  (STANDING):  aspirin enteric coated 81 milliGRAM(s) Oral daily  atorvastatin 40 milliGRAM(s) Oral at bedtime  budesonide 160 MICROgram(s)/formoterol 4.5 MICROgram(s) Inhaler 2 Puff(s) Inhalation two times a day  dextrose 5%. 1000 milliLiter(s) (50 mL/Hr) IV Continuous <Continuous>  dextrose 50% Injectable 12.5 Gram(s) IV Push once  dextrose 50% Injectable 25 Gram(s) IV Push once  dextrose 50% Injectable 25 Gram(s) IV Push once  enoxaparin Injectable 40 milliGRAM(s) SubCutaneous daily  furosemide    Tablet 20 milliGRAM(s) Oral daily  influenza   Vaccine 0.5 milliLiter(s) IntraMuscular once  insulin glargine Injectable (LANTUS) 50 Unit(s) SubCutaneous at bedtime  insulin lispro (HumaLOG) corrective regimen sliding scale   SubCutaneous three times a day before meals  insulin lispro (HumaLOG) corrective regimen sliding scale   SubCutaneous at bedtime  insulin lispro Injectable (HumaLOG) 22 Unit(s) SubCutaneous three times a day before meals  lisinopril 10 milliGRAM(s) Oral daily  methylPREDNISolone sodium succinate Injectable 20 milliGRAM(s) IV Push every 6 hours  nadolol 20 milliGRAM(s) Oral daily      PHYSICAL EXAM:  T(C): 36.8 (09-10-20 @ 05:00), Max: 36.8 (09-10-20 @ 05:00)  HR: 85 (09-10-20 @ 05:00) (59 - 89)  BP: 120/68 (09-10-20 @ 05:00) (97/59 - 136/79)  RR: 18 (09-10-20 @ 05:00) (16 - 20)  SpO2: 95% (09-10-20 @ 05:00) (93% - 100%)  Wt(kg): --  I&O's Summary    08 Sep 2020 07:01  -  09 Sep 2020 07:00  --------------------------------------------------------  IN: 480 mL / OUT: 600 mL / NET: -120 mL    09 Sep 2020 07:01  -  10 Sep 2020 06:55  --------------------------------------------------------  IN: 360 mL / OUT: 600 mL / NET: -240 mL      Height (cm): 180.34 (09-09 @ 08:25)  Weight (kg): 124.7 (09-09 @ 08:25)  BMI (kg/m2): 38.3 (09-09 @ 08:25)  BSA (m2): 2.41 (09-09 @ 08:25)      JVP: Normal  Neck: supple  Lung: clear   CV: S1 S2 , Murmur:  Abd: soft  Ext: No edema  neuro: Awake / alert  Psych: flat affect  Skin: normal``    LABS/DATA:    CARDIAC MARKERS:  CARDIAC MARKERS ( 07 Sep 2020 14:49 )  x     / x     / 77 U/L / x     / 4.1 ng/mL                                12.7   8.94  )-----------( 217      ( 09 Sep 2020 05:42 )             40.6     09-09    138  |  100  |  17  ----------------------------<  183<H>  4.3   |  29  |  0.61    Ca    9.3      09 Sep 2020 05:42      proBNP:   Lipid Profile:   HgA1c:   TSH:     TELE:  EKG:    < from: Cardiac Cath Lab - Adult (09.09.20 @ 08:54) >  CORONARY VESSELS: The coronary circulation is right dominant.  LM:   --  LM: Normal.  LAD:   --  Proximal LAD: There was a diffuse 90 % stenosis.  CX:   --  Circumflex: Normal.  --  Mid circumflex: There was a tubular 95 % stenosis.  --  OM1: Normal.  --  L AV groove: There was a diffuse 25 % stenosis.  RCA:   --  Proximal RCA: There was a diffuse 30 % stenosis.  --  Mid RCA: There was a diffuse 40 % stenosis.  --  Distal RCA: There was a diffuse 50 % stenosis.  --  RPDA: Normal.  --  RPLS: Normal.  GRAFTS:   --  Graft to the LAD: There is a LIMA graft that is bypassed to  the middle left anterior descending artery. The graft is patent with no  obstructive coronary artery disease.  There is a SVG to the first diagonal artery. The graft is patent. There is  10% obstructive disease in the proximal segment of the graft.  There is a SVG to the second obtuse marginal artery. The graft is patent.  There is 25% obstructive disease in the proximal segment of the graft.  COMPLICATIONS: There were no complications.  DIAGNOSTIC IMPRESSIONS: Severe two vessel obstructive coronary artery  disease  --left anterior descending artery  --left circumflex artery  Normal left main coronary artery  Patent LIMA to the left anterior descending artery  Mild disease in the proximal segment of the SVG to the 1st diagonal artery  Mild disease in the proximal segment of the SVG to the 2nd obtuse marginal  artery  Right dominant system  DIAGNOSTIC RECOMMENDATIONS: Keep right leg straight for 4 hours following  removal of sheath  Continue aggressive medical management  Findings discussed with Dr. Mccurdy  INTERVENTIONAL IMPRESSIONS: Severe two vessel obstructive coronary artery  disease  --left anterior descending artery  --left circumflex artery  Normal left main coronary artery  Patent LIMA to the left anterior descending artery  Mild disease in the proximal segment of the SVG to the 1st diagonal artery  Mild disease in the proximal segment of the SVG to the 2nd obtuse marginal  artery  Right dominant system  INTERVENTIONAL RECOMMENDATIONS: Keep right leg straight for 4 hours  following removal of sheath  Continue aggressive medical management  Findings discussed with Dr. Mccurdy    < end of copied text >

## 2020-09-11 ENCOUNTER — TRANSCRIPTION ENCOUNTER (OUTPATIENT)
Age: 65
End: 2020-09-11

## 2020-09-11 VITALS
OXYGEN SATURATION: 95 % | DIASTOLIC BLOOD PRESSURE: 74 MMHG | SYSTOLIC BLOOD PRESSURE: 113 MMHG | TEMPERATURE: 98 F | HEART RATE: 79 BPM | RESPIRATION RATE: 17 BRPM

## 2020-09-11 LAB
GLUCOSE BLDC GLUCOMTR-MCNC: 245 MG/DL — HIGH (ref 70–99)
GLUCOSE BLDC GLUCOMTR-MCNC: 330 MG/DL — HIGH (ref 70–99)

## 2020-09-11 RX ORDER — AZITHROMYCIN 500 MG/1
1 TABLET, FILM COATED ORAL
Qty: 1 | Refills: 0
Start: 2020-09-11 | End: 2020-09-11

## 2020-09-11 RX ORDER — BUDESONIDE AND FORMOTEROL FUMARATE DIHYDRATE 160; 4.5 UG/1; UG/1
2 AEROSOL RESPIRATORY (INHALATION)
Qty: 3 | Refills: 0
Start: 2020-09-11 | End: 2020-10-10

## 2020-09-11 RX ADMIN — ENOXAPARIN SODIUM 40 MILLIGRAM(S): 100 INJECTION SUBCUTANEOUS at 12:02

## 2020-09-11 RX ADMIN — Medication 22 UNIT(S): at 08:13

## 2020-09-11 RX ADMIN — Medication 2: at 08:12

## 2020-09-11 RX ADMIN — Medication 20 MILLIGRAM(S): at 05:39

## 2020-09-11 RX ADMIN — Medication 20 MILLIGRAM(S): at 05:40

## 2020-09-11 RX ADMIN — BUDESONIDE AND FORMOTEROL FUMARATE DIHYDRATE 2 PUFF(S): 160; 4.5 AEROSOL RESPIRATORY (INHALATION) at 05:42

## 2020-09-11 RX ADMIN — Medication 81 MILLIGRAM(S): at 12:02

## 2020-09-11 RX ADMIN — Medication 22 UNIT(S): at 12:01

## 2020-09-11 RX ADMIN — AZITHROMYCIN 500 MILLIGRAM(S): 500 TABLET, FILM COATED ORAL at 12:02

## 2020-09-11 RX ADMIN — Medication 4: at 12:01

## 2020-09-11 RX ADMIN — LISINOPRIL 10 MILLIGRAM(S): 2.5 TABLET ORAL at 05:40

## 2020-09-11 RX ADMIN — NADOLOL 20 MILLIGRAM(S): 80 TABLET ORAL at 05:39

## 2020-09-11 NOTE — PROGRESS NOTE ADULT - REASON FOR ADMISSION
CP, TRIPATHI

## 2020-09-11 NOTE — DISCHARGE NOTE PROVIDER - CARE PROVIDER_API CALL
Candido Barger  HOSPICE/PALLIATIVE MEDICINE  [address] [place] [address]  Phone: [phone]  Fax: [phone]  Follow Up Time: 2 weeks    Tio Porter  CRITICAL CARE MEDICINE  [address] [place] [address]  Phone: [phone]  Fax: [phone]  Follow Up Time: 2 weeks    Michel Mccurdy  CARDIOVASCULAR DISEASE  [address] [address]  Phone: [phone]  Fax: [phone]  Follow Up Time: 2 weeks

## 2020-09-11 NOTE — PROGRESS NOTE ADULT - SUBJECTIVE AND OBJECTIVE BOX
Subjective: Patient seen and examined. No new events except as noted.     SUBJECTIVE/ROS:  feels much better  No chest pain, dyspnea, palpitation, or dizziness.       MEDICATIONS:  MEDICATIONS  (STANDING):  aspirin enteric coated 81 milliGRAM(s) Oral daily  atorvastatin 40 milliGRAM(s) Oral at bedtime  azithromycin   Tablet 500 milliGRAM(s) Oral daily  budesonide 160 MICROgram(s)/formoterol 4.5 MICROgram(s) Inhaler 2 Puff(s) Inhalation two times a day  dextrose 5%. 1000 milliLiter(s) (50 mL/Hr) IV Continuous <Continuous>  dextrose 50% Injectable 12.5 Gram(s) IV Push once  dextrose 50% Injectable 25 Gram(s) IV Push once  dextrose 50% Injectable 25 Gram(s) IV Push once  enoxaparin Injectable 40 milliGRAM(s) SubCutaneous daily  furosemide    Tablet 20 milliGRAM(s) Oral daily  influenza   Vaccine 0.5 milliLiter(s) IntraMuscular once  insulin glargine Injectable (LANTUS) 60 Unit(s) SubCutaneous at bedtime  insulin lispro (HumaLOG) corrective regimen sliding scale   SubCutaneous three times a day before meals  insulin lispro (HumaLOG) corrective regimen sliding scale   SubCutaneous at bedtime  insulin lispro Injectable (HumaLOG) 22 Unit(s) SubCutaneous three times a day before meals  lisinopril 10 milliGRAM(s) Oral daily  nadolol 20 milliGRAM(s) Oral daily      PHYSICAL EXAM:  T(C): 36.4 (09-11-20 @ 04:10), Max: 36.7 (09-10-20 @ 20:33)  HR: 73 (09-11-20 @ 04:10) (73 - 93)  BP: 125/73 (09-11-20 @ 04:10) (125/73 - 134/76)  RR: 18 (09-11-20 @ 04:10) (16 - 18)  SpO2: 96% (09-11-20 @ 04:10) (95% - 96%)  Wt(kg): --  I&O's Summary    09 Sep 2020 07:01  -  10 Sep 2020 07:00  --------------------------------------------------------  IN: 360 mL / OUT: 600 mL / NET: -240 mL    10 Sep 2020 07:01  -  11 Sep 2020 06:22  --------------------------------------------------------  IN: 1000 mL / OUT: 0 mL / NET: 1000 mL            JVP: Normal  Neck: supple  Lung: clear   CV: S1 S2 , Murmur:  Abd: soft  Ext: No edema  neuro: Awake / alert  Psych: flat affect  Skin: normal``    LABS/DATA:    CARDIAC MARKERS:                  proBNP:   Lipid Profile:   HgA1c:   TSH:     TELE:  EKG:

## 2020-09-11 NOTE — DISCHARGE NOTE PROVIDER - NSDCCPCAREPLAN_GEN_ALL_CORE_FT
PRINCIPAL DISCHARGE DIAGNOSIS  Diagnosis: COPD exacerbation  Assessment and Plan of Treatment: Follow up with pmd in 2 weeks      SECONDARY DISCHARGE DIAGNOSES  Diagnosis: CAD (coronary artery disease)  Assessment and Plan of Treatment: CAD (coronary artery disease)    Diagnosis: Essential hypertension  Assessment and Plan of Treatment: Low salt diet  Activity as tolerated.  Take all medication as prescribed.  Follow up with your medical doctor for routine blood pressure monitoring at your next visit.  Notify your doctor if you have any of the following symptoms:   Dizziness, Lightheadedness, Blurry vision, Headache, Chest pain, Shortness of breath    Diagnosis: Current smoker  Assessment and Plan of Treatment: Current smoker: smoking cessation discussed    Diagnosis: Type 2 diabetes mellitus without complication, with long-term current use of insulin  Assessment and Plan of Treatment: Type 2 diabetes mellitus without complication, with long-term current use of insulin

## 2020-09-11 NOTE — DISCHARGE NOTE PROVIDER - HOSPITAL COURSE
64M with PMH of CAD s/p CABG (2014), HTN, T2DM on insulin p/w months of progressive tingling CP and TRIPATHI, c/f cardiac ischemia, admitted for further work-up.       Problem: Chest pain,s/p cath for ischemic w/u, diagnostic results      Problem: COPD exac: IV solumedrol , no po steroids , ct chest : Bronchitis - po abx x 3 days       Problem: Type 2 diabetes mellitus without complication, with long-term current use of insulin.  Plan: basal, bolus       Problem: Essential hypertension. BP stable      Problem: CAD (coronary artery disease). s/p CABG 2014, c/w ASA and stain       Problem: Current smoker. 30 pack year smoking hx, currently smoking 3-4 cigarettes per day    smoking cessation

## 2020-09-11 NOTE — DISCHARGE NOTE PROVIDER - NSDCMRMEDTOKEN_GEN_ALL_CORE_FT
albuterol 90 mcg/inh inhalation aerosol: 2 puff(s) inhaled every 6 hours, As needed, Shortness of Breath and/or Wheezing  aspirin 81 mg oral delayed release tablet: 1 tab(s) orally once a day  atorvastatin 40 mg oral tablet: 1 tab(s) orally once a day (at bedtime)  azithromycin 500 mg oral tablet: 1 tab(s) orally once a day  budesonide-formoterol 160 mcg-4.5 mcg/inh inhalation aerosol: 2 puff(s) inhaled 2 times a day   insulin glargine: 55 unit(s) subcutaneous once a day (at bedtime)  insulin lispro 100 units/mL subcutaneous solution: 20 unit(s) subcutaneous 3 times a day (before meals)  Lasix 20 mg oral tablet: 1 tab(s) orally once a day  lisinopril 10 mg oral tablet: 1 tab(s) orally once a day  nadolol 20 mg oral tablet: 1 tab(s) orally once a day albuterol 90 mcg/inh inhalation aerosol: 2 puff(s) inhaled every 6 hours, As needed, Shortness of Breath and/or Wheezing  aspirin 81 mg oral delayed release tablet: 1 tab(s) orally once a day  atorvastatin 40 mg oral tablet: 1 tab(s) orally once a day (at bedtime)  azithromycin 500 mg oral tablet: 1 tab(s) orally once a day  budesonide-formoterol 160 mcg-4.5 mcg/inh inhalation aerosol: 2 puff(s) inhaled 2 times a day   insulin glargine: 55 unit(s) subcutaneous once a day (at bedtime)  insulin lispro 100 units/mL subcutaneous solution: 20 unit(s) subcutaneous 3 times a day (before meals)  Lasix 20 mg oral tablet: 1 tab(s) orally once a day  lisinopril 10 mg oral tablet: 1 tab(s) orally once a day  nadolol 20 mg oral tablet: 1 tab(s) orally once a day  predniSONE 10 mg oral tablet: 3 tab(s) orally once a day

## 2020-09-11 NOTE — DISCHARGE NOTE PROVIDER - PROVIDER TOKENS
PROVIDER:[TOKEN:[[id number]],FOLLOWUP:[2 weeks]],PROVIDER:[TOKEN:[[id number]],FOLLOWUP:[2 weeks]],PROVIDER:[TOKEN:[[id number]],FOLLOWUP:[2 weeks]]

## 2020-09-11 NOTE — PROGRESS NOTE ADULT - ASSESSMENT
64M with PMH of CAD s/p CABG (2014), HTN, T2DM on insulin p/w months of progressive tingling CP and TRIPATHI, c/f cardiac ischemia, admitted for further work-up.     Likely copd: active smoker: wheezing: unable to sleep:  CAD:  s/p cath: patent grafts  htn  dm  cabg: cad    He has been wheezing actively and is u nable to sleep:  he would need steroids for some time: start symbicort:  he was advised last time on admission too, that he shold cpap but he is still not using it:  His room air oxygenation is fine:  HIS GRAFTS ARE PATENT: S/P CATH:  his blood glcuse needs to be monitored closely on steroids:  He has been advised strongly to stop smoking:  Hs last ct scan chest from 2018 was negative:  since he is an active smoker: high chances of malignancy: he would need repeat ct scan chest   DW team     9/10:  he feels much better:  ct scan chest noted:  has bronchitis and bronchiolitis   Cont steroids for now: change to po in am: adv to stop smoking: he has SHAYY too: he needs to go back to his pvt [facility] to get the machine again:  cont cardiac care:  need to be checked for home oxygen requirement:  FREDRICK NP    9/11: doing pretty good: wheezing almost resolved:  change to po 30 mg of prednisone for 5 days:  adv to lose weight and need to use cpap at home:   he needs to see sleep specialist:   Finish 3 days total of antibiotics for ac bronchitis:  FREDRICK NP

## 2020-09-11 NOTE — PROGRESS NOTE ADULT - SUBJECTIVE AND OBJECTIVE BOX
Patient is a 64y old  Male who presents with a chief complaint of CP, TRIPATHI (11 Sep 2020 11:22)      Any change in ROS: Doing much better: his breathing has improved significantly     MEDICATIONS  (STANDING):  aspirin enteric coated 81 milliGRAM(s) Oral daily  atorvastatin 40 milliGRAM(s) Oral at bedtime  azithromycin   Tablet 500 milliGRAM(s) Oral daily  budesonide 160 MICROgram(s)/formoterol 4.5 MICROgram(s) Inhaler 2 Puff(s) Inhalation two times a day  dextrose 5%. 1000 milliLiter(s) (50 mL/Hr) IV Continuous <Continuous>  dextrose 50% Injectable 12.5 Gram(s) IV Push once  dextrose 50% Injectable 25 Gram(s) IV Push once  dextrose 50% Injectable 25 Gram(s) IV Push once  enoxaparin Injectable 40 milliGRAM(s) SubCutaneous daily  furosemide    Tablet 20 milliGRAM(s) Oral daily  influenza   Vaccine 0.5 milliLiter(s) IntraMuscular once  insulin glargine Injectable (LANTUS) 60 Unit(s) SubCutaneous at bedtime  insulin lispro (HumaLOG) corrective regimen sliding scale   SubCutaneous three times a day before meals  insulin lispro (HumaLOG) corrective regimen sliding scale   SubCutaneous at bedtime  insulin lispro Injectable (HumaLOG) 22 Unit(s) SubCutaneous three times a day before meals  lisinopril 10 milliGRAM(s) Oral daily  nadolol 20 milliGRAM(s) Oral daily    MEDICATIONS  (PRN):  acetaminophen   Tablet .. 650 milliGRAM(s) Oral every 6 hours PRN Temp greater or equal to 38C (100.4F), Moderate Pain (4 - 6)  ALBUTerol    90 MICROgram(s) HFA Inhaler 2 Puff(s) Inhalation every 6 hours PRN Shortness of Breath and/or Wheezing  dextrose 40% Gel 15 Gram(s) Oral once PRN Blood Glucose LESS THAN 70 milliGRAM(s)/deciliter  glucagon  Injectable 1 milliGRAM(s) IntraMuscular once PRN Glucose LESS THAN 70 milligrams/deciliter    Vital Signs Last 24 Hrs  T(C): 36.6 (11 Sep 2020 12:59), Max: 36.7 (10 Sep 2020 20:33)  T(F): 97.9 (11 Sep 2020 12:59), Max: 98 (10 Sep 2020 20:33)  HR: 79 (11 Sep 2020 12:59) (73 - 92)  BP: 113/74 (11 Sep 2020 12:59) (113/74 - 134/76)  BP(mean): --  RR: 17 (11 Sep 2020 12:59) (17 - 18)  SpO2: 95% (11 Sep 2020 12:59) (95% - 96%)    I&O's Summary    10 Sep 2020 07:01  -  11 Sep 2020 07:00  --------------------------------------------------------  IN: 1000 mL / OUT: 0 mL / NET: 1000 mL          Physical Exam:   GENERAL: NAD, well-groomed, well-developed  HEENT: JOSÉ LUIS/   Atraumatic, Normocephalic  ENMT: No tonsillar erythema, exudates, or enlargement; Moist mucous membranes, Good dentition, No lesions  NECK: Supple, No JVD, Normal thyroid  CHEST/LUNG: Clear to auscultaion  CVS: Regular rate and rhythm; No murmurs, rubs, or gallops  GI: : Soft, Nontender, Nondistended; Bowel sounds present  NERVOUS SYSTEM:  Alert & Oriented Xc  EXTREMITIES:  2+ Peripheral Pulses, No clubbing, cyanosis, or edema  LYMPH: No lymphadenopathy noted  SKIN: No rashes or lesions  ENDOCRINOLOGY: No Thyromegaly  PSYCH: Appropriate    Labs:  34                            12.7   8.94  )-----------( 217      ( 09 Sep 2020 05:42 )             40.6     09-09    138  |  100  |  17  ----------------------------<  183<H>  4.3   |  29  |  0.61        CAPILLARY BLOOD GLUCOSE      POCT Blood Glucose.: 330 mg/dL (11 Sep 2020 11:56)  POCT Blood Glucose.: 245 mg/dL (11 Sep 2020 07:54)  POCT Blood Glucose.: 371 mg/dL (10 Sep 2020 21:50)  POCT Blood Glucose.: 244 mg/dL (10 Sep 2020 16:48)        < from: CT Chest No Cont (09.09.20 @ 17:42) >  TERPRETATION:  CLINICAL INFORMATION: Shortness of breath, rule out pulmonary embolus    COMPARISON: 4/20/2018    PROCEDURE:  CT of the Chest was performed without intravenous contrast.  Sagittal and coronal reformats were performed.      FINDINGS:    AIRWAYS, LUNGS and PLEURA: Patent central airways. Bronchial wall thickening. Mucoid impactions and tree-in-bud opacities within the right lower lobe likely representing bronchiolitis. No consolidation.    No pleural effusion.    MEDIASTINUM AND BETTE: No lymphadenopathy.    HEART AND VESSELS: Heart size is normal. No pericardial effusion. Pulmonary embolus is not evaluated secondary to lack of IV contrast. Sternotomy and CABG. Thoracic aorta normal in caliber.    VISUALIZED UPPER ABDOMEN: Hepatic steatosis.    CHEST WALL AND BONES: No aggressive osseous lesion.    LOWER NECK: Within normal limits.    IMPRESSION:    Pulmonary embolus is not evaluated secondary to lack of IV contrast.    Bronchitis and bronchiolitis.                  BRAXTON EAST M.D.,ATTENDING RADIOLOGIST  This document has been electronically signed. Sep  9 2020  6:15PM        < end of copied text >            RECENT CULTURES:        RESPIRATORY CULTURES:          Studies  Chest X-RAY  CT SCAN Chest   Venous Dopplers: LE:   CT Abdomen  Others

## 2020-09-11 NOTE — PROGRESS NOTE ADULT - ASSESSMENT
SOB   cath unremarkable   due to copd exac.   appreciate pulm eval   improving well     CAD s/p cabg   cont asa, statin   plan as above    COPD exacerbation   plan as above  pulm eval appreciated   much improved     HTN  stable     DMII  can benefit from endo eval as uncontrolled DM and on steroids   Monitor finger stick. Insulin coverage.    Tachycardia   cont nadolol   stable

## 2020-09-11 NOTE — DISCHARGE NOTE NURSING/CASE MANAGEMENT/SOCIAL WORK - PATIENT PORTAL LINK FT
You can access the FollowMyHealth Patient Portal offered by [facility] by registering at the following website: [email or web address]. By joining [employer]’s FollowMyHealth portal, you will also be able to view your health information using other applications (apps) compatible with our system.

## 2020-09-28 ENCOUNTER — APPOINTMENT (OUTPATIENT)
Dept: PULMONOLOGY | Facility: CLINIC | Age: 65
End: 2020-09-28
Payer: MEDICAID

## 2020-09-28 DIAGNOSIS — Z87.09 PERSONAL HISTORY OF OTHER DISEASES OF THE RESPIRATORY SYSTEM: ICD-10-CM

## 2020-09-28 PROCEDURE — 99406 BEHAV CHNG SMOKING 3-10 MIN: CPT

## 2020-09-28 PROCEDURE — 99204 OFFICE O/P NEW MOD 45 MIN: CPT | Mod: 25

## 2020-10-04 VITALS
SYSTOLIC BLOOD PRESSURE: 130 MMHG | DIASTOLIC BLOOD PRESSURE: 70 MMHG | HEART RATE: 100 BPM | BODY MASS INDEX: 38.92 KG/M2 | HEIGHT: 71 IN | TEMPERATURE: 98 F | WEIGHT: 278 LBS | RESPIRATION RATE: 18 BRPM | OXYGEN SATURATION: 96 %

## 2020-10-04 NOTE — COUNSELING
[Risk of tobacco use and health benefits of smoking cessation discussed] : Risk of tobacco use and health benefits of smoking cessation discussed [Cessation strategies including cessation program discussed] : Cessation strategies including cessation program discussed [Willing to Quit Smoking] : Willing to quit smoking [Tobacco Use Cessation Intermediate Greater Than 3 Minutes Up to 10 Minutes] : Tobacco Use Cessation Intermediate Greater Than 3 Minutes Up to 10 Minutes [FreeTextEntry1] : 10

## 2020-10-04 NOTE — HISTORY OF PRESENT ILLNESS
[Current] : current [< 30 pack-years] : < 30 pack-years [Awakes Unrefreshed] : awakes unrefreshed [Awakes with Dry Mouth] : awakes with dry mouth [Daytime Somnolence] : daytime somnolence [Fatigue] : fatigue [Hypersomnolence] : hypersomnolence [Witnessed Apneas] : witnessed apneas [TextBox_4] : 63 yo male presents for evaluation of possible sleep apnea. The patient complains of daytime somnolence and fatigue. He snores but is unaware of apneic episodes. [TextBox_11] : 1/4 [TextBox_13] : 40

## 2020-10-04 NOTE — PHYSICAL EXAM
[No Acute Distress] : no acute distress [Normal Oropharynx] : normal oropharynx [Normal Appearance] : normal appearance [No Neck Mass] : no neck mass [Normal Rate/Rhythm] : normal rate/rhythm [Normal S1, S2] : normal s1, s2 [No Murmurs] : no murmurs [No Resp Distress] : no resp distress [Clear to Auscultation Bilaterally] : clear to auscultation bilaterally [No Abnormalities] : no abnormalities [Benign] : benign [Normal Gait] : normal gait [No Clubbing] : no clubbing [No Cyanosis] : no cyanosis [No Edema] : no edema [FROM] : FROM [Normal Color/ Pigmentation] : normal color/ pigmentation [No Focal Deficits] : no focal deficits [Oriented x3] : oriented x3 [Normal Affect] : normal affect [TextBox_54] : Sternotomy scar

## 2020-10-04 NOTE — DISCUSSION/SUMMARY
[FreeTextEntry1] : 63 yo male with with likely sleep apnea. I had a long discussion regarding sleep apnea and the need for treatment if present.PSG will be performed. Diet and weight were stressed. He is to avoid sedative/hypnotic meds and excessive alcohol use.Smoking cessation was stressed. He is to follow up with his PMD as before.

## 2020-10-14 ENCOUNTER — APPOINTMENT (OUTPATIENT)
Dept: INTERNAL MEDICINE | Facility: CLINIC | Age: 65
End: 2020-10-14
Payer: MEDICAID

## 2020-10-14 ENCOUNTER — NON-APPOINTMENT (OUTPATIENT)
Age: 65
End: 2020-10-14

## 2020-10-14 VITALS
TEMPERATURE: 94.6 F | WEIGHT: 272.57 LBS | DIASTOLIC BLOOD PRESSURE: 64 MMHG | BODY MASS INDEX: 38.59 KG/M2 | OXYGEN SATURATION: 94 % | SYSTOLIC BLOOD PRESSURE: 107 MMHG | HEIGHT: 70.47 IN | HEART RATE: 97 BPM

## 2020-10-14 DIAGNOSIS — Z71.89 OTHER SPECIFIED COUNSELING: ICD-10-CM

## 2020-10-14 DIAGNOSIS — Z23 ENCOUNTER FOR IMMUNIZATION: ICD-10-CM

## 2020-10-14 DIAGNOSIS — Z00.00 ENCOUNTER FOR GENERAL ADULT MEDICAL EXAMINATION W/OUT ABNORMAL FINDINGS: ICD-10-CM

## 2020-10-14 PROCEDURE — 36415 COLL VENOUS BLD VENIPUNCTURE: CPT

## 2020-10-14 PROCEDURE — 99202 OFFICE O/P NEW SF 15 MIN: CPT | Mod: 25

## 2020-10-15 ENCOUNTER — APPOINTMENT (OUTPATIENT)
Dept: INTERNAL MEDICINE | Facility: CLINIC | Age: 65
End: 2020-10-15
Payer: SELF-PAY

## 2020-10-15 PROCEDURE — 99499D: CUSTOM

## 2020-10-18 NOTE — ASSESSMENT
[FreeTextEntry1] : Blood work done today influenza administered, COpd stable following with pulm, advised eye exam and strict ADA  diet for diabetes, check urine microalbumin

## 2020-10-18 NOTE — HISTORY OF PRESENT ILLNESS
[FreeTextEntry1] : 64-year-old male presents for her annual physical [de-identified] : 64-year-old male with past medical history of diabetes, COPD  and CAD presents for annual physical. Denies any chest pain chest tightness shortness of breath denies any coughing or wheezing. Will be following up with cardiology, interested in flu shot. Has been limiting smoking to a few cigarettes a day. Recently discharged with COPD exacerbation.

## 2020-10-18 NOTE — HEALTH RISK ASSESSMENT
[Fair] :  ~his/her~ mood as fair [Patient reported colonoscopy was normal] : Patient reported colonoscopy was normal [FreeTextEntry1] : diabetes, COPd [de-identified] : none [de-identified] : Dr. Patterson [ColonoscopyDate] : 1/2018

## 2020-10-18 NOTE — PHYSICAL EXAM
[No Mass] : no mass [Normal Sphincter Tone] : normal sphincter tone [Prostate Nodule] : did not have a nodule [Prostate Enlarged] : was enlarged [Prostate Tenderness] : was not tender [Comprehensive Foot Exam Normal] : Right and left foot were examined and both feet are normal. No ulcers in either foot. Toes are normal and with full ROM.  Normal tactile sensation with monofilament testing throughout both feet [Normal] : normal gait, coordination grossly intact, no focal deficits and deep tendon reflexes were 2+ and symmetric

## 2020-10-19 LAB
25(OH)D3 SERPL-MCNC: 27.3 NG/ML
ALBUMIN SERPL ELPH-MCNC: 4.5 G/DL
ALP BLD-CCNC: 92 U/L
ALT SERPL-CCNC: 18 U/L
ANION GAP SERPL CALC-SCNC: 11 MMOL/L
APPEARANCE: CLEAR
AST SERPL-CCNC: 19 U/L
BACTERIA: NEGATIVE
BASOPHILS # BLD AUTO: 0.06 K/UL
BASOPHILS NFR BLD AUTO: 0.5 %
BILIRUB SERPL-MCNC: 0.2 MG/DL
BILIRUBIN URINE: NEGATIVE
BLOOD URINE: NEGATIVE
BUN SERPL-MCNC: 18 MG/DL
CALCIUM OXALATE CRYSTALS: ABNORMAL
CALCIUM SERPL-MCNC: 10.2 MG/DL
CHLORIDE SERPL-SCNC: 100 MMOL/L
CHOLEST SERPL-MCNC: 131 MG/DL
CHOLEST/HDLC SERPL: 2.5 RATIO
CO2 SERPL-SCNC: 31 MMOL/L
COLOR: YELLOW
CREAT SERPL-MCNC: 0.94 MG/DL
EOSINOPHIL # BLD AUTO: 0.27 K/UL
EOSINOPHIL NFR BLD AUTO: 2.1 %
ESTIMATED AVERAGE GLUCOSE: 237 MG/DL
GLUCOSE QUALITATIVE U: ABNORMAL
GLUCOSE SERPL-MCNC: 46 MG/DL
HBA1C MFR BLD HPLC: 9.9 %
HCT VFR BLD CALC: 43.6 %
HDLC SERPL-MCNC: 52 MG/DL
HGB BLD-MCNC: 13.3 G/DL
HYALINE CASTS: 0 /LPF
IMM GRANULOCYTES NFR BLD AUTO: 0.4 %
KETONES URINE: NEGATIVE
LDLC SERPL CALC-MCNC: 54 MG/DL
LEUKOCYTE ESTERASE URINE: NEGATIVE
LYMPHOCYTES # BLD AUTO: 2.95 K/UL
LYMPHOCYTES NFR BLD AUTO: 22.6 %
MAN DIFF?: NORMAL
MCHC RBC-ENTMCNC: 28.1 PG
MCHC RBC-ENTMCNC: 30.5 GM/DL
MCV RBC AUTO: 92 FL
MICROSCOPIC-UA: NORMAL
MONOCYTES # BLD AUTO: 0.74 K/UL
MONOCYTES NFR BLD AUTO: 5.7 %
NEUTROPHILS # BLD AUTO: 8.98 K/UL
NEUTROPHILS NFR BLD AUTO: 68.7 %
NITRITE URINE: NEGATIVE
PH URINE: 6
PLATELET # BLD AUTO: 326 K/UL
POTASSIUM SERPL-SCNC: 4.8 MMOL/L
PROT SERPL-MCNC: 7.3 G/DL
PROTEIN URINE: NORMAL
PSA SERPL-MCNC: 1.37 NG/ML
RBC # BLD: 4.74 M/UL
RBC # FLD: 13.6 %
RED BLOOD CELLS URINE: 0 /HPF
SODIUM SERPL-SCNC: 141 MMOL/L
SPECIFIC GRAVITY URINE: 1.03
SQUAMOUS EPITHELIAL CELLS: 0 /HPF
TRIGL SERPL-MCNC: 126 MG/DL
TSH SERPL-ACNC: 3.31 UIU/ML
UROBILINOGEN URINE: NORMAL
VIT B12 SERPL-MCNC: 506 PG/ML
WBC # FLD AUTO: 13.05 K/UL
WHITE BLOOD CELLS URINE: 1 /HPF

## 2020-10-29 PROCEDURE — 82330 ASSAY OF CALCIUM: CPT

## 2020-10-29 PROCEDURE — 85027 COMPLETE CBC AUTOMATED: CPT

## 2020-10-29 PROCEDURE — 82947 ASSAY GLUCOSE BLOOD QUANT: CPT

## 2020-10-29 PROCEDURE — C8929: CPT

## 2020-10-29 PROCEDURE — 36415 COLL VENOUS BLD VENIPUNCTURE: CPT

## 2020-10-29 PROCEDURE — C1894: CPT

## 2020-10-29 PROCEDURE — 84100 ASSAY OF PHOSPHORUS: CPT

## 2020-10-29 PROCEDURE — U0003: CPT

## 2020-10-29 PROCEDURE — 93455 CORONARY ART/GRFT ANGIO S&I: CPT

## 2020-10-29 PROCEDURE — C1887: CPT

## 2020-10-29 PROCEDURE — 82550 ASSAY OF CK (CPK): CPT

## 2020-10-29 PROCEDURE — 82803 BLOOD GASES ANY COMBINATION: CPT

## 2020-10-29 PROCEDURE — 83605 ASSAY OF LACTIC ACID: CPT

## 2020-10-29 PROCEDURE — 86803 HEPATITIS C AB TEST: CPT

## 2020-10-29 PROCEDURE — 86769 SARS-COV-2 COVID-19 ANTIBODY: CPT

## 2020-10-29 PROCEDURE — 84484 ASSAY OF TROPONIN QUANT: CPT

## 2020-10-29 PROCEDURE — 83880 ASSAY OF NATRIURETIC PEPTIDE: CPT

## 2020-10-29 PROCEDURE — 85014 HEMATOCRIT: CPT

## 2020-10-29 PROCEDURE — 83036 HEMOGLOBIN GLYCOSYLATED A1C: CPT

## 2020-10-29 PROCEDURE — 80053 COMPREHEN METABOLIC PANEL: CPT

## 2020-10-29 PROCEDURE — 84443 ASSAY THYROID STIM HORMONE: CPT

## 2020-10-29 PROCEDURE — 83735 ASSAY OF MAGNESIUM: CPT

## 2020-10-29 PROCEDURE — 93005 ELECTROCARDIOGRAM TRACING: CPT

## 2020-10-29 PROCEDURE — 94640 AIRWAY INHALATION TREATMENT: CPT

## 2020-10-29 PROCEDURE — 80048 BASIC METABOLIC PNL TOTAL CA: CPT

## 2020-10-29 PROCEDURE — 82962 GLUCOSE BLOOD TEST: CPT

## 2020-10-29 PROCEDURE — 71046 X-RAY EXAM CHEST 2 VIEWS: CPT

## 2020-10-29 PROCEDURE — C1769: CPT

## 2020-10-29 PROCEDURE — 99285 EMERGENCY DEPT VISIT HI MDM: CPT

## 2020-10-29 PROCEDURE — 80061 LIPID PANEL: CPT

## 2020-10-29 PROCEDURE — 84295 ASSAY OF SERUM SODIUM: CPT

## 2020-10-29 PROCEDURE — 71250 CT THORAX DX C-: CPT

## 2020-10-29 PROCEDURE — 84132 ASSAY OF SERUM POTASSIUM: CPT

## 2020-10-29 PROCEDURE — 99153 MOD SED SAME PHYS/QHP EA: CPT

## 2020-10-29 PROCEDURE — 99152 MOD SED SAME PHYS/QHP 5/>YRS: CPT

## 2020-10-29 PROCEDURE — 82553 CREATINE MB FRACTION: CPT

## 2020-10-29 PROCEDURE — 85018 HEMOGLOBIN: CPT

## 2020-10-29 PROCEDURE — 82435 ASSAY OF BLOOD CHLORIDE: CPT

## 2020-11-05 ENCOUNTER — APPOINTMENT (OUTPATIENT)
Dept: DISASTER EMERGENCY | Facility: CLINIC | Age: 65
End: 2020-11-05

## 2020-11-19 ENCOUNTER — APPOINTMENT (OUTPATIENT)
Dept: DISASTER EMERGENCY | Facility: CLINIC | Age: 65
End: 2020-11-19

## 2020-11-20 ENCOUNTER — APPOINTMENT (OUTPATIENT)
Dept: DISASTER EMERGENCY | Facility: CLINIC | Age: 65
End: 2020-11-20

## 2020-11-23 ENCOUNTER — APPOINTMENT (OUTPATIENT)
Dept: PULMONOLOGY | Facility: CLINIC | Age: 65
End: 2020-11-23

## 2020-11-28 ENCOUNTER — APPOINTMENT (OUTPATIENT)
Dept: DISASTER EMERGENCY | Facility: CLINIC | Age: 65
End: 2020-11-28

## 2020-11-29 LAB — SARS-COV-2 N GENE NPH QL NAA+PROBE: NOT DETECTED

## 2020-11-30 ENCOUNTER — APPOINTMENT (OUTPATIENT)
Dept: PULMONOLOGY | Facility: CLINIC | Age: 65
End: 2020-11-30

## 2020-12-01 ENCOUNTER — APPOINTMENT (OUTPATIENT)
Dept: PULMONOLOGY | Facility: CLINIC | Age: 65
End: 2020-12-01
Payer: MEDICAID

## 2020-12-01 VITALS
BODY MASS INDEX: 39.64 KG/M2 | TEMPERATURE: 98.6 F | HEART RATE: 93 BPM | WEIGHT: 280 LBS | DIASTOLIC BLOOD PRESSURE: 70 MMHG | SYSTOLIC BLOOD PRESSURE: 114 MMHG | OXYGEN SATURATION: 95 %

## 2020-12-01 DIAGNOSIS — F17.200 NICOTINE DEPENDENCE, UNSPECIFIED, UNCOMPLICATED: ICD-10-CM

## 2020-12-01 PROCEDURE — 94727 GAS DIL/WSHOT DETER LNG VOL: CPT

## 2020-12-01 PROCEDURE — 99406 BEHAV CHNG SMOKING 3-10 MIN: CPT

## 2020-12-01 PROCEDURE — 99214 OFFICE O/P EST MOD 30 MIN: CPT | Mod: 25

## 2020-12-01 PROCEDURE — 94060 EVALUATION OF WHEEZING: CPT

## 2020-12-01 PROCEDURE — 99072 ADDL SUPL MATRL&STAF TM PHE: CPT

## 2020-12-01 PROCEDURE — 94729 DIFFUSING CAPACITY: CPT

## 2020-12-02 RX ORDER — BUDESONIDE AND FORMOTEROL FUMARATE DIHYDRATE 160; 4.5 UG/1; UG/1
160-4.5 AEROSOL RESPIRATORY (INHALATION) TWICE DAILY
Qty: 1 | Refills: 3 | Status: ACTIVE | COMMUNITY
Start: 2020-12-02 | End: 1900-01-01

## 2020-12-04 ENCOUNTER — APPOINTMENT (OUTPATIENT)
Dept: PULMONOLOGY | Facility: CLINIC | Age: 65
End: 2020-12-04

## 2020-12-05 NOTE — HISTORY OF PRESENT ILLNESS
[Current] : current [>= 30 pack years] : >= 30 pack years [Awakes Unrefreshed] : awakes unrefreshed [Awakes with Dry Mouth] : awakes with dry mouth [Daytime Somnolence] : daytime somnolence [Snoring] : snoring [Witnessed Apneas] : witnessed apneas [TextBox_4] : 64 yo male with likely sleep apnea by history, presents for follow up. The patient did not have PSG as ordered last month. He continues to complain of daytime somnolence and fatigue with snoring. He states that he is "not sleeping well" with frequent awakenings.He also complains of cough with wheezing PRN. He continues to smoke but "much less". [TextBox_11] : 1 [TextBox_13] : 30

## 2020-12-05 NOTE — DISCUSSION/SUMMARY
[FreeTextEntry1] : 66 yo male with OAD related complaints. Smoking cessation stressed. He was given a sample of breztri (ICS/LABA/LAMA) with PRN albuterol MDI. Treatment will depend on symptomatic needs.PSG will again be ordered.A low dose screening chest CT will be performed. He is to follow up with his PMD as before and for the influenza vaccine.

## 2020-12-07 ENCOUNTER — APPOINTMENT (OUTPATIENT)
Dept: PULMONOLOGY | Facility: CLINIC | Age: 65
End: 2020-12-07

## 2020-12-07 ENCOUNTER — NON-APPOINTMENT (OUTPATIENT)
Age: 65
End: 2020-12-07

## 2020-12-17 ENCOUNTER — APPOINTMENT (OUTPATIENT)
Dept: INTERNAL MEDICINE | Facility: CLINIC | Age: 65
End: 2020-12-17
Payer: SELF-PAY

## 2020-12-17 PROCEDURE — 99499D: CUSTOM

## 2021-01-21 ENCOUNTER — RX RENEWAL (OUTPATIENT)
Age: 66
End: 2021-01-21

## 2021-01-22 ENCOUNTER — APPOINTMENT (OUTPATIENT)
Dept: INTERNAL MEDICINE | Facility: CLINIC | Age: 66
End: 2021-01-22
Payer: SELF-PAY

## 2021-01-22 PROCEDURE — 99499D: CUSTOM

## 2021-02-01 ENCOUNTER — EMERGENCY (EMERGENCY)
Facility: HOSPITAL | Age: 66
LOS: 1 days | End: 2021-02-01
Attending: EMERGENCY MEDICINE
Payer: MEDICAID

## 2021-02-01 VITALS
SYSTOLIC BLOOD PRESSURE: 133 MMHG | WEIGHT: 285.06 LBS | OXYGEN SATURATION: 95 % | DIASTOLIC BLOOD PRESSURE: 78 MMHG | RESPIRATION RATE: 18 BRPM | HEIGHT: 71 IN | TEMPERATURE: 99 F | HEART RATE: 105 BPM

## 2021-02-01 DIAGNOSIS — Z95.1 PRESENCE OF AORTOCORONARY BYPASS GRAFT: Chronic | ICD-10-CM

## 2021-02-01 DIAGNOSIS — Z98.89 OTHER SPECIFIED POSTPROCEDURAL STATES: Chronic | ICD-10-CM

## 2021-02-01 LAB
ALBUMIN SERPL ELPH-MCNC: 4.1 G/DL — SIGNIFICANT CHANGE UP (ref 3.3–5)
ALP SERPL-CCNC: 71 U/L — SIGNIFICANT CHANGE UP (ref 40–120)
ALT FLD-CCNC: 25 U/L — SIGNIFICANT CHANGE UP (ref 10–45)
ANION GAP SERPL CALC-SCNC: 11 MMOL/L — SIGNIFICANT CHANGE UP (ref 5–17)
APPEARANCE UR: CLEAR — SIGNIFICANT CHANGE UP
APTT BLD: 31.9 SEC — SIGNIFICANT CHANGE UP (ref 27.5–35.5)
AST SERPL-CCNC: 26 U/L — SIGNIFICANT CHANGE UP (ref 10–40)
BACTERIA # UR AUTO: NEGATIVE — SIGNIFICANT CHANGE UP
BASOPHILS # BLD AUTO: 0.03 K/UL — SIGNIFICANT CHANGE UP (ref 0–0.2)
BASOPHILS NFR BLD AUTO: 0.4 % — SIGNIFICANT CHANGE UP (ref 0–2)
BILIRUB SERPL-MCNC: 0.2 MG/DL — SIGNIFICANT CHANGE UP (ref 0.2–1.2)
BILIRUB UR-MCNC: NEGATIVE — SIGNIFICANT CHANGE UP
BUN SERPL-MCNC: 19 MG/DL — SIGNIFICANT CHANGE UP (ref 7–23)
CALCIUM SERPL-MCNC: 9.7 MG/DL — SIGNIFICANT CHANGE UP (ref 8.4–10.5)
CHLORIDE SERPL-SCNC: 103 MMOL/L — SIGNIFICANT CHANGE UP (ref 96–108)
CO2 SERPL-SCNC: 26 MMOL/L — SIGNIFICANT CHANGE UP (ref 22–31)
COLOR SPEC: SIGNIFICANT CHANGE UP
CREAT SERPL-MCNC: 1 MG/DL — SIGNIFICANT CHANGE UP (ref 0.5–1.3)
DIFF PNL FLD: NEGATIVE — SIGNIFICANT CHANGE UP
EOSINOPHIL # BLD AUTO: 0.22 K/UL — SIGNIFICANT CHANGE UP (ref 0–0.5)
EOSINOPHIL NFR BLD AUTO: 2.7 % — SIGNIFICANT CHANGE UP (ref 0–6)
EPI CELLS # UR: 0 /HPF — SIGNIFICANT CHANGE UP
GLUCOSE SERPL-MCNC: 115 MG/DL — HIGH (ref 70–99)
GLUCOSE UR QL: ABNORMAL
HCT VFR BLD CALC: 40.4 % — SIGNIFICANT CHANGE UP (ref 39–50)
HGB BLD-MCNC: 13.2 G/DL — SIGNIFICANT CHANGE UP (ref 13–17)
IMM GRANULOCYTES NFR BLD AUTO: 0.6 % — SIGNIFICANT CHANGE UP (ref 0–1.5)
INR BLD: 0.98 RATIO — SIGNIFICANT CHANGE UP (ref 0.88–1.16)
KETONES UR-MCNC: NEGATIVE — SIGNIFICANT CHANGE UP
LEUKOCYTE ESTERASE UR-ACNC: NEGATIVE — SIGNIFICANT CHANGE UP
LYMPHOCYTES # BLD AUTO: 1.3 K/UL — SIGNIFICANT CHANGE UP (ref 1–3.3)
LYMPHOCYTES # BLD AUTO: 15.9 % — SIGNIFICANT CHANGE UP (ref 13–44)
MAGNESIUM SERPL-MCNC: 2 MG/DL — SIGNIFICANT CHANGE UP (ref 1.6–2.6)
MCHC RBC-ENTMCNC: 28.2 PG — SIGNIFICANT CHANGE UP (ref 27–34)
MCHC RBC-ENTMCNC: 32.7 GM/DL — SIGNIFICANT CHANGE UP (ref 32–36)
MCV RBC AUTO: 86.3 FL — SIGNIFICANT CHANGE UP (ref 80–100)
MONOCYTES # BLD AUTO: 0.52 K/UL — SIGNIFICANT CHANGE UP (ref 0–0.9)
MONOCYTES NFR BLD AUTO: 6.3 % — SIGNIFICANT CHANGE UP (ref 2–14)
NEUTROPHILS # BLD AUTO: 6.08 K/UL — SIGNIFICANT CHANGE UP (ref 1.8–7.4)
NEUTROPHILS NFR BLD AUTO: 74.1 % — SIGNIFICANT CHANGE UP (ref 43–77)
NITRITE UR-MCNC: NEGATIVE — SIGNIFICANT CHANGE UP
NRBC # BLD: 0 /100 WBCS — SIGNIFICANT CHANGE UP (ref 0–0)
NT-PROBNP SERPL-SCNC: 58 PG/ML — SIGNIFICANT CHANGE UP (ref 0–300)
PH UR: 7 — SIGNIFICANT CHANGE UP (ref 5–8)
PLATELET # BLD AUTO: 271 K/UL — SIGNIFICANT CHANGE UP (ref 150–400)
POTASSIUM SERPL-MCNC: 4.3 MMOL/L — SIGNIFICANT CHANGE UP (ref 3.5–5.3)
POTASSIUM SERPL-SCNC: 4.3 MMOL/L — SIGNIFICANT CHANGE UP (ref 3.5–5.3)
PROT SERPL-MCNC: 7.5 G/DL — SIGNIFICANT CHANGE UP (ref 6–8.3)
PROT UR-MCNC: NEGATIVE — SIGNIFICANT CHANGE UP
PROTHROM AB SERPL-ACNC: 11.8 SEC — SIGNIFICANT CHANGE UP (ref 10.6–13.6)
RBC # BLD: 4.68 M/UL — SIGNIFICANT CHANGE UP (ref 4.2–5.8)
RBC # FLD: 14.3 % — SIGNIFICANT CHANGE UP (ref 10.3–14.5)
RBC CASTS # UR COMP ASSIST: 0 /HPF — SIGNIFICANT CHANGE UP (ref 0–4)
SARS-COV-2 RNA SPEC QL NAA+PROBE: SIGNIFICANT CHANGE UP
SODIUM SERPL-SCNC: 140 MMOL/L — SIGNIFICANT CHANGE UP (ref 135–145)
SP GR SPEC: 1.03 — HIGH (ref 1.01–1.02)
TROPONIN T, HIGH SENSITIVITY RESULT: 14 NG/L — SIGNIFICANT CHANGE UP (ref 0–51)
TROPONIN T, HIGH SENSITIVITY RESULT: 15 NG/L — SIGNIFICANT CHANGE UP (ref 0–51)
UROBILINOGEN FLD QL: NEGATIVE — SIGNIFICANT CHANGE UP
WBC # BLD: 8.2 K/UL — SIGNIFICANT CHANGE UP (ref 3.8–10.5)
WBC # FLD AUTO: 8.2 K/UL — SIGNIFICANT CHANGE UP (ref 3.8–10.5)
WBC UR QL: 0 /HPF — SIGNIFICANT CHANGE UP (ref 0–5)

## 2021-02-01 PROCEDURE — 71046 X-RAY EXAM CHEST 2 VIEWS: CPT | Mod: 26

## 2021-02-01 PROCEDURE — 93010 ELECTROCARDIOGRAM REPORT: CPT

## 2021-02-01 PROCEDURE — 99220: CPT

## 2021-02-01 RX ORDER — INSULIN LISPRO 100/ML
20 VIAL (ML) SUBCUTANEOUS
Refills: 0 | Status: DISCONTINUED | OUTPATIENT
Start: 2021-02-01 | End: 2021-02-05

## 2021-02-01 RX ORDER — GLUCAGON INJECTION, SOLUTION 0.5 MG/.1ML
1 INJECTION, SOLUTION SUBCUTANEOUS ONCE
Refills: 0 | Status: DISCONTINUED | OUTPATIENT
Start: 2021-02-01 | End: 2021-02-05

## 2021-02-01 RX ORDER — ATORVASTATIN CALCIUM 80 MG/1
40 TABLET, FILM COATED ORAL AT BEDTIME
Refills: 0 | Status: DISCONTINUED | OUTPATIENT
Start: 2021-02-01 | End: 2021-02-05

## 2021-02-01 RX ORDER — FUROSEMIDE 40 MG
20 TABLET ORAL DAILY
Refills: 0 | Status: DISCONTINUED | OUTPATIENT
Start: 2021-02-01 | End: 2021-02-05

## 2021-02-01 RX ORDER — INSULIN LISPRO 100/ML
VIAL (ML) SUBCUTANEOUS
Refills: 0 | Status: DISCONTINUED | OUTPATIENT
Start: 2021-02-01 | End: 2021-02-05

## 2021-02-01 RX ORDER — SODIUM CHLORIDE 9 MG/ML
1000 INJECTION, SOLUTION INTRAVENOUS
Refills: 0 | Status: DISCONTINUED | OUTPATIENT
Start: 2021-02-01 | End: 2021-02-05

## 2021-02-01 RX ORDER — DEXTROSE 50 % IN WATER 50 %
25 SYRINGE (ML) INTRAVENOUS ONCE
Refills: 0 | Status: DISCONTINUED | OUTPATIENT
Start: 2021-02-01 | End: 2021-02-05

## 2021-02-01 RX ORDER — INSULIN GLARGINE 100 [IU]/ML
55 INJECTION, SOLUTION SUBCUTANEOUS AT BEDTIME
Refills: 0 | Status: DISCONTINUED | OUTPATIENT
Start: 2021-02-01 | End: 2021-02-05

## 2021-02-01 RX ORDER — DEXTROSE 50 % IN WATER 50 %
15 SYRINGE (ML) INTRAVENOUS ONCE
Refills: 0 | Status: DISCONTINUED | OUTPATIENT
Start: 2021-02-01 | End: 2021-02-05

## 2021-02-01 RX ORDER — DEXTROSE 50 % IN WATER 50 %
12.5 SYRINGE (ML) INTRAVENOUS ONCE
Refills: 0 | Status: DISCONTINUED | OUTPATIENT
Start: 2021-02-01 | End: 2021-02-05

## 2021-02-01 RX ORDER — INSULIN LISPRO 100/ML
VIAL (ML) SUBCUTANEOUS AT BEDTIME
Refills: 0 | Status: DISCONTINUED | OUTPATIENT
Start: 2021-02-01 | End: 2021-02-05

## 2021-02-01 RX ORDER — LISINOPRIL 2.5 MG/1
10 TABLET ORAL DAILY
Refills: 0 | Status: DISCONTINUED | OUTPATIENT
Start: 2021-02-01 | End: 2021-02-05

## 2021-02-01 RX ORDER — ASPIRIN/CALCIUM CARB/MAGNESIUM 324 MG
81 TABLET ORAL DAILY
Refills: 0 | Status: DISCONTINUED | OUTPATIENT
Start: 2021-02-01 | End: 2021-02-05

## 2021-02-01 RX ADMIN — INSULIN GLARGINE 55 UNIT(S): 100 INJECTION, SOLUTION SUBCUTANEOUS at 22:37

## 2021-02-01 RX ADMIN — ATORVASTATIN CALCIUM 40 MILLIGRAM(S): 80 TABLET, FILM COATED ORAL at 22:56

## 2021-02-01 NOTE — ED CDU PROVIDER DISPOSITION NOTE - CLINICAL COURSE
66yo M h.o HTN/hld, CAD s/p CABG in 2014 for triple vessel disease, DMII, SHAYY, COPD not on home O2, presenting to ED for non-radiating exertional L chest pain. Heavy in character a/w sob, alleviated with rest, no other modifying factors. Denies n/v, diaphoresis, L arm numbness or tingling, change in vision or fainting. Feels similar to previous anginal episodes. Has had exertional chest pain over past 5 months, negative stress test last admission for similar issue 5 months ago. ROS otherwise negative.  In ED, patient had ekg no signs of acute ischemia, troponin 14--15, chest x ray no signs of acute pathology, COVID negative. ED discussed case w/ Patient's cardiologist, Dr. Mccurdy who recommended CDU for frequent reeval, vitals q 4hrs, telemetry monitoring. 66yo M h.o HTN/hld, CAD s/p CABG in 2014 for triple vessel disease, DMII, SHAYY, COPD not on home O2, presenting to ED for non-radiating exertional L chest pain. Heavy in character a/w sob, alleviated with rest, no other modifying factors. Denies n/v, diaphoresis, L arm numbness or tingling, change in vision or fainting. Feels similar to previous anginal episodes. Has had exertional chest pain over past 5 months, negative stress test last admission for similar issue 5 months ago. ROS otherwise negative.  In ED, patient had ekg no signs of acute ischemia, troponin 14--15, chest x ray no signs of acute pathology, COVID negative. ED discussed case w/ Patient's cardiologist, Dr. Mccurdy who recommended CDU for frequent reeval, vitals q 4hrs, telemetry monitoring.  In CDU, no events on telemetry. Pt with no return of CP/SOB. Pt seen by his cardiologist in AM and started on Imdur 30mg per Dr. Mccurdy's recommendations. Stable for outpatient f/u.

## 2021-02-01 NOTE — ED CDU PROVIDER DISPOSITION NOTE - NSFOLLOWUPINSTRUCTIONS_ED_ALL_ED_FT
Rest. Stay well hydrated.   Please start Imdur 30mg daily as recommended by your cardiologist.   Take all of your other medications as previously prescribed.     Follow up with your Primary Care Provider and Cardiologist, Dr. Mccurdy, within 48-72 hours.   Show copies of your reports given to you.    Be sure to follow up your elevated A1C (uncontrolled diabetes).    Return to ER for any new/worsening or continued chest pain, shortness of breath, weakness, or any other concerning symptoms.     -------------------------------    Chest Pain    WHAT YOU NEED TO KNOW:    Chest pain can be caused by a range of conditions, from not serious to life-threatening. Chest pain can be a symptom of a digestive problem, such as acid reflux or a stomach ulcer. An anxiety attack or a strong emotion, such as anger, can also cause chest pain. Infection, inflammation, or a fracture in the bones or cartilage in your chest can cause pain or discomfort. Sometimes chest pain or pressure is caused by poor blood flow to your heart (angina). Chest pain may also be caused by life-threatening conditions such as a heart attack or blood clot in your lungs.    DISCHARGE INSTRUCTIONS:    Call your local emergency number (911 in the [place]) or have someone call if:   •You have any of the following signs of a heart attack: ?Squeezing, pressure, or pain in your chest    ?You may also have any of the following: ?Discomfort or pain in your back, neck, jaw, stomach, or arm    ?Shortness of breath    ?Nausea or vomiting    ?Lightheadedness or a sudden cold sweat      Seek care immediately if:   •You have chest discomfort that gets worse, even with medicine.    •You cough or vomit blood.    •Your bowel movements are black or bloody.    •You cannot stop vomiting, or it hurts to swallow.    Call your doctor if:   •You have questions or concerns about your condition or care.    Medicines:   •Medicines may be given to treat the cause of your chest pain. Examples include pain medicine, anxiety medicine, or medicines to increase blood flow to your heart.      •Do not take certain medicines without asking your healthcare provider first. These include NSAIDs, herbal or vitamin supplements, or hormones (estrogen or progestin).      •Take your medicine as directed. Contact your healthcare provider if you think your medicine is not helping or if you have side effects. Tell him or her if you are allergic to any medicine. Keep a list of the medicines, vitamins, and herbs you take. Include the amounts, and when and why you take them. Bring the list or the pill bottles to follow-up visits. Carry your medicine list with you in case of an emergency.      Healthy living tips: The following are general healthy guidelines. If the cause of your chest pain is known, your healthcare provider will give you specific guidelines to follow.  •Do not smoke. Nicotine and other chemicals in cigarettes and cigars can cause lung and heart damage. Ask your healthcare provider for information if you currently smoke and need help to quit. E-cigarettes or smokeless tobacco still contain nicotine. Talk to your healthcare provider before you use these products.      •Choose a variety of healthy foods as often as possible. Include fresh, frozen, or canned fruits and vegetables. Also include low-fat dairy products, fish, chicken (without skin), and lean meats. Your healthcare provider or a dietitian can help you create meal plans. You may need to avoid certain foods or drinks if your pain is caused by a digestion problem.       •Lower your sodium (salt) intake. Limit foods that are high in sodium, such as canned foods, salty snacks, and cold cuts. If you add salt when you cook food, do not add more at the table. Choose low-sodium canned foods as much as possible.    •Drink plenty of water every day. Water helps your body to control temperature and blood pressure. Ask your healthcare provider how much water you should drink every day.    •Ask about activity. Your healthcare provider will tell you which activities to limit or avoid. Ask when you can drive, return to work, and have sex. Ask about the best exercise plan for you.    •Maintain a healthy weight. Ask your healthcare provider what a healthy weight is for you. Ask him or her to help you create a safe weight loss plan if you are overweight.    •Ask about vaccines you may need. Get the influenza (flu) vaccine every year as soon as recommended, usually in September or October. You may also need a pneumococcal vaccine to prevent pneumonia. The vaccine is usually given every 5 years, starting at age 65. Your healthcare provider can tell you if should get other vaccines, and when to get them.    Follow up with your healthcare provider within 72 hours, or as directed: You may need to return for more tests to find the cause of your chest pain. You may be referred to a specialist, such as a cardiologist or gastroenterologist. Write down your questions so you remember to ask them during your visits.

## 2021-02-01 NOTE — ED CDU PROVIDER INITIAL DAY NOTE - OBJECTIVE STATEMENT
66yo M h.o HTN/hld, CAD s/p CABG in 2014 for triple vessel disease, DMII, SHAYY, COPD not on home O2, presenting to ED for non-radiating exertional L chest pain. Heavy in character a/w sob, alleviated with rest, no other modifying factors. Denies n/v, diaphoresis, L arm numbness or tingling, change in vision or fainting. Feels similar to previous anginal episodes. Has had exertional chest pain over past 5 months, negative stress test last admission for similar issue 5 months ago. ROS otherwise negative.  In ED, patient had ekg no signs of acute ischemia, troponin 14--15, chest x ray no signs of acute pathology, COVID negative. ED discussed case w/ Patient's cardiologist, Dr. Mccurdy who recommended CDU for frequent reeval, vitals q 4hrs, telemetry monitoring.

## 2021-02-01 NOTE — ED CDU PROVIDER DISPOSITION NOTE - PATIENT PORTAL LINK FT
You can access the FollowMyHealth Patient Portal offered by [facility] by registering at the following website: [email or web address]. By joining [employer]’s FollowMyHealth portal, you will also be able to view your health information using other applications (apps) compatible with our system.

## 2021-02-01 NOTE — ED ADULT NURSE NOTE - OBJECTIVE STATEMENT
65 year old A&Ox3 male BIB EMS from home for CP x 1 hr. PMH CAD s/p CABG, HTN, DM2, COPD, diabetic neuropathy. Per patient he was walking when he noticed this sharp squeezing left sided chest pain, which subsides during rest. Pt states he felt SOB during this. Arrives in sinus tach, tachypneic with O2 sat 93% on room air, left upper lobe diminished , left lower and right lung clear. +smoker, 3-4 cigarettes a day. Received 2 SL nitro tabs and 324mg ASA by EMS, states improvement in CP.  CM applied, EKG done. No sick contacts. Denies , n/v/d, fevers, chills, abdominal pain, urinary symptoms, weakness, fatigue, numbness, tingling in upper and lower extremities, HA, blurry vision. Updated on plan of care.

## 2021-02-01 NOTE — ED PROVIDER NOTE - PATIENT PORTAL LINK FT
You can access the FollowMyHealth Patient Portal offered by [facility] by registering at the following website: [email or web address]. By joining [employer]’s FollowMyHealth portal, you will also be able to view your health information using other applications (apps) compatible with our system.

## 2021-02-01 NOTE — ED PROVIDER NOTE - PROGRESS NOTE DETAILS
Maricarmen Fields PGY-1: Pt signed out to me pending contact with cardiologist re DC vs CDU for observation. 3 EKGs negative for serial changes. Trop 14 -->15. Maricarmen Fields PGY-1: Spoke with Dr. Mccurdy (cards). Discussed case. Recommends CDU and he will evaluate patient tomorrow morning. Maricarmen Fields PGY-1: CDU to see patient.

## 2021-02-01 NOTE — ED PROVIDER NOTE - OBJECTIVE STATEMENT
66yo M h.o HTN/hld, CAD s/p CABG in 2014 for triple vessel disease, DMII, SHAYY, COPD not on home O2, presenting to ED for non-radiating exertional L chest pain. Heavy in character a/w sob, alleviated with rest, no other modifying factors. Denies n/v, diaphoresis, L arm numbness or tingling, change in vision or fainting. Feels similar to previous anginal episodes. Has had exertional chest pain over past 5 months, negative stress test last admission for similar issue 5 months ago. ROS otherwise negative.

## 2021-02-01 NOTE — ED ADULT NURSE NOTE - NSIMPLEMENTINTERV_GEN_ALL_ED
Implemented All Universal Safety Interventions:  [place] to call system. Call bell, personal items and telephone within reach. Instruct patient to call for assistance. Room bathroom lighting operational. Non-slip footwear when patient is off stretcher. Physically safe environment: no spills, clutter or unnecessary equipment. Stretcher in lowest position, wheels locked, appropriate side rails in place.

## 2021-02-01 NOTE — ED ADULT NURSE NOTE - CAS EDN DISCHARGE ASSESSMENT
Alert and oriented to person, place and time/Patient baseline mental status/Awake Alert and oriented to person, place and time/Awake/Symptoms improved

## 2021-02-01 NOTE — ED PROVIDER NOTE - ATTENDING CONTRIBUTION TO CARE
------------ATTENDING NOTE------------   pt c/o chest pain, describes 1 hr of constant moderate L sided chest ache, slight shortness of breath, given  mg and NTG sublingual x2 by EMS, overall well appearing, clear chest w/o distress, equal distal pulses, no edema/calf tenderness, has been compliant w/ medications, no abd pain / nausea, awaiting labs/imaging and close reassessments -->  - Chase Tse MD    --------------------------------------------- ------------ATTENDING NOTE------------   pt c/o chest pain, describes 1 hr of constant moderate L sided chest ache, slight shortness of breath, given  mg and NTG sublingual x2 by EMS, overall well appearing, clear chest w/o distress, equal distal pulses, no edema/calf tenderness, has been compliant w/ medications, no abd pain / nausea, awaiting labs/imaging and close reassessments --> HD stable, labs/imaging wnl, d/w pt's Cardiologist, plan for CDU for cardiac monitoring, repeat cardiac markers, echo in AM.  - Chase Tse MD    ---------------------------------------------

## 2021-02-01 NOTE — ED CDU PROVIDER INITIAL DAY NOTE - PROGRESS NOTE DETAILS
CDU PROGRESS NOTE ANDERSON MEJIA: Pt resting comfortably, feeling well without complaint, denies chest pain. c/d/w Dr. Tse and ED team, Recommendations per Dr. Mccurdy are CDU for tele, no echo and he will eval in am.

## 2021-02-01 NOTE — ED CDU PROVIDER DISPOSITION NOTE - ATTENDING CONTRIBUTION TO CARE
Attending MD Hooper:   I personally have seen and examined this patient.  Physician assistant note reviewed and agree on plan of care and except where noted.  See below for details.    Seen in CDU 3  Cards Dr. Mccurdy    65M with PMH/PSH including HTN, HLD, DM, CAD s/p CABG 3v (2014), COPD, SHAYY sent to the CDU after presenting to the ED with non radiating, exertional L sided chest pain.  In ED, EKG without ischemia, Trop no significant delta, CXR with mild L atelectasis, COVID negative, HbA1c 9.5.  Patient sent to CDU for telemonitoring and evaluation by Dr. Mccurdy.  Patient without tele events overnight.  Patient seen by Dr. Mccurdy this morning, recommending no additional cardiac testing at this time and Indur 30 daily with follow up.      TO BE COMPLETED Attending MD Hooper:   I personally have seen and examined this patient.  Physician assistant note reviewed and agree on plan of care and except where noted.  See below for details.    Seen in CDU 3  Cards Dr. Mccurdy    65M with PMH/PSH including HTN, HLD, DM, CAD s/p CABG 3v (2014), COPD, SHAYY sent to the CDU after presenting to the ED with non radiating, exertional L sided chest pain.  In ED, EKG without ischemia, Trop no significant delta, CXR with mild L atelectasis, COVID negative, HbA1c 9.5.  Patient sent to CDU for telemonitoring and evaluation by Dr. Mccurdy.  Patient without tele events overnight.  Patient seen by Dr. Mccurdy this morning, recommending no additional cardiac testing at this time and Indur 30 daily with follow up.  Reports has not had return of chest pain, shortness of breath.  Reports feeling improved.  Denies abdominal pain, nausea, vomiting, diarrhea, blood in stools. Denies dysuria, hematuria, change in urinary habits including frequency, urgency. Denies numbness, weakness or tingling in extremities. A ten (10) point review of systems was negative other than as stated in the HPI or elsewhere in the chart.     Exam:   General: NAD  HENT: head NCAT, airway patent with moist mucous membranes  Eyes: PERRL  Lungs: lungs CTAB with good inspiratory effort, no wheezing, no rhonchi, no rales  Cardiac: +S1S2, no m/r/g  GI: abdomen soft with +BS, NT  : no CVAT  MSK: FROM at no calf tenderness, swelling, erythema or warmth  Neuro: moving all extremities with 5/5 strength bilateral upper and lower extremities, sensory grossly intact, no gross neuro deficits  Psych: normal mood and affect     A/P: 65M with chest pain, now resolved, seen by cardiology in CDU, no tele events, will start on Imdur as per Dr. Mccurdy.  Patient re-evaluated and feeling improved.  No acute issues at  this time.  Lab and radiology tests reviewed with patient and/or family.  Patient stable for discharge. Follow up instructions given, importance of follow up emphasized, return to ED parameters reviewed and patient verbalized understanding.  All questions answered, all concerns addressed.

## 2021-02-02 VITALS
OXYGEN SATURATION: 94 % | TEMPERATURE: 98 F | RESPIRATION RATE: 18 BRPM | HEART RATE: 90 BPM | SYSTOLIC BLOOD PRESSURE: 118 MMHG | DIASTOLIC BLOOD PRESSURE: 76 MMHG

## 2021-02-02 LAB
A1C WITH ESTIMATED AVERAGE GLUCOSE RESULT: 9.5 % — HIGH (ref 4–5.6)
CHOLEST SERPL-MCNC: 144 MG/DL — SIGNIFICANT CHANGE UP
ESTIMATED AVERAGE GLUCOSE: 226 MG/DL — HIGH (ref 68–114)
GLUCOSE BLDC GLUCOMTR-MCNC: 154 MG/DL — HIGH (ref 70–99)
GLUCOSE BLDC GLUCOMTR-MCNC: 165 MG/DL — HIGH (ref 70–99)
HDLC SERPL-MCNC: 47 MG/DL — SIGNIFICANT CHANGE UP
LIPID PNL WITH DIRECT LDL SERPL: 81 MG/DL — SIGNIFICANT CHANGE UP
NON HDL CHOLESTEROL: 98 MG/DL — SIGNIFICANT CHANGE UP
TRIGL SERPL-MCNC: 84 MG/DL — SIGNIFICANT CHANGE UP

## 2021-02-02 PROCEDURE — G0378: CPT

## 2021-02-02 PROCEDURE — 85610 PROTHROMBIN TIME: CPT

## 2021-02-02 PROCEDURE — 99284 EMERGENCY DEPT VISIT MOD MDM: CPT | Mod: 25

## 2021-02-02 PROCEDURE — 99217: CPT

## 2021-02-02 PROCEDURE — 93005 ELECTROCARDIOGRAM TRACING: CPT

## 2021-02-02 PROCEDURE — U0005: CPT

## 2021-02-02 PROCEDURE — 82962 GLUCOSE BLOOD TEST: CPT

## 2021-02-02 PROCEDURE — 85730 THROMBOPLASTIN TIME PARTIAL: CPT

## 2021-02-02 PROCEDURE — 83735 ASSAY OF MAGNESIUM: CPT

## 2021-02-02 PROCEDURE — U0003: CPT

## 2021-02-02 PROCEDURE — 83036 HEMOGLOBIN GLYCOSYLATED A1C: CPT

## 2021-02-02 PROCEDURE — 81001 URINALYSIS AUTO W/SCOPE: CPT

## 2021-02-02 PROCEDURE — 80053 COMPREHEN METABOLIC PANEL: CPT

## 2021-02-02 PROCEDURE — 80061 LIPID PANEL: CPT

## 2021-02-02 PROCEDURE — 85025 COMPLETE CBC W/AUTO DIFF WBC: CPT

## 2021-02-02 PROCEDURE — 71046 X-RAY EXAM CHEST 2 VIEWS: CPT

## 2021-02-02 PROCEDURE — 83880 ASSAY OF NATRIURETIC PEPTIDE: CPT

## 2021-02-02 PROCEDURE — 84484 ASSAY OF TROPONIN QUANT: CPT

## 2021-02-02 RX ORDER — ISOSORBIDE MONONITRATE 60 MG/1
30 TABLET, EXTENDED RELEASE ORAL ONCE
Refills: 0 | Status: COMPLETED | OUTPATIENT
Start: 2021-02-02 | End: 2021-02-02

## 2021-02-02 RX ORDER — ISOSORBIDE MONONITRATE 60 MG/1
1 TABLET, EXTENDED RELEASE ORAL
Qty: 21 | Refills: 0
Start: 2021-02-02 | End: 2021-02-22

## 2021-02-02 RX ADMIN — ISOSORBIDE MONONITRATE 30 MILLIGRAM(S): 60 TABLET, EXTENDED RELEASE ORAL at 09:37

## 2021-02-02 RX ADMIN — LISINOPRIL 10 MILLIGRAM(S): 2.5 TABLET ORAL at 06:28

## 2021-02-02 RX ADMIN — Medication 20 UNIT(S): at 07:43

## 2021-02-02 RX ADMIN — Medication 1: at 07:43

## 2021-02-02 RX ADMIN — Medication 20 MILLIGRAM(S): at 06:28

## 2021-02-02 NOTE — CONSULT NOTE ADULT - SUBJECTIVE AND OBJECTIVE BOX
CHIEF COMPLAINT:Patient is a 65y old  Male who presents with a chief complaint of     HISTORY OF PRESENT ILLNESS:    65 male wit history as below presents with TRIPATHI and CP after walking now resolved   No chest pain, dyspnea, palpitation, or dizziness.     PAST MEDICAL & SURGICAL HISTORY:  Diabetic autonomic neuropathy associated with type 2 diabetes mellitus    CAD (coronary artery disease)    HTN (hypertension)    DM2 (diabetes mellitus, type 2)    COPD (chronic obstructive pulmonary disease)    S/P CABG x 2  Dr Ashley    H/O umbilical hernia repair  x2            MEDICATIONS:  aspirin enteric coated 81 milliGRAM(s) Oral daily  furosemide    Tablet 20 milliGRAM(s) Oral daily  lisinopril 10 milliGRAM(s) Oral daily            atorvastatin 40 milliGRAM(s) Oral at bedtime  dextrose 40% Gel 15 Gram(s) Oral once  dextrose 50% Injectable 25 Gram(s) IV Push once  dextrose 50% Injectable 12.5 Gram(s) IV Push once  dextrose 50% Injectable 25 Gram(s) IV Push once  glucagon  Injectable 1 milliGRAM(s) IntraMuscular once  insulin glargine Injectable (LANTUS) 55 Unit(s) SubCutaneous at bedtime  insulin lispro (ADMELOG) corrective regimen sliding scale   SubCutaneous three times a day before meals  insulin lispro (ADMELOG) corrective regimen sliding scale   SubCutaneous at bedtime  insulin lispro Injectable (ADMELOG) 20 Unit(s) SubCutaneous three times a day before meals    dextrose 5%. 1000 milliLiter(s) IV Continuous <Continuous>  dextrose 5%. 1000 milliLiter(s) IV Continuous <Continuous>      FAMILY HISTORY:  Family history of diabetes mellitus (Father)        Non-contributory    SOCIAL HISTORY:    No tobacco, drugs or etoh    Allergies    No Known Allergies    Intolerances    	    REVIEW OF SYSTEMS:  as above  The rest of the 14 points ROS reviewed and except above they are unremarkable.        PHYSICAL EXAM:  T(C): 36.8 (02-02-21 @ 07:29), Max: 37 (02-01-21 @ 16:20)  HR: 90 (02-02-21 @ 07:29) (86 - 111)  BP: 118/76 (02-02-21 @ 07:29) (96/60 - 133/78)  RR: 18 (02-02-21 @ 07:29) (14 - 25)  SpO2: 94% (02-02-21 @ 07:29) (93% - 96%)  Wt(kg): --  I&O's Summary      Appearance: Normal	  HEENT:   no gross abnormality   Cardiovascular: Normal S1 S2,    Murmur:   Neck: JVP normal  Respiratory: Lungs clear   Gastrointestinal:  Soft, Non-tender  Skin: normal   Neuro: No gross deficits.   Psychiatry:  Mood & affect flat  Ext: No edema    LABS/DATA:    TELEMETRY: sinus  	    ECG:  	   	  CARDIAC MARKERS:                        15 <<== 02-01-21 @ 18:10                14 <<== 02-01-21 @ 17:12                              13.2   8.20  )-----------( 271      ( 01 Feb 2021 17:12 )             40.4     02-01    140  |  103  |  19  ----------------------------<  115<H>  4.3   |  26  |  1.00    Ca    9.7      01 Feb 2021 17:12  Mg     2.0     02-01    TPro  7.5  /  Alb  4.1  /  TBili  0.2  /  DBili  x   /  AST  26  /  ALT  25  /  AlkPhos  71  02-01    proBNP: Serum Pro-Brain Natriuretic Peptide: 58 pg/mL (02-01 @ 17:12)    Lipid Profile:   HgA1c:   TSH:

## 2021-02-02 NOTE — ED CDU PROVIDER SUBSEQUENT DAY NOTE - HISTORY
CDU PROGRESS NOTE PA JACKIE: Pt resting comfortably, feeling well without complaint. NAD, VSS. No events on telemetry.

## 2021-02-02 NOTE — CONSULT NOTE ADULT - ASSESSMENT
Chest pain   TRIPATHI   resolved   ? due to his copd   his recent cath was unremarkable and no intervention needed  no evidence of acute MI   will add imdur     CAD s/p cabg   cont asa, statin   plan as above    COPD   nebs  fu with pulm      HTN  stable     DMII  not well controlled  pt should follow up with endo for optimization

## 2021-02-02 NOTE — ED CDU PROVIDER SUBSEQUENT DAY NOTE - CPE EDP [facility] NORM
[facility]  Therapy Progress Note    Met with the patient per their request via (HIPPA compliant) Zoom video conference to accommodate state imposed restrictions on social contact due to the COVID-19 pandemic.    Patient Name: Mike Miller  Patient MRN: [id number]  Today's Date: 4/27/2020    Type of session:Individual psychotherapy  Length of session: 38 minutes  Persons in attendance:Patient    Subjective/New Info: The patient ID/Chief Complaint:  The patient is a 24 year old male, single, .  The patient was referred by psychiatry and voluntarily presented for an individual intake to address anxiety symptoms including chronic worrying and mild to moderate social anxiety with some evidence of vocal tic related to anxiety.  Reviewed limits of confidentiality and [facility] policies; patient expressed understanding and agreed to voluntarily proceed with evaluation and treatment.     Interval History:   Session Focus: The patient's estimated global assessment of functioning indicates a mild level of difficulty with some problems in relationships, work, or school functioning. The patient is nonetheless functioning well and has some significant relationships.  Patient is concerned about what is the next step with return to employment.  Use problem solving to identify a number resources.  The patient also reports using the nightmare protocol he had some success with decreased intensity and duration of nightmares.    Therapeutic Intervention: Cognitive Behavioral Therapy      Planned Intervention: examining automatic thought and using thought records    Objective/Observations:      Patient did not present in acute distress. Patient was appropriately groomed. Patient was alert and oriented x4. Eye contact was appropriate. No abnormalities in attention or concentration were noted. No abnormalities of movement present; psychomotor activity was normal. Speech was fluent and  regular in rhythm, rate, volume, and tone. Thought processes linear, logical and goal directed. There was no evidence of thought disorder. No auditory or visual hallucinations. Long and short term memory appeared to be intact. Insight, judgment, and impulse control were deemed to be good.  Reported mood was “euthymic.” Affect was full-ranging and appropriate to thought content and conversation.  Patient denied past and current suicidal and homicidal ideation in plan, intent, and preparatory behavior.         Diagnoses:   1. IRMA (generalized anxiety disorder)    2. Social anxiety disorder       The patient denied any suicide-related ideation and/or behaviors and intent/plan, denied thoughts about death and dying both in session and during the period since last appointment, or past 2 weeks.    Risk Level: Not Currently at Clinically Significant Risk  Hospitalization is not deemed necessary at this time as the patient does not present a clear or imminent danger to self or others. No indication for pursuing higher level of care. Outpatient management is currently most appropriate and least restrictive level of care.  Current risk:   SUICIDE: Low   Homicide: Not applicable   Self-harm: Not applicable   Relapse: Not applicable   Other:    Safety Plan reviewed? Not Indicated   If evidence of imminent risk is present, intervention/plan:         Treatment Goal(s)/Objective(s) addressed:    Anxiety   Goal: Develop strategies to reduce symptoms, or   Reduce anxiety and improve coping skills   Report feeling more positive about self and abilities during therapy sessions   Develop strategies for thought distraction when fixating on the future     Progress toward Treatment Goals: Moderate improvement    Plan:    1) The patient will return to the clinic 3-4 weeks.  2) Crisis Response Plan:  Reviewed emergency resources with the patient and the patient expressed understanding including:  If feeling suicidal, patient will call or  present to the [facility] during duty hours or present to closest ED ([facility] or [facility], call 911 or crisis hotline ([phone]) after duty hours.  3) Referrals/Consults:  N/A  4) Barriers to Learning:  No  5) Readiness to Learn:  Yes  6) Cultural Concerns:  No  7) Patient voiced understanding of, and agreement with, plan and goals as annotated above.  8) Declare these services are medically necessary and appropriate to the patient’s diagnosis and needs  9) The point of contact at the [facility] regarding this evaluation is Dr. Chavez, Psychologist.      Jordin Chavez III, Hilda.                                  normal...

## 2021-02-02 NOTE — ED ADULT NURSE REASSESSMENT NOTE - NS ED NURSE REASSESS COMMENT FT1
Received report @ 2300, pt lying in bed comfortably and denies any pain or discomfort. VS stable. Pt made aware of plan of care.
Received report from Morgan Colletta, RN. Patient resting comfortably in bed, denies pain or needs at this time. No acute distress noted, breathing unlabored and spontaneous, SPO2 94% on RA. Call bell within reach. Pt up to bathroom independently, gait steady, denies chest pain/dizziness/SOB.
07.00 Am Received the Pt from  AZEB Bennett . Pt is Observed for chest pain for cardiac monitoring  . Received the Pt A&OX 4 obeys commands Sharon N/V/D fever chills cp SOB   Comfort care & safety measures continued  IV site leaking Plan to change the IV site  .  Pt is advised to call for help  call bell with in the reach pt verbalized the understanding .    pending CDU  MD bowen . GCS 15/15 A&OX 4 PERRLA  size 3 Strong upper & lower extremities steady gait   No facial droop  No Hand Leg drop denies numbness tingling Continue to monitor

## 2021-02-02 NOTE — ED CDU PROVIDER SUBSEQUENT DAY NOTE - PROGRESS NOTE DETAILS
CDU PROGRESS NOTE ANDERSON MEJIA: No interval change from prior exam, Pt resting comfortably, NAD, VSS. No events on telemetry. Pt w/o complaints, will continue to monitor. Patient resting in bed comfortably in NAD. No complaints. Denies any CP or SOB. Most recent VSS. No events on telemetry monitor. Per signout, pending eval by Dr. Mccurdy this morning. Pt seen by Dr. Mccurdy, recommending Indur 30 daily and will f/u in office. Aware of uncontrolled DM Pt seen by Dr. Mccurdy, recommending Indur 30 daily and will f/u in office. Aware of uncontrolled DM. Pt and plan d/w Dr. Hooper, stable for d/c home. Pt and plan d/w gayle Gutierres for d/c home. Pt reports he has an endocrinologist and will f/u regarding elevated A1C. Reports most recent level 9.8. Will give copy of results to patient.

## 2021-02-02 NOTE — ED CDU PROVIDER SUBSEQUENT DAY NOTE - ATTENDING CONTRIBUTION TO CARE
Attending MD Hooper:   I personally have seen and examined this patient.  Physician assistant note reviewed and agree on plan of care and except where noted.  See below for details.    Seen in CDU 3  Cards Dr. Mccurdy    65M with PMH/PSH including HTN, HLD, DM, CAD s/p CABG 3v (2014), COPD, SHAYY sent to the CDU after presenting to the ED with non radiating, exertional L sided chest pain.  In ED, EKG without ischemia, Trop no significant delta, CXR with mild L atelectasis, COVID negative, HbA1c 9.5.  Patient sent to CDU for telemonitoring and evaluation by Dr. Mccurdy.  Patient without tele events overnight.  Patient seen by Dr. Mccurdy this morning, recommending no additional cardiac testing at this time and Indur 30 daily with follow up.  Reports has not had return of chest pain, shortness of breath.  Reports feeling improved.  Denies abdominal pain, nausea, vomiting, diarrhea, blood in stools. Denies dysuria, hematuria, change in urinary habits including frequency, urgency. Denies numbness, weakness or tingling in extremities. A ten (10) point review of systems was negative other than as stated in the HPI or elsewhere in the chart.     Exam:   General: NAD  HENT: head NCAT, airway patent with moist mucous membranes  Eyes: PERRL  Lungs: lungs CTAB with good inspiratory effort, no wheezing, no rhonchi, no rales  Cardiac: +S1S2, no m/r/g  GI: abdomen soft with +BS, NT  : no CVAT  MSK: FROM at no calf tenderness, swelling, erythema or warmth  Neuro: moving all extremities with 5/5 strength bilateral upper and lower extremities, sensory grossly intact, no gross neuro deficits  Psych: normal mood and affect     A/P: 65M with chest pain, now resolved, seen by cardiology in CDU, no tele events, will start on Imdur as per Dr. Mccurdy.  Patient re-evaluated and feeling improved.  No acute issues at  this time.  Lab and radiology tests reviewed with patient and/or family.  Patient stable for discharge. Follow up instructions given, importance of follow up emphasized, return to ED parameters reviewed and patient verbalized understanding.  All questions answered, all concerns addressed.

## 2021-02-03 ENCOUNTER — NON-APPOINTMENT (OUTPATIENT)
Age: 66
End: 2021-02-03

## 2021-02-04 ENCOUNTER — APPOINTMENT (OUTPATIENT)
Dept: INTERNAL MEDICINE | Facility: CLINIC | Age: 66
End: 2021-02-04
Payer: MEDICAID

## 2021-02-04 VITALS
BODY MASS INDEX: 40.46 KG/M2 | WEIGHT: 285.8 LBS | TEMPERATURE: 96.8 F | OXYGEN SATURATION: 95 % | HEIGHT: 70.47 IN | DIASTOLIC BLOOD PRESSURE: 81 MMHG | SYSTOLIC BLOOD PRESSURE: 124 MMHG | HEART RATE: 109 BPM

## 2021-02-04 DIAGNOSIS — K59.00 CONSTIPATION, UNSPECIFIED: ICD-10-CM

## 2021-02-04 DIAGNOSIS — D72.829 ELEVATED WHITE BLOOD CELL COUNT, UNSPECIFIED: ICD-10-CM

## 2021-02-04 PROCEDURE — 99072 ADDL SUPL MATRL&STAF TM PHE: CPT

## 2021-02-04 PROCEDURE — 99214 OFFICE O/P EST MOD 30 MIN: CPT | Mod: 25

## 2021-02-09 NOTE — HISTORY OF PRESENT ILLNESS
[FreeTextEntry1] : Patient presents to the office for follow-up [de-identified] : Went to the emergency room yesterday was having some chest pain, chest pain was in the center of the chest likely pressure will be following up with cardiologist.  Has been watching diet and had insulin increased.  Denies any fevers chills night sweats has been more constipated recently, passes stool 3-4 times a week stool is hard in nature.  Last colonoscopy was more than 5 years ago, denies any blood in the stool abdominal bloating tenesmus.  Denies any alternating bowel habits.

## 2021-02-09 NOTE — ASSESSMENT
[FreeTextEntry1] : We will repeat CBC for leukocytosis no lymphadenopathy and no hepatosplenomegaly on exam, consider checking flow cytometry if persistent.  To also check LDH uric acid levels further management of diabetes as per endocrinology advised to follow-up with cardiology for chest pain.  In regards to constipation, patient advised to follow-up with GI for colonoscopy advised fiber supplementation increase hydration activity and can take senna as needed to stimulate colon\par \par \par Patient medically optimized for dental extraction

## 2021-02-11 LAB
25(OH)D3 SERPL-MCNC: 24.2 NG/ML
ALBUMIN SERPL ELPH-MCNC: 4.8 G/DL
ALP BLD-CCNC: 83 U/L
ALT SERPL-CCNC: 29 U/L
ANION GAP SERPL CALC-SCNC: 17 MMOL/L
APPEARANCE: CLEAR
APPEARANCE: CLEAR
AST SERPL-CCNC: 23 U/L
BACTERIA: NEGATIVE
BASOPHILS # BLD AUTO: 0.07 K/UL
BASOPHILS NFR BLD AUTO: 0.7 %
BILIRUB SERPL-MCNC: 0.2 MG/DL
BILIRUBIN URINE: NEGATIVE
BILIRUBIN URINE: NEGATIVE
BLOOD URINE: NEGATIVE
BLOOD URINE: NEGATIVE
BUN SERPL-MCNC: 15 MG/DL
CALCIUM SERPL-MCNC: 10.3 MG/DL
CHLORIDE SERPL-SCNC: 101 MMOL/L
CHOLEST SERPL-MCNC: 145 MG/DL
CO2 SERPL-SCNC: 24 MMOL/L
COLOR: NORMAL
COLOR: NORMAL
CREAT SERPL-MCNC: 0.97 MG/DL
EOSINOPHIL # BLD AUTO: 0.28 K/UL
EOSINOPHIL NFR BLD AUTO: 2.8 %
ESTIMATED AVERAGE GLUCOSE: 223 MG/DL
GLUCOSE QUALITATIVE U: ABNORMAL
GLUCOSE QUALITATIVE U: ABNORMAL
GLUCOSE SERPL-MCNC: 124 MG/DL
HBA1C MFR BLD HPLC: 9.4 %
HCT VFR BLD CALC: 46.9 %
HDLC SERPL-MCNC: 49 MG/DL
HGB BLD-MCNC: 14.1 G/DL
HYALINE CASTS: 0 /LPF
IMM GRANULOCYTES NFR BLD AUTO: 0.3 %
KETONES URINE: NEGATIVE
KETONES URINE: NEGATIVE
LDH SERPL-CCNC: 213 U/L
LDLC SERPL CALC-MCNC: 74 MG/DL
LEUKOCYTE ESTERASE URINE: NEGATIVE
LEUKOCYTE ESTERASE URINE: NEGATIVE
LYMPHOCYTES # BLD AUTO: 1.64 K/UL
LYMPHOCYTES NFR BLD AUTO: 16.6 %
MAN DIFF?: NORMAL
MCHC RBC-ENTMCNC: 27.3 PG
MCHC RBC-ENTMCNC: 30.1 GM/DL
MCV RBC AUTO: 90.9 FL
MICROSCOPIC-UA: NORMAL
MONOCYTES # BLD AUTO: 0.59 K/UL
MONOCYTES NFR BLD AUTO: 6 %
NEUTROPHILS # BLD AUTO: 7.24 K/UL
NEUTROPHILS NFR BLD AUTO: 73.6 %
NITRITE URINE: NEGATIVE
NITRITE URINE: NEGATIVE
NONHDLC SERPL-MCNC: 96 MG/DL
PH URINE: 6
PH URINE: 6
PLATELET # BLD AUTO: 314 K/UL
POTASSIUM SERPL-SCNC: 5.3 MMOL/L
PROT SERPL-MCNC: 7.7 G/DL
PROTEIN URINE: NEGATIVE
PROTEIN URINE: NEGATIVE
RBC # BLD: 5.16 M/UL
RBC # FLD: 14.4 %
RED BLOOD CELLS URINE: 1 /HPF
SODIUM SERPL-SCNC: 143 MMOL/L
SPECIFIC GRAVITY URINE: 1.03
SPECIFIC GRAVITY URINE: 1.03
SQUAMOUS EPITHELIAL CELLS: 0 /HPF
TRIGL SERPL-MCNC: 107 MG/DL
TSH SERPL-ACNC: 3.82 UIU/ML
UROBILINOGEN URINE: NORMAL
UROBILINOGEN URINE: NORMAL
WBC # FLD AUTO: 9.85 K/UL
WHITE BLOOD CELLS URINE: 0 /HPF

## 2021-02-19 ENCOUNTER — APPOINTMENT (OUTPATIENT)
Dept: INTERNAL MEDICINE | Facility: CLINIC | Age: 66
End: 2021-02-19
Payer: SELF-PAY

## 2021-02-19 VITALS
TEMPERATURE: 97.3 F | BODY MASS INDEX: 40.52 KG/M2 | DIASTOLIC BLOOD PRESSURE: 72 MMHG | WEIGHT: 283 LBS | HEART RATE: 129 BPM | SYSTOLIC BLOOD PRESSURE: 136 MMHG | HEIGHT: 70 IN | OXYGEN SATURATION: 95 % | RESPIRATION RATE: 14 BRPM

## 2021-02-19 PROCEDURE — 99499D: CUSTOM

## 2021-03-02 ENCOUNTER — APPOINTMENT (OUTPATIENT)
Dept: PULMONOLOGY | Facility: CLINIC | Age: 66
End: 2021-03-02
Payer: MEDICAID

## 2021-03-02 VITALS
SYSTOLIC BLOOD PRESSURE: 126 MMHG | OXYGEN SATURATION: 92 % | HEART RATE: 122 BPM | WEIGHT: 287 LBS | BODY MASS INDEX: 41.18 KG/M2 | DIASTOLIC BLOOD PRESSURE: 68 MMHG | TEMPERATURE: 96.8 F

## 2021-03-02 PROCEDURE — 99072 ADDL SUPL MATRL&STAF TM PHE: CPT

## 2021-03-02 PROCEDURE — 99213 OFFICE O/P EST LOW 20 MIN: CPT | Mod: 25

## 2021-03-02 PROCEDURE — 99406 BEHAV CHNG SMOKING 3-10 MIN: CPT

## 2021-03-02 RX ORDER — BUDESONIDE, GLYCOPYRROLATE, AND FORMOTEROL FUMARATE 160; 9; 4.8 UG/1; UG/1; UG/1
160-9-4.8 AEROSOL, METERED RESPIRATORY (INHALATION)
Qty: 1 | Refills: 3 | Status: DISCONTINUED | COMMUNITY
Start: 2020-12-01 | End: 2021-03-02

## 2021-03-14 NOTE — HISTORY OF PRESENT ILLNESS
[Current] : current [>= 30 pack years] : >= 30 pack years [TextBox_4] : 66 yo male with hx of OAD  and complaints consistent with sleep apnea, presents for follow up. The patient complains of PRN productive cough without SOB. He continues to smoke but "less'. He uses albuterol MDI PRN alone. He continues to have sleep related complaints. PSG was performed after last visit.  [TextBox_11] : 1 [TextBox_13] : 30 [Awakes Unrefreshed] : awakes unrefreshed [Daytime Somnolence] : daytime somnolence [Fatigue] : fatigue [Snoring] : snoring

## 2021-03-14 NOTE — COUNSELING
[Risk of tobacco use and health benefits of smoking cessation discussed] : Risk of tobacco use and health benefits of smoking cessation discussed [Cessation strategies including cessation program discussed] : Cessation strategies including cessation program discussed [Willing to Quit Smoking] : Willing to quit smoking [Tobacco Use Cessation Intermediate Greater Than 3 Minutes Up to 10 Minutes] : Tobacco Use Cessation Intermediate Greater Than 3 Minutes Up to 10 Minutes [FreeTextEntry1] : 12

## 2021-03-14 NOTE — DISCUSSION/SUMMARY
[FreeTextEntry1] : 66 yo male with OAD related complaints. Smoking cessation was stressed. He is to use beztri BID with PRN albuterol MDI. Treatment adjustment will depend on symptomatic needs. Annual screening chest CT in the future. A CPAP titration study will be performed.

## 2021-03-14 NOTE — REVIEW OF SYSTEMS
[Fatigue] : fatigue [Cough] : cough [Sputum] : sputum [Wheezing] : wheezing [Diabetes] : diabetes [Negative] : Psychiatric

## 2021-03-18 ENCOUNTER — APPOINTMENT (OUTPATIENT)
Dept: INTERNAL MEDICINE | Facility: CLINIC | Age: 66
End: 2021-03-18
Payer: SELF-PAY

## 2021-03-18 PROCEDURE — 99499D: CUSTOM

## 2021-04-14 ENCOUNTER — RX RENEWAL (OUTPATIENT)
Age: 66
End: 2021-04-14

## 2021-04-16 ENCOUNTER — RX RENEWAL (OUTPATIENT)
Age: 66
End: 2021-04-16

## 2021-04-23 ENCOUNTER — APPOINTMENT (OUTPATIENT)
Dept: INTERNAL MEDICINE | Facility: CLINIC | Age: 66
End: 2021-04-23
Payer: SELF-PAY

## 2021-04-23 PROCEDURE — 99499D: CUSTOM

## 2021-05-21 ENCOUNTER — APPOINTMENT (OUTPATIENT)
Dept: INTERNAL MEDICINE | Facility: CLINIC | Age: 66
End: 2021-05-21
Payer: SELF-PAY

## 2021-05-21 PROCEDURE — 99499D: CUSTOM

## 2021-05-22 ENCOUNTER — RX RENEWAL (OUTPATIENT)
Age: 66
End: 2021-05-22

## 2021-06-22 ENCOUNTER — APPOINTMENT (OUTPATIENT)
Dept: PULMONOLOGY | Facility: CLINIC | Age: 66
End: 2021-06-22
Payer: MEDICAID

## 2021-06-22 VITALS
WEIGHT: 278 LBS | DIASTOLIC BLOOD PRESSURE: 68 MMHG | SYSTOLIC BLOOD PRESSURE: 116 MMHG | BODY MASS INDEX: 39.89 KG/M2 | OXYGEN SATURATION: 92 % | TEMPERATURE: 96.9 F | HEART RATE: 111 BPM

## 2021-06-22 PROCEDURE — 94727 GAS DIL/WSHOT DETER LNG VOL: CPT

## 2021-06-22 PROCEDURE — 99213 OFFICE O/P EST LOW 20 MIN: CPT | Mod: 25

## 2021-06-22 PROCEDURE — 94729 DIFFUSING CAPACITY: CPT

## 2021-06-22 PROCEDURE — 94060 EVALUATION OF WHEEZING: CPT

## 2021-06-22 NOTE — HISTORY OF PRESENT ILLNESS
[Current] : current [>= 30 pack years] : >= 30 pack years [TextBox_4] : 66 yo male with hx of OAD and SHAYY presents for follow up. The patient continues to smoke but "less" , about 5 cigarettes daily. He claims to feel "better" with daily trelegy use and PRN albuterol MDI. He complains of PRN productive cough without chest pain or hemoptysis. A titration sleep study was performed recently. He continues to complain of sleep related problems. [TextBox_11] : 1 [TextBox_13] : 30 [Awakes Unrefreshed] : awakes unrefreshed [Awakes with Dry Mouth] : awakes with dry mouth [Daytime Somnolence] : daytime somnolence [Fatigue] : fatigue [Snoring] : snoring

## 2021-06-22 NOTE — DISCUSSION/SUMMARY
[FreeTextEntry1] : 66 yo male with OAD at baseline. Smoking cessation stressed. He is to continue trelegy daily with PRN albuterol MDI. A repeat screening chest CT will be performed in the future. I reviewed the titration sleep study results with the patient. Auto BIPAP will be prescribed. Diet and weight loss recommended. He is to follow up with his PMD as before.

## 2021-06-25 ENCOUNTER — APPOINTMENT (OUTPATIENT)
Dept: INTERNAL MEDICINE | Facility: CLINIC | Age: 66
End: 2021-06-25
Payer: SELF-PAY

## 2021-06-25 PROCEDURE — 99499D: CUSTOM

## 2021-07-12 ENCOUNTER — RX RENEWAL (OUTPATIENT)
Age: 66
End: 2021-07-12

## 2021-07-16 ENCOUNTER — RX RENEWAL (OUTPATIENT)
Age: 66
End: 2021-07-16

## 2021-07-22 ENCOUNTER — APPOINTMENT (OUTPATIENT)
Dept: INTERNAL MEDICINE | Facility: CLINIC | Age: 66
End: 2021-07-22
Payer: SELF-PAY

## 2021-07-22 PROCEDURE — 99499D: CUSTOM

## 2021-08-01 PROCEDURE — G9005: CPT

## 2021-08-24 ENCOUNTER — APPOINTMENT (OUTPATIENT)
Dept: INTERNAL MEDICINE | Facility: CLINIC | Age: 66
End: 2021-08-24
Payer: SELF-PAY

## 2021-08-24 PROCEDURE — 99499D: CUSTOM

## 2021-09-21 ENCOUNTER — APPOINTMENT (OUTPATIENT)
Dept: PULMONOLOGY | Facility: CLINIC | Age: 66
End: 2021-09-21

## 2021-09-24 ENCOUNTER — APPOINTMENT (OUTPATIENT)
Dept: INTERNAL MEDICINE | Facility: CLINIC | Age: 66
End: 2021-09-24
Payer: SELF-PAY

## 2021-09-24 PROCEDURE — 99499D: CUSTOM

## 2021-10-17 ENCOUNTER — RX RENEWAL (OUTPATIENT)
Age: 66
End: 2021-10-17

## 2022-02-25 NOTE — PATIENT PROFILE ADULT - NSPROSPIRITUALVALUESFT_GEN_A_NUR
IR team notified the patient may be discharged before Monday  Patient on the schedule as an inpatient for a biopsy Monday  Unfortunately this slot cannot be utilized for on outpatient procedure as the patient requires preop and post procedure recovery area    I have placed order for outpatient a biopsy, our scheduling team will begin to identify date and contact the patient      If patient remains in the hospital over the weekend make NPO Sunday night for add on Monday biopsy none

## 2022-03-31 ENCOUNTER — APPOINTMENT (OUTPATIENT)
Dept: INTERNAL MEDICINE | Facility: CLINIC | Age: 67
End: 2022-03-31
Payer: SELF-PAY

## 2022-03-31 DIAGNOSIS — N52.9 MALE ERECTILE DYSFUNCTION, UNSPECIFIED: ICD-10-CM

## 2022-03-31 PROCEDURE — 99499D: CUSTOM

## 2022-03-31 RX ORDER — SILDENAFIL 100 MG/1
100 TABLET, FILM COATED ORAL
Qty: 6 | Refills: 5 | Status: ACTIVE | COMMUNITY
Start: 2022-03-31 | End: 1900-01-01

## 2022-04-06 ENCOUNTER — TRANSCRIPTION ENCOUNTER (OUTPATIENT)
Age: 67
End: 2022-04-06

## 2022-04-06 ENCOUNTER — INPATIENT (INPATIENT)
Facility: HOSPITAL | Age: 67
LOS: 1 days | Discharge: HOME | DRG: 896 | End: 2022-04-08
Attending: INTERNAL MEDICINE | Admitting: INTERNAL MEDICINE
Payer: MEDICAID

## 2022-04-06 VITALS
RESPIRATION RATE: 18 BRPM | TEMPERATURE: 99 F | OXYGEN SATURATION: 94 % | DIASTOLIC BLOOD PRESSURE: 90 MMHG | HEIGHT: 71 IN | SYSTOLIC BLOOD PRESSURE: 148 MMHG | HEART RATE: 132 BPM

## 2022-04-06 DIAGNOSIS — Z98.89 OTHER SPECIFIED POSTPROCEDURAL STATES: Chronic | ICD-10-CM

## 2022-04-06 DIAGNOSIS — R07.9 CHEST PAIN, UNSPECIFIED: ICD-10-CM

## 2022-04-06 DIAGNOSIS — Z95.1 PRESENCE OF AORTOCORONARY BYPASS GRAFT: Chronic | ICD-10-CM

## 2022-04-06 LAB
ALBUMIN SERPL ELPH-MCNC: 4.3 G/DL — SIGNIFICANT CHANGE UP (ref 3.3–5)
ALP SERPL-CCNC: 81 U/L — SIGNIFICANT CHANGE UP (ref 40–120)
ALT FLD-CCNC: 27 U/L — SIGNIFICANT CHANGE UP (ref 10–45)
ANION GAP SERPL CALC-SCNC: 10 MMOL/L — SIGNIFICANT CHANGE UP (ref 5–17)
AST SERPL-CCNC: 22 U/L — SIGNIFICANT CHANGE UP (ref 10–40)
BASE EXCESS BLDV CALC-SCNC: 6.2 MMOL/L — HIGH (ref -2–2)
BASOPHILS # BLD AUTO: 0.06 K/UL — SIGNIFICANT CHANGE UP (ref 0–0.2)
BASOPHILS NFR BLD AUTO: 0.5 % — SIGNIFICANT CHANGE UP (ref 0–2)
BILIRUB SERPL-MCNC: 0.2 MG/DL — SIGNIFICANT CHANGE UP (ref 0.2–1.2)
BUN SERPL-MCNC: 12 MG/DL — SIGNIFICANT CHANGE UP (ref 7–23)
CA-I SERPL-SCNC: 1.22 MMOL/L — SIGNIFICANT CHANGE UP (ref 1.15–1.33)
CALCIUM SERPL-MCNC: 9.2 MG/DL — SIGNIFICANT CHANGE UP (ref 8.4–10.5)
CHLORIDE BLDV-SCNC: 98 MMOL/L — SIGNIFICANT CHANGE UP (ref 96–108)
CHLORIDE SERPL-SCNC: 98 MMOL/L — SIGNIFICANT CHANGE UP (ref 96–108)
CO2 BLDV-SCNC: 36 MMOL/L — HIGH (ref 22–26)
CO2 SERPL-SCNC: 30 MMOL/L — SIGNIFICANT CHANGE UP (ref 22–31)
CREAT SERPL-MCNC: 0.78 MG/DL — SIGNIFICANT CHANGE UP (ref 0.5–1.3)
EGFR: 100 ML/MIN/1.73M2 — SIGNIFICANT CHANGE UP
EOSINOPHIL # BLD AUTO: 0.1 K/UL — SIGNIFICANT CHANGE UP (ref 0–0.5)
EOSINOPHIL NFR BLD AUTO: 0.8 % — SIGNIFICANT CHANGE UP (ref 0–6)
FLUAV AG NPH QL: SIGNIFICANT CHANGE UP
FLUBV AG NPH QL: SIGNIFICANT CHANGE UP
GAS PNL BLDV: 135 MMOL/L — LOW (ref 136–145)
GAS PNL BLDV: SIGNIFICANT CHANGE UP
GAS PNL BLDV: SIGNIFICANT CHANGE UP
GLUCOSE BLDC GLUCOMTR-MCNC: 203 MG/DL — HIGH (ref 70–99)
GLUCOSE BLDC GLUCOMTR-MCNC: 239 MG/DL — HIGH (ref 70–99)
GLUCOSE BLDC GLUCOMTR-MCNC: 274 MG/DL — HIGH (ref 70–99)
GLUCOSE BLDV-MCNC: 217 MG/DL — HIGH (ref 70–99)
GLUCOSE SERPL-MCNC: 224 MG/DL — HIGH (ref 70–99)
HCO3 BLDV-SCNC: 34 MMOL/L — HIGH (ref 22–29)
HCT VFR BLD CALC: 40.8 % — SIGNIFICANT CHANGE UP (ref 39–50)
HCT VFR BLDA CALC: 40 % — SIGNIFICANT CHANGE UP (ref 39–51)
HGB BLD CALC-MCNC: 13.3 G/DL — SIGNIFICANT CHANGE UP (ref 12.6–17.4)
HGB BLD-MCNC: 13.2 G/DL — SIGNIFICANT CHANGE UP (ref 13–17)
IMM GRANULOCYTES NFR BLD AUTO: 0.5 % — SIGNIFICANT CHANGE UP (ref 0–1.5)
LACTATE BLDV-MCNC: 1.8 MMOL/L — SIGNIFICANT CHANGE UP (ref 0.7–2)
LYMPHOCYTES # BLD AUTO: 0.85 K/UL — LOW (ref 1–3.3)
LYMPHOCYTES # BLD AUTO: 6.4 % — LOW (ref 13–44)
MCHC RBC-ENTMCNC: 27.5 PG — SIGNIFICANT CHANGE UP (ref 27–34)
MCHC RBC-ENTMCNC: 32.4 GM/DL — SIGNIFICANT CHANGE UP (ref 32–36)
MCV RBC AUTO: 85 FL — SIGNIFICANT CHANGE UP (ref 80–100)
MONOCYTES # BLD AUTO: 0.58 K/UL — SIGNIFICANT CHANGE UP (ref 0–0.9)
MONOCYTES NFR BLD AUTO: 4.4 % — SIGNIFICANT CHANGE UP (ref 2–14)
NEUTROPHILS # BLD AUTO: 11.59 K/UL — HIGH (ref 1.8–7.4)
NEUTROPHILS NFR BLD AUTO: 87.4 % — HIGH (ref 43–77)
NRBC # BLD: 0 /100 WBCS — SIGNIFICANT CHANGE UP (ref 0–0)
NT-PROBNP SERPL-SCNC: 114 PG/ML — SIGNIFICANT CHANGE UP (ref 0–300)
PCO2 BLDV: 63 MMHG — HIGH (ref 42–55)
PH BLDV: 7.34 — SIGNIFICANT CHANGE UP (ref 7.32–7.43)
PLATELET # BLD AUTO: 263 K/UL — SIGNIFICANT CHANGE UP (ref 150–400)
PO2 BLDV: 39 MMHG — SIGNIFICANT CHANGE UP (ref 25–45)
POTASSIUM BLDV-SCNC: 4.4 MMOL/L — SIGNIFICANT CHANGE UP (ref 3.5–5.1)
POTASSIUM SERPL-MCNC: 4.3 MMOL/L — SIGNIFICANT CHANGE UP (ref 3.5–5.3)
POTASSIUM SERPL-SCNC: 4.3 MMOL/L — SIGNIFICANT CHANGE UP (ref 3.5–5.3)
PROT SERPL-MCNC: 7.4 G/DL — SIGNIFICANT CHANGE UP (ref 6–8.3)
RBC # BLD: 4.8 M/UL — SIGNIFICANT CHANGE UP (ref 4.2–5.8)
RBC # FLD: 14 % — SIGNIFICANT CHANGE UP (ref 10.3–14.5)
RSV RNA NPH QL NAA+NON-PROBE: SIGNIFICANT CHANGE UP
SAO2 % BLDV: 66.5 % — LOW (ref 67–88)
SARS-COV-2 RNA SPEC QL NAA+PROBE: SIGNIFICANT CHANGE UP
SODIUM SERPL-SCNC: 138 MMOL/L — SIGNIFICANT CHANGE UP (ref 135–145)
TROPONIN T, HIGH SENSITIVITY RESULT: 15 NG/L — SIGNIFICANT CHANGE UP (ref 0–51)
TROPONIN T, HIGH SENSITIVITY RESULT: 16 NG/L — SIGNIFICANT CHANGE UP (ref 0–51)
WBC # BLD: 13.25 K/UL — HIGH (ref 3.8–10.5)
WBC # FLD AUTO: 13.25 K/UL — HIGH (ref 3.8–10.5)

## 2022-04-06 PROCEDURE — 93306 TTE W/DOPPLER COMPLETE: CPT | Mod: 26

## 2022-04-06 PROCEDURE — 93010 ELECTROCARDIOGRAM REPORT: CPT

## 2022-04-06 PROCEDURE — 71045 X-RAY EXAM CHEST 1 VIEW: CPT | Mod: 26

## 2022-04-06 PROCEDURE — 99285 EMERGENCY DEPT VISIT HI MDM: CPT

## 2022-04-06 PROCEDURE — 71275 CT ANGIOGRAPHY CHEST: CPT | Mod: 26

## 2022-04-06 RX ORDER — INSULIN LISPRO 100/ML
VIAL (ML) SUBCUTANEOUS
Refills: 0 | Status: DISCONTINUED | OUTPATIENT
Start: 2022-04-06 | End: 2022-04-08

## 2022-04-06 RX ORDER — IPRATROPIUM/ALBUTEROL SULFATE 18-103MCG
3 AEROSOL WITH ADAPTER (GRAM) INHALATION ONCE
Refills: 0 | Status: COMPLETED | OUTPATIENT
Start: 2022-04-06 | End: 2022-04-06

## 2022-04-06 RX ORDER — FUROSEMIDE 40 MG
20 TABLET ORAL DAILY
Refills: 0 | Status: DISCONTINUED | OUTPATIENT
Start: 2022-04-06 | End: 2022-04-08

## 2022-04-06 RX ORDER — INSULIN LISPRO 100/ML
10 VIAL (ML) SUBCUTANEOUS
Refills: 0 | Status: DISCONTINUED | OUTPATIENT
Start: 2022-04-06 | End: 2022-04-07

## 2022-04-06 RX ORDER — SODIUM CHLORIDE 9 MG/ML
1000 INJECTION, SOLUTION INTRAVENOUS
Refills: 0 | Status: DISCONTINUED | OUTPATIENT
Start: 2022-04-06 | End: 2022-04-08

## 2022-04-06 RX ORDER — ASPIRIN/CALCIUM CARB/MAGNESIUM 324 MG
324 TABLET ORAL ONCE
Refills: 0 | Status: COMPLETED | OUTPATIENT
Start: 2022-04-06 | End: 2022-04-06

## 2022-04-06 RX ORDER — DEXTROSE 50 % IN WATER 50 %
25 SYRINGE (ML) INTRAVENOUS ONCE
Refills: 0 | Status: DISCONTINUED | OUTPATIENT
Start: 2022-04-06 | End: 2022-04-08

## 2022-04-06 RX ORDER — ASPIRIN/CALCIUM CARB/MAGNESIUM 324 MG
81 TABLET ORAL DAILY
Refills: 0 | Status: DISCONTINUED | OUTPATIENT
Start: 2022-04-06 | End: 2022-04-08

## 2022-04-06 RX ORDER — ALBUTEROL 90 UG/1
2 AEROSOL, METERED ORAL EVERY 6 HOURS
Refills: 0 | Status: DISCONTINUED | OUTPATIENT
Start: 2022-04-06 | End: 2022-04-08

## 2022-04-06 RX ORDER — LISINOPRIL 2.5 MG/1
10 TABLET ORAL DAILY
Refills: 0 | Status: DISCONTINUED | OUTPATIENT
Start: 2022-04-06 | End: 2022-04-08

## 2022-04-06 RX ORDER — SODIUM CHLORIDE 9 MG/ML
1000 INJECTION INTRAMUSCULAR; INTRAVENOUS; SUBCUTANEOUS ONCE
Refills: 0 | Status: COMPLETED | OUTPATIENT
Start: 2022-04-06 | End: 2022-04-06

## 2022-04-06 RX ORDER — DEXTROSE 50 % IN WATER 50 %
15 SYRINGE (ML) INTRAVENOUS ONCE
Refills: 0 | Status: DISCONTINUED | OUTPATIENT
Start: 2022-04-06 | End: 2022-04-08

## 2022-04-06 RX ORDER — INSULIN GLARGINE 100 [IU]/ML
30 INJECTION, SOLUTION SUBCUTANEOUS AT BEDTIME
Refills: 0 | Status: DISCONTINUED | OUTPATIENT
Start: 2022-04-06 | End: 2022-04-07

## 2022-04-06 RX ORDER — BUDESONIDE AND FORMOTEROL FUMARATE DIHYDRATE 160; 4.5 UG/1; UG/1
2 AEROSOL RESPIRATORY (INHALATION)
Refills: 0 | Status: DISCONTINUED | OUTPATIENT
Start: 2022-04-06 | End: 2022-04-08

## 2022-04-06 RX ORDER — ENOXAPARIN SODIUM 100 MG/ML
40 INJECTION SUBCUTANEOUS EVERY 24 HOURS
Refills: 0 | Status: DISCONTINUED | OUTPATIENT
Start: 2022-04-06 | End: 2022-04-08

## 2022-04-06 RX ORDER — DEXTROSE 50 % IN WATER 50 %
12.5 SYRINGE (ML) INTRAVENOUS ONCE
Refills: 0 | Status: DISCONTINUED | OUTPATIENT
Start: 2022-04-06 | End: 2022-04-08

## 2022-04-06 RX ORDER — ATORVASTATIN CALCIUM 80 MG/1
20 TABLET, FILM COATED ORAL AT BEDTIME
Refills: 0 | Status: DISCONTINUED | OUTPATIENT
Start: 2022-04-06 | End: 2022-04-07

## 2022-04-06 RX ORDER — GLUCAGON INJECTION, SOLUTION 0.5 MG/.1ML
1 INJECTION, SOLUTION SUBCUTANEOUS ONCE
Refills: 0 | Status: DISCONTINUED | OUTPATIENT
Start: 2022-04-06 | End: 2022-04-08

## 2022-04-06 RX ADMIN — Medication 81 MILLIGRAM(S): at 12:10

## 2022-04-06 RX ADMIN — Medication 324 MILLIGRAM(S): at 02:08

## 2022-04-06 RX ADMIN — Medication 2: at 16:45

## 2022-04-06 RX ADMIN — Medication 10 UNIT(S): at 11:57

## 2022-04-06 RX ADMIN — ENOXAPARIN SODIUM 40 MILLIGRAM(S): 100 INJECTION SUBCUTANEOUS at 11:56

## 2022-04-06 RX ADMIN — Medication 20 MILLIGRAM(S): at 21:07

## 2022-04-06 RX ADMIN — LISINOPRIL 10 MILLIGRAM(S): 2.5 TABLET ORAL at 21:07

## 2022-04-06 RX ADMIN — Medication 10 UNIT(S): at 16:45

## 2022-04-06 RX ADMIN — Medication 2: at 11:57

## 2022-04-06 RX ADMIN — SODIUM CHLORIDE 1000 MILLILITER(S): 9 INJECTION INTRAMUSCULAR; INTRAVENOUS; SUBCUTANEOUS at 02:09

## 2022-04-06 RX ADMIN — Medication 3 MILLILITER(S): at 02:09

## 2022-04-06 RX ADMIN — ATORVASTATIN CALCIUM 20 MILLIGRAM(S): 80 TABLET, FILM COATED ORAL at 21:08

## 2022-04-06 RX ADMIN — INSULIN GLARGINE 30 UNIT(S): 100 INJECTION, SOLUTION SUBCUTANEOUS at 21:07

## 2022-04-06 NOTE — ED PROVIDER NOTE - PHYSICAL EXAMINATION
GENERAL: Patient awake alert, appears intoxicated.  HEENT: NC/AT.  LUNGS: CTAB, no wheezes or crackles.  CARDIAC: RRR, no m/r/g.  ABDOMEN: Soft, NT, diffusely distended (body habitus), No rebound, guarding.  EXT: No edema. No calf tenderness.  MSK: No pain with movement, no deformities.  NEURO: A&Ox3. Moving all extremities.  SKIN: Warm and dry. No rash.  PSYCH: Normal affect. GENERAL: Patient awake alert, appears intoxicated.  HEENT: NC/AT.  LUNGS: Mild b/l wheezes.  CARDIAC: RRR, no m/r/g.  ABDOMEN: Soft, NT, diffusely distended (body habitus), No rebound, guarding.  EXT: No edema. No calf tenderness.  MSK: No pain with movement, no deformities.  NEURO: A&Ox3. Moving all extremities.  SKIN: Warm and dry. No rash.  PSYCH: Normal affect.

## 2022-04-06 NOTE — CONSULT NOTE ADULT - SUBJECTIVE AND OBJECTIVE BOX
CHIEF COMPLAINT:Patient is a 66y old  Male who presents with a chief complaint of chest pain (2022 08:04)      HISTORY OF PRESENT ILLNESS:    66 male with CAD s/p CABG  HTN, HLD, COPD p/w chest pain and SOB.  Pt. states he had two edible cookies for the first time and was became very drowsy.  Pt. then noticed he had a sudden onset chest pain that he describes as "someone pushing down on my chest."  now feels better  no dizziness  no palpitation   pos sob     PAST MEDICAL & SURGICAL HISTORY:  COPD (chronic obstructive pulmonary disease)    DM2 (diabetes mellitus, type 2)    HTN (hypertension)    CAD (coronary artery disease)    Diabetic autonomic neuropathy associated with type 2 diabetes mellitus    H/O umbilical hernia repair  x2    S/P CABG x 2  Dr Ashley            MEDICATIONS:  aspirin enteric coated 81 milliGRAM(s) Oral daily  furosemide    Tablet 20 milliGRAM(s) Oral daily  lisinopril 10 milliGRAM(s) Oral daily      ALBUTerol    90 MICROgram(s) HFA Inhaler 2 Puff(s) Inhalation every 6 hours PRN  budesonide 160 MICROgram(s)/formoterol 4.5 MICROgram(s) Inhaler 2 Puff(s) Inhalation two times a day        atorvastatin 20 milliGRAM(s) Oral at bedtime        FAMILY HISTORY:  Family history of diabetes mellitus (Father)        Non-contributory    SOCIAL HISTORY:    No tobacco, drugs or etoh    Allergies    No Known Allergies    Intolerances    	    REVIEW OF SYSTEMS:  as above  The rest of the 14 points ROS reviewed and except above they are unremarkable.        PHYSICAL EXAM:  T(C): 36.7 (22 @ 06:51), Max: 37.2 (22 @ 00:45)  HR: 108 (22 @ 06:51) (108 - 132)  BP: 102/68 (22 @ 06:51) (102/68 - 148/90)  RR: 19 (22 @ 06:51) (18 - 19)  SpO2: 95% (22 @ 06:51) (94% - 95%)  Wt(kg): --  I&O's Summary    JVP: Normal  Neck: supple  Lung: pos wheeze   CV: S1 S2 , Murmur:  Abd: soft  Ext: No edema  neuro: Awake / alert  Psych: flat affect  Skin: normal    LABS/DATA:    TELEMETRY: 	    ECG:  	sinus tachycardia    	  CARDIAC MARKERS:  < from: Cardiac Cath Lab - Adult (20 @ 08:54) >    [facility]  Department of Cardiology  [address] [place] [id number]  [phone]  Cath Lab Report -- Comprehensive Report  Patient: KAYLA LOU  Study date: 2020  Account number: [id number]  MR number: [id number]  : 1955  Gender: Male  Race: W  Case Physician(s):  Rakesh Wong MD  Referring Physician:  Michel Mccurdy M.D.  INDICATIONS: Unstable angina  PROCEDURE:  --  Left coronary angiography.  --  Right coronary angiography.  --  Bypass graftangiography.  --  Sonosite - Diagnostic.  TECHNIQUE: The risks and alternatives of the procedures and conscious  sedation were explained to the patient and informed consent was obtained.  Cardiac catheterization performed electively.  Local anesthetic given. Right femoral artery access. Left coronary artery  angiography. The vessel was injected utilizing a catheter. Right coronary  artery angiography. The vessel was injected utilizing a catheter. Graft  angiography. The vessel was injected utilizing a catheter. Sonosite -  Diagnostic. RADIATION EXPOSURE: 10.6 min.  CONTRAST GIVEN: Omnipaque 110 ml.  MEDICATIONS GIVEN: Midazolam, 1 mg, IV. Fentanyl, 50 mcg, IV. Fentanyl, 25  mcg, IV. Midazolam, 1 mg, IV. Fentanyl, 25 mcg, IV. Fentanyl, 50 mcg,IV.  Midazolam, 1 mg, IV.  CORONARY VESSELS: The coronary circulation is right dominant.  LM:   --  LM: Normal.  LAD:   --  Proximal LAD: There was a diffuse 90 % stenosis.  CX:   --  Circumflex: Normal.  --  Mid circumflex: There was a tubular 95 % stenosis.  --  OM1: Normal.  --  L AV groove: There was a diffuse 25 % stenosis.  RCA:   --  Proximal RCA: There was a diffuse 30 % stenosis.  --  Mid RCA: There was a diffuse 40 % stenosis.  --  Distal RCA: There was a diffuse 50 % stenosis.  --  RPDA: Normal.  --  RPLS: Normal.  GRAFTS:   --  Graft to the LAD: There is a LIMA graft that is bypassed to  the middle left anterior descending artery. The graft is patent with no  obstructive coronary artery disease.  There is a SVG to the first diagonal artery. The graft is patent. There is  10% obstructive disease in the proximal segment of the graft.  There is a SVG to the second obtuse marginal artery. The graft is patent.  There is 25% obstructive disease in the proximal segment of the graft.  COMPLICATIONS: There were no complications.  DIAGNOSTIC IMPRESSIONS: Severe two vessel obstructive coronary artery  disease  --left anterior descending artery  --left circumflex artery  Normal left main coronary artery  Patent LIMA to the left anterior descending artery  Mild disease in the proximal segment of the SVG to the 1st diagonal artery  Mild disease in the proximal segment of the SVG to the 2nd obtuse marginal  artery  Right dominant system  DIAGNOSTIC RECOMMENDATIONS: Keep right leg straight for 4 hours following  removal of sheath  Continue aggressive medical management  Findings discussed with Dr. Mccurdy  INTERVENTIONAL IMPRESSIONS: Severe two vessel obstructive coronary artery  disease  --left anterior descending artery  --left circumflex artery  Normal left main coronary artery  Patent LIMA to the left anterior descending artery  Mild disease in the proximal segment of the SVG to the 1st diagonal artery  Mild disease in the proximal segment of the SVG to the 2nd obtuse marginal  artery  Right dominant system  INTERVENTIONAL RECOMMENDATIONS: Keep right leg straight for 4 hours  following removal of sheath  Continue aggressive medical management    < end of copied text >                        16 <<== 22 @ 05:03                15 <<== 22 @ 02:05                              13.2   13.25 )-----------( 263      ( 2022 02:05 )             40.8         138  |  98  |  12  ----------------------------<  224<H>  4.3   |  30  |  0.78    Ca    9.2      2022 02:05    TPro  7.4  /  Alb  4.3  /  TBili  0.2  /  DBili  x   /  AST  22  /  ALT  27  /  AlkPhos  81  -    proBNP: Serum Pro-Brain Natriuretic Peptide: 114 pg/mL ( @ 02:05)    Lipid Profile:   HgA1c:   TSH:

## 2022-04-06 NOTE — DISCHARGE NOTE PROVIDER - NSDCCAREPROVSEEN_GEN_ALL_CORE_FT
Physical Therapy Evaluation    Patient Name:  Riana Kay   MRN:  [id number]    Recommendations:     Discharge Recommendations:  home, outpatient PT   Discharge Equipment Recommendations: none   Barriers to discharge: Decreased caregiver support at current functional level    Assessment:     Riana Kay is a 67 y.o. female admitted with a medical diagnosis of Primary osteoarthritis of right knee.  She presents with the following impairments/functional limitations:  weakness, impaired functional mobilty, impaired endurance, gait instability, pain, impaired balance, impaired self care skills, decreased ROM, decreased lower extremity function, edema, orthopedic precautions. Pt evaluated in PACU for discharge to home with assistance from . Pt tolerated PT session fair secondary to R knee pain, nausea and dizziness. Pt initially required Mod A for sit<>stand transfer with RW decreasing to Min A. She required min A to amb 14 ft with RW WBAT RLE with gait deviations of decreased step length with step to gait pattern, poor foot clearance during swing phase, slow viktor, impaired balance and assistance required for walker manipulation. Pt has step at entrance of house. Pt's  will not be able to safely assist pt at current functional level. Pt requested to spend the night in the hospital. Pt is okay to amb with RW with assistance of 1 person overnight.    Rehab Prognosis: Good; patient would benefit from acute skilled PT services to address these deficits and reach maximum level of function.    Recent Surgery: Procedure(s) (LRB):  ARTHROPLASTY, KNEE-SAME DAY PROTOCOL (Right) Day of Surgery    Plan:     During this hospitalization, patient to be seen daily to address the identified rehab impairments via gait training, therapeutic activities, therapeutic exercises and progress toward the following goals:    · Plan of Care Expires:  11/06/20    Subjective     Chief Complaint: Pain R  knee  Patient/Family Comments/goals: Wants to spend the night in the hospital tonight  Pain/Comfort:  · Pain Rating 1: 10/10  · Location - Side 1: Right  · Location - Orientation 1: generalized  · Location 1: knee  · Pain Addressed 1: Pre-medicate for activity, Reposition, Distraction  · Pain Rating Post-Intervention 1: 10/10    Patients cultural, spiritual, Taoism conflicts given the current situation: no    Living Environment:  Pt lives with  in a 1 story house with 1 step at entrance. Has walk in shower; was not driving.    Prior to admission, patients level of function was amb without AD.  Equipment used at home: bedside commode, walker, rolling.  DME owned (not currently used): none.  Upon discharge, patient will have assistance from  and sister.    Objective:     Communicated with RN, pt and pt's  prior to session.  Patient found in PACU on stretcher with RN present with peripheral IV, cryotherapy, FCD  upon PT entry to room. RN present during PT session and communicated pt performance to Dr Damon.    General Precautions: Standard, fall   Orthopedic Precautions:RLE weight bearing as tolerated   Braces: N/A     Exams:  · Cognitive Exam:  Patient is oriented to Person, Place, Time and Situation  · Gross Motor Coordination:  WFL  · Postural Exam:  Patient presented with the following abnormalities:    · -       stood with flexed posture initially which corrected with verbal cues  · RLE ROM: Deficits: knee flex and ext due to pain  · RLE Strength: functionally tested 3+/5  · LLE ROM: WFL  · LLE Strength: WFL    Functional Mobility:  · Bed Mobility:     · Supine to Sit: moderate assistance  · Sit to Supine: moderate assistance  · Transfers:     · Sit to Stand from edge of stretcher and w/c:   Mod A initially X 2 transfers with verbal and tactile cues for trunk, hip and knee ext to achieve upright posture  with RW decreasing to Min A on 3rd transfer  · Gait:  min A to amb 14 ft with RW  WBAT RLE with gait deviations of decreased step length with step to gait pattern, poor foot clearance during swing phase, slow viktor, impaired balance and assistance required for walker manipulation.     Therapeutic Activities and Exercises:   Patient and pt's  were educated in:  - PT role, Plan of Care and goals prior to discharge  -safety with transfers including hand placement  -gait sequencing and RW management  -polar ice use and management        AM-PAC 6 CLICK MOBILITY  Total Score:15     Patient left in PACU on stretcher with all lines intact and RN and  present.    GOALS:   Multidisciplinary Problems     Physical Therapy Goals        Problem: Physical Therapy Goal    Goal Priority Disciplines Outcome Goal Variances Interventions   Physical Therapy Goal     PT, PT/OT Ongoing, Progressing     Description: Goals to be met by: 10/08/2020     Patient will increase functional independence with mobility by performin. Supine to sit with Stand-by Assistance  2. Sit to stand transfer with Stand-by Assistance with RW  3. Gait  x 125 feet with Stand-by Assistance using Rolling Walker WBAT RLE   4. Ascend/Descend 6 inch curb step with Minimal Assistance using Rolling Walker.  5. Lower extremity exercise program x 20 reps per handout, with assistance as needed                     History:     Past Medical History:   Diagnosis Date    Abdominal pain, right upper quadrant 2014    Anxiety     AR (allergic rhinitis)     Atrophic gastritis without mention of hemorrhage 2012     Dx updated per 2019 IMO Load    Chronic fatigue syndrome 6/10/2012    Dizziness     Fatty liver     GERD (gastroesophageal reflux disease)     HTN (hypertension)     Hyperlipidemia     Memory loss     Osteopenia     PONV (postoperative nausea and vomiting)     Primary osteoarthritis of right knee 10/6/2020    S/P total hysterectomy     Sleep apnea        Past Surgical History:   Procedure Laterality Date     CHOLECYSTECTOMY      COLONOSCOPY N/A 1/17/2018    Procedure: COLONOSCOPY with Donnell;  Surgeon: Roland Jacobo MD;  Location: [facility] (4TH FLR);  Service: Endoscopy;  Laterality: N/A;    ELBOW ARTHROPLASTY Right 1/16/2019    Procedure: ARTHROPLASTY, ELBOW right radial head arthroplasty revision;  Surgeon: Katja Hubbard MD;  Location: [facility] OR 1ST FLR;  Service: Orthopedics;  Laterality: Right;  Anesthesia: General and Regional. Stretcher, hand pan 1 and pan 2, CALL RUCIH SANTAMARIA & Sol notified 1-14 LO    ELBOW SURGERY Right 7/16/15    ELBOW SURGERY      ESOPHAGOGASTRODUODENOSCOPY N/A 4/15/2019    Procedure: EGD (ESOPHAGOGASTRODUODENOSCOPY);  Surgeon: Buck Irwin MD;  Location: [facility] ENDO (4TH FLR);  Service: Endoscopy;  Laterality: N/A;  ray she    HYSTERECTOMY      KNEE ARTHROSCOPY W/ DEBRIDEMENT  4/11    Left    RELEASE OF ULNAR NERVE AT CUBITAL TUNNEL Right 1/16/2019    Procedure: RELEASE, ULNAR TUNNEL right;  Surgeon: Katja Hubbard MD;  [facility];  Service: Orthopedics;  Laterality: Right;  Anesthesia: General and Regional. Stretcher, hand pan 1 and pan 2, CALL RUCHI SANTAMARIA    ROTATOR CUFF REPAIR      TOTAL ABDOMINAL HYSTERECTOMY W/ BILATERAL SALPINGOOPHORECTOMY      UPPER GASTROINTESTINAL ENDOSCOPY         Time Tracking:     PT Received On: 10/06/20  PT Start Time: 1319     PT Stop Time: 1358  PT Total Time (min): 39 min     Billable Minutes: Evaluation 8, Gait Training 16 and Therapeutic Activity 15      Wilma Jose, PT  10/06/2020   Kaz, Michel Barger, Tio Camacho

## 2022-04-06 NOTE — DISCHARGE NOTE PROVIDER - CARE PROVIDER_API CALL
Tio Porter)  Critical Care Medicine; Internal Medicine; Pulmonary Disease  [address] [place] [address]  Phone: [phone]  Fax: [phone]  Follow Up Time: 1 week    Michel Mccurdy  CARDIOVASCULAR DISEASE  [address] [address]  Phone: [phone]  Fax: [phone]  Follow Up Time: 1 week

## 2022-04-06 NOTE — H&P ADULT - NSHPPHYSICALEXAM_GEN_ALL_CORE
General: obese  PERRLA  Neurology: A&Ox2, high right now   Respiratory: CTA B/L  CV: RRR, S1S2, no murmurs, rubs or gallops  Abdominal: Soft, NT, ND +BS, Last BM  Extremities: No edema, + peripheral pulses  Skin Normal

## 2022-04-06 NOTE — DISCHARGE NOTE PROVIDER - NSDCCPCAREPLAN_GEN_ALL_CORE_FT
PRINCIPAL DISCHARGE DIAGNOSIS  Diagnosis: Chest pain of unknown etiology  Assessment and Plan of Treatment: Chest pain- trops neg x 2, no evidence of MI  TTE:  decreased RV function, EF 60%, normal LV Function  s/p diagnostic LHC via RFA on 4/8/22 with pLAD 90%; pCX 90% with patent LIMA to LAD; Patent SVG to OM and patent SVG to D1  Resolved Chest Pain        SECONDARY DISCHARGE DIAGNOSES  Diagnosis: COPD exacerbation  Assessment and Plan of Treatment: #SOB - history of copd / asthma given wheeze on exam   Placed on 3L- now weaned to off    CTA chest: No PE - Right lower lobe bronchial impaction. Improved tree-in-bud nodularity since September 2020.  Advised smoking cessation and avoid further consumption   Continue Nebulizers    Diagnosis: HTN (hypertension)  Assessment and Plan of Treatment: Take medications for your blood pressure as recommended.  Eat a heart healthy diet that is low in saturated fats and salt, and includes whole grains, fruits, vegetables and lean protein   Exercise regularly (consult with your physician or cardiologist first); maintain a heart healthy weight.    Quit smoking(A resource to help you stop smoking is the [facility] – phone number [phone].). Continue to follow with your primary physician or cardiologist.   Seek medical help for dizziness, Lightheadedness, Blurry vision, Headache, Chest pain, Shortness of breath  Follow up with your medical doctor to establish long term blood pressure treatment goals.      Diagnosis: DM (diabetes mellitus)  Assessment and Plan of Treatment: Foloow up with PMD in 1 week  HgA1C this admission - 9.1  Make sure you get your HgA1c checked every three months.  If you take insulin, check your blood glucose before meals and at bedtime.  It's important not to skip any meals.  Keep a log of your blood glucose results and always take it with you to your doctor appointments.  Keep a list of your current medications including injectables and over the counter medications and bring this medication list with you to all your doctor appointments.  If you have not seen your ophthalmologist this year call for appointment.  Check your feet daily for redness, sores, or openings. Do not self treat. If no improvement in two days call your primary care physician for an appointment.  Low blood sugar (hypoglycemia) is a blood sugar below 70mg/dl. Check your blood sugar if you feel signs/symptoms of hypoglycemia. If your blood sugar is below 70 take 15 grams of carbohydrates (ex 4 oz of apple juice, 3-4 glucose tablets, or 4-6 oz of regular soda) wait 15 minutes and repeat blood sugar to make sure it comes up above 70.  If your blood sugar is above 70 and you are due for a meal, have a meal.  If you are not due for a meal have a snack.  This snack helps keeps your blood sugar at a safe range.      Diagnosis: CAD (coronary artery disease)  Assessment and Plan of Treatment: S/P CABG  Continue Statin     PRINCIPAL DISCHARGE DIAGNOSIS  Diagnosis: Chest pain of unknown etiology  Assessment and Plan of Treatment: Chest pain- trops neg x 2, no evidence of MI  TTE:  decreased RV function, EF 60%, normal LV Function  You had a cardiac cath on 4/8/22 - no new blockages  Resolved Chest Pain  Your chest pain was due to Marijuana use  DO NOT USE any illicit drugs  SEEK IMMEDIATE MEDICAL CARE IF:  You have increased chest pain or pain that spreads to your arm, neck, jaw, back, or abdomen.   You develop shortness of breath, an increasing cough, or you are coughing up blood.  You have severe back or abdominal pain, feel nauseous, or vomit.  THIS IS AN EMERGENCY. Do not wait to see if the pain will go away. Get medical help at once. Call your local emergency services (___________[phone]__________). Do not drive yourself to the hospital.        SECONDARY DISCHARGE DIAGNOSES  Diagnosis: COPD exacerbation  Assessment and Plan of Treatment: You had shortness of breath on admission - history of copd / asthma given wheeze on exam   Placed on 3L- now weaned to off    CTA chest: No PE - Right lower lobe bronchial impaction. Improved tree-in-bud nodularity since September 2020 (showed bronchitis and bronchiolitis in September 2020)  Advised smoking cessation and avoid further consumption   Continue Nebulizers  Follow up with Pulmonologist for Full Pulmonary Function test as outpatient    Diagnosis: DM (diabetes mellitus)  Assessment and Plan of Treatment: Follow up with PMD in 1 week  HgA1C this admission - 9.1  Make sure you get your HgA1c checked every three months.  You need to be compliant with your insulins and diabetic medication and diet  If you take insulin, check your blood glucose before meals and at bedtime.  It's important not to skip any meals.  Keep a log of your blood glucose results and always take it with you to your doctor appointments.  Keep a list of your current medications including injectables and over the counter medications and bring this medication list with you to all your doctor appointments.  If you have not seen your ophthalmologist this year call for appointment.  Check your feet daily for redness, sores, or openings. Do not self treat. If no improvement in two days call your primary care physician for an appointment.  Low blood sugar (hypoglycemia) is a blood sugar below 70mg/dl. Check your blood sugar if you feel signs/symptoms of hypoglycemia. If your blood sugar is below 70 take 15 grams of carbohydrates (ex 4 oz of apple juice, 3-4 glucose tablets, or 4-6 oz of regular soda) wait 15 minutes and repeat blood sugar to make sure it comes up above 70.  If your blood sugar is above 70 and you are due for a meal, have a meal.  If you are not due for a meal have a snack.  This snack helps keeps your blood sugar at a safe range.      Diagnosis: HTN (hypertension)  Assessment and Plan of Treatment: Take medications for your blood pressure as recommended.  Eat a heart healthy diet that is low in saturated fats and salt, and includes whole grains, fruits, vegetables and lean protein   Exercise regularly (consult with your physician or cardiologist first); maintain a heart healthy weight.    Quit smoking(A resource to help you stop smoking is the [facility] – phone number [phone].). Continue to follow with your primary physician or cardiologist.   Seek medical help for dizziness, Lightheadedness, Blurry vision, Headache, Chest pain, Shortness of breath  Follow up with your medical doctor to establish long term blood pressure treatment goals.      Diagnosis: CAD (coronary artery disease)  Assessment and Plan of Treatment: S/P CABG  Continue Statin  Follow up with cardiologist in 1 week    Diagnosis: Current smoker  Assessment and Plan of Treatment: Advised smoking cessation and avoid further consumption   Quit smoking(A resource to help you stop smoking is the [facility] – phone number [phone].).

## 2022-04-06 NOTE — DISCHARGE NOTE PROVIDER - NSDCFUADDAPPT_GEN_ALL_CORE_FT
APPTS ARE READY TO BE MADE: [x] YES    Best Family or Patient Contact (if needed):    Additional Information about above appointments (if needed):    1:   2:   3:     Other comments or requests:    APPTS ARE READY TO BE MADE: [x] YES    Best Family or Patient Contact (if needed):    Additional Information about above appointments (if needed):    1:   2:   3:     Other comments or requests:   Patient will make appointments on his own.

## 2022-04-06 NOTE — ED PROVIDER NOTE - CLINICAL SUMMARY MEDICAL DECISION MAKING FREE TEXT BOX
66M p/w CP and SOB that started after he ate two edible marijuana cookies.  Pt. appears intoxicated, but states CP felt similar to prior MI.  Unsure last stress test.  EKG w/ RBBB, no acute ischemic changes.  Concerned for ACS vs infxn.  Will obtain labs, CXR, EKG, reassess, and consider admission vs observation given high risk chest pain. 66M p/w CP and SOB that started after he ate two edible marijuana cookies.  Pt. appears intoxicated, but states CP felt similar to prior MI.  Unsure last stress test.  EKG w/ RBBB, no acute ischemic changes.  Exam as above, VS notable for tachycardia.  Concerned for ACS vs infxn vs COPD exacerbation.  Will obtain labs, CXR, EKG, administer meds, reassess, and consider admission vs observation given high risk chest pain.

## 2022-04-06 NOTE — ED PROVIDER NOTE - OBJECTIVE STATEMENT
66M w/ PMHx of CAD s/p triple cardiac bypass, HTN, HLD p/w chest pain and SOB.  Pt. states he had two edible cookies for the first time and was became very drowsy.  Pt. then noticed he had a sudden onset chest pain that he describes as "someone pushing down on my chest."  No active CP during time of evaluation.  States pain feels similar to prior episode of MI.  Denies any n/v, diaphoresis.  Denies any f/c, sick contacts, abd pain, urinary sxs, rectal bleeding.  Denies concomitant etoh or other drugs. 66M w/ PMHx of CAD s/p triple cardiac bypass, HTN, HLD, COPD p/w chest pain and SOB.  Pt. states he had two edible cookies for the first time and was became very drowsy.  Pt. then noticed he had a sudden onset chest pain that he describes as "someone pushing down on my chest."  No active CP during time of evaluation.  States pain feels similar to prior episode of MI.  Denies any n/v, diaphoresis.  Denies any f/c, sick contacts, abd pain, urinary sxs, rectal bleeding.  Denies concomitant etoh or other drugs.

## 2022-04-06 NOTE — H&P ADULT - NSHPLABSRESULTS_GEN_ALL_CORE
Lab Results:  CBC  CBC Full  -  ( 06 Apr 2022 02:05 )  WBC Count : 13.25 K/uL  RBC Count : 4.80 M/uL  Hemoglobin : 13.2 g/dL  Hematocrit : 40.8 %  Platelet Count - Automated : 263 K/uL  Mean Cell Volume : 85.0 fl  Mean Cell Hemoglobin : 27.5 pg  Mean Cell Hemoglobin Concentration : 32.4 gm/dL  Auto Neutrophil # : 11.59 K/uL  Auto Lymphocyte # : 0.85 K/uL  Auto Monocyte # : 0.58 K/uL  Auto Eosinophil # : 0.10 K/uL  Auto Basophil # : 0.06 K/uL  Auto Neutrophil % : 87.4 %  Auto Lymphocyte % : 6.4 %  Auto Monocyte % : 4.4 %  Auto Eosinophil % : 0.8 %  Auto Basophil % : 0.5 %    .		Differential:	[] Automated		[] Manual  Chemistry                        13.2   13.25 )-----------( 263      ( 06 Apr 2022 02:05 )             40.8     04-06    138  |  98  |  12  ----------------------------<  224<H>  4.3   |  30  |  0.78    Ca    9.2      06 Apr 2022 02:05    TPro  7.4  /  Alb  4.3  /  TBili  0.2  /  DBili  x   /  AST  22  /  ALT  27  /  AlkPhos  81  04-06    LIVER FUNCTIONS - ( 06 Apr 2022 02:05 )  Alb: 4.3 g/dL / Pro: 7.4 g/dL / ALK PHOS: 81 U/L / ALT: 27 U/L / AST: 22 U/L / GGT: x                     MICROBIOLOGY/CULTURES:      RADIOLOGY RESULTS: reviewed

## 2022-04-06 NOTE — DISCHARGE NOTE PROVIDER - PROVIDER TOKENS
PROVIDER:[TOKEN:[[id number]],FOLLOWUP:[1 week]],PROVIDER:[TOKEN:[[id number]],FOLLOWUP:[1 week]]

## 2022-04-06 NOTE — CONSULT NOTE ADULT - SUBJECTIVE AND OBJECTIVE BOX
04-06-22 @ 14:10    Patient is a 66y old  Male who presents with a chief complaint of chest pain (06 Apr 2022 08:40)      HPI:  66M w/ PMHx of CAD s/p triple cardiac bypass, HTN, HLD, COPD p/w chest pain and SOB.  Pt. states he had two edible cookies for the first time and was became very drowsy.  Pt. then noticed he had a sudden onset chest pain that he describes as "someone pushing down on my chest."  No active CP during time of evaluation.  States pain feels similar to prior episode of MI.  Denies any n/v, diaphoresis.  Denies any f/c, sick contacts, abd pain, urinary sxs, rectal bleeding.  Denies concomitant etoh or other drugs (06 Apr 2022 08:04)   he said he came in here for chest apin:  he was adm few years ago with increasing SOB and Bronchitis and was suspected to have copd at that time:  he is still a smoker  smokes x 1 pk a day for  past 6 months but usually he smokes 4-5 cig s a day:       ?FOLLOWING PRESENT  [x ] Hx of PE/DVT, [x ] Hx COPD, [ x] Hx of Asthma, [y ] Hx of Hospitalization, [ ]x  Hx of BiPAP/CPAP use, [ ] Hx of SHAYY    Allergies    No Known Allergies    Intolerances        PAST MEDICAL & SURGICAL HISTORY:  COPD (chronic obstructive pulmonary disease)    DM2 (diabetes mellitus, type 2)    HTN (hypertension)    CAD (coronary artery disease)    Diabetic autonomic neuropathy associated with type 2 diabetes mellitus    H/O umbilical hernia repair  x2    S/P CABG x 2  Dr Ashley        FAMILY HISTORY:  Family history of diabetes mellitus (Father)        Social History: [ smoker: active > 40 years ] TOBACCO                  [x  ] ETOH                                 [ weed  ] IVDA/DRUGS    REVIEW OF SYSTEMS      General:x	    Skin/Breast:x  	  Ophthalmologic:x  	x  ENMT:	    Respiratory and Thorax: chest pain, vasquez   	  Cardiovascular:	x    Gastrointestinal:	x    Genitourinary:	x    Musculoskeletal:	x    Neurological:	x    Psychiatric:	x    Hematology/Lymphatics:	x    Endocrine:	x    Allergic/Immunologic:	x    MEDICATIONS  (STANDING):  aspirin enteric coated 81 milliGRAM(s) Oral daily  atorvastatin 20 milliGRAM(s) Oral at bedtime  budesonide 160 MICROgram(s)/formoterol 4.5 MICROgram(s) Inhaler 2 Puff(s) Inhalation two times a day  dextrose 5%. 1000 milliLiter(s) (50 mL/Hr) IV Continuous <Continuous>  dextrose 5%. 1000 milliLiter(s) (100 mL/Hr) IV Continuous <Continuous>  dextrose 50% Injectable 25 Gram(s) IV Push once  dextrose 50% Injectable 12.5 Gram(s) IV Push once  dextrose 50% Injectable 25 Gram(s) IV Push once  enoxaparin Injectable 40 milliGRAM(s) SubCutaneous every 24 hours  furosemide    Tablet 20 milliGRAM(s) Oral daily  glucagon  Injectable 1 milliGRAM(s) IntraMuscular once  insulin glargine Injectable (LANTUS) 30 Unit(s) SubCutaneous at bedtime  insulin lispro (ADMELOG) corrective regimen sliding scale   SubCutaneous three times a day before meals  insulin lispro Injectable (ADMELOG) 10 Unit(s) SubCutaneous three times a day before meals  lisinopril 10 milliGRAM(s) Oral daily    MEDICATIONS  (PRN):  ALBUTerol    90 MICROgram(s) HFA Inhaler 2 Puff(s) Inhalation every 6 hours PRN Shortness of Breath and/or Wheezing  dextrose Oral Gel 15 Gram(s) Oral once PRN Blood Glucose LESS THAN 70 milliGRAM(s)/deciliter       Vital Signs Last 24 Hrs  T(C): 37 (06 Apr 2022 12:30), Max: 37.2 (06 Apr 2022 00:45)  T(F): 98.6 (06 Apr 2022 12:30), Max: 99 (06 Apr 2022 00:45)  HR: 95 (06 Apr 2022 12:30) (94 - 132)  BP: 106/69 (06 Apr 2022 12:30) (102/68 - 148/90)  BP(mean): --  RR: 18 (06 Apr 2022 12:30) (18 - 20)  SpO2: 93% (06 Apr 2022 12:30) (93% - 96%)Orthostatic VS          I&O's Summary      Physical Exam:   GENERAL: Obese+  HEENT: JOSÉ LUIS/   Atraumatic, Normocephalic  ENMT: No tonsillar erythema, exudates, or enlargement; Moist mucous membranes, Good dentition, No lesions  NECK: Supple, No JVD, Normal thyroid  CHEST/LUNG: Clear to auscultation bilaterally  CVS: Regular rate and rhythm; No murmurs, rubs, or gallops  GI: : Soft, Nontender, Nondistended; Bowel sounds present  NERVOUS SYSTEM:  Alert & Oriented X3  EXTREMITIES: - edema  LYMPH: No lymphadenopathy noted  SKIN: No rashes or lesions  ENDOCRINOLOGY: No Thyromegaly  PSYCH: Appropriate    Labs:  Venous<63<4>>39<<7.345>>Venous<<3><<4><<5<<399>>                            13.2   13.25 )-----------( 263      ( 06 Apr 2022 02:05 )             40.8     04-06    138  |  98  |  12  ----------------------------<  224<H>  4.3   |  30  |  0.78    Ca    9.2      06 Apr 2022 02:05    TPro  7.4  /  Alb  4.3  /  TBili  0.2  /  DBili  x   /  AST  22  /  ALT  27  /  AlkPhos  81  04-06    CAPILLARY BLOOD GLUCOSE      POCT Blood Glucose.: 239 mg/dL (06 Apr 2022 11:32)    LIVER FUNCTIONS - ( 06 Apr 2022 02:05 )  Alb: 4.3 g/dL / Pro: 7.4 g/dL / ALK PHOS: 81 U/L / ALT: 27 U/L / AST: 22 U/L / GGT: x               D DImer  Serum Pro-Brain Natriuretic Peptide: 114 pg/mL (04-06 @ 02:05)      Studies  Chest X-RAY  CT SCAN Chest   CT Abdomen  Venous Dopplers: LE:   Others        rad< from: Xray Chest 1 View- PORTABLE-Urgent (04.06.22 @ 01:55) >  COMPARISON: CT chest from 9/9/2020, chest x-ray from 2/1/2021.    FINDINGS:    Clear lungs.  No pneumothorax or large pleural effusion.  Heart size cannot be accurately assessed in this projection.   Redemonstrated prominent epicardial fat. Sternotomy wires.  No acute osseous abnormality.    IMPRESSION:    Clear lungs.    --- End of Report ---          TUTU CONCEPCION MD; Resident Radiologist  This document has been electronically signed.  MATT RAMIREZ MD; Attending Radiologist  This document has been electronically signed. Apr 62022 10:04AM    < end of copied text >

## 2022-04-06 NOTE — ED ADULT NURSE NOTE - NSIMPLEMENTINTERV_GEN_ALL_ED
Implemented All Universal Safety Interventions:  [address] to call system. Call bell, personal items and telephone within reach. Instruct patient to call for assistance. Room bathroom lighting operational. Non-slip footwear when patient is off stretcher. Physically safe environment: no spills, clutter or unnecessary equipment. Stretcher in lowest position, wheels locked, appropriate side rails in place.

## 2022-04-06 NOTE — CONSULT NOTE ADULT - ASSESSMENT
Chest pain   sob   improving   ? due to his copd / asthma given wheeze on exam   no evidence of acute MI   fu with pulm   obtain echo   eventual cath this admission once more stable from pulm standpoint     CAD s/p cabg   cont asa, statin   plan as above    COPD   nebs  fu with pulm      HTN  cont current meds     DMII  check hga1c  Monitor finger stick. Insulin coverage. Diabetic education and Diabetic diet. Consider nutrition consultation.      Advanced care planning was discussed with patient and family.  Risks, benefits and alternatives of the cardiac treatments and medical therapy including procedures were discussed in detail and all questions were answered. Importance of compliance with medical therapy and lifestyle modification to improve cardiovascular health were addressed. Appropriate forms and patient educational materials were reviewed. 30 minutes face to face spent.

## 2022-04-06 NOTE — DISCHARGE NOTE PROVIDER - HOSPITAL COURSE
HPI:  66M w/ PMHx of CAD s/p triple cardiac bypass, HTN, HLD, COPD p/w chest pain and SOB.  Pt. states he had two edible cookies for the first time and was became very drowsy.  Pt. then noticed he had a sudden onset chest pain that he describes as "someone pushing down on my chest."  No active CP during time of evaluation.  States pain feels similar to prior episode of MI.  Denies any n/v, diaphoresis.  Denies any f/c, sick contacts, abd pain, urinary sxs, rectal bleeding.  Denies concomitant etoh or other drugs    Hospital Course:  #chest pain- improving, trops neg x 2, no evidence of MI  TTE:  eventual cath:    #SOB  - place don 3l- weaned   - acute on chronic hypercapnic resp failure  - history of bronchitis  - CTA chest:  - advise smoking cesssation and avoid further consumption     #CAD s/p cabg   cont asa, statin        66M w/ PMHx of CAD s/p triple cardiac bypass, HTN, HLD, COPD p/w chest pain and SOB.  Pt. states he had two edible cookies for the first time and was became very drowsy.  Pt. then noticed he had a sudden onset chest pain that he describes as "someone pushing down on my chest."  No active CP during time of evaluation.  States pain feels similar to prior episode of MI.  Denies any n/v, diaphoresis.  Denies any f/c, sick contacts, abd pain, urinary sxs, rectal bleeding.  Denies concomitant etoh or other drugs    Hospital Course:  Chest pain- trops neg x 2, no evidence of MI  TTE:  decreased RV function, EF 60%, normal LV Function  s/p diagnostic LHC via RFA on 4/8/22 with pLAD 90%; pCX 90% with patent LIMA to LAD; Patent SVG to OM and patent SVG to D1  Resolved Chest Pain    #SOB - history of copd / asthma given wheeze on exam   Placed on 3L- now weaned to off    CTA chest: No PE - Right lower lobe bronchial impaction. Improved tree-in-bud nodularity since September 2020.  Advised smoking cessation and avoid further consumption   Continue Nebulizers                       66M w/ PMHx of CAD s/p triple cardiac bypass, HTN, HLD, COPD p/w chest pain and SOB.  Pt. states he had two edible cookies for the first time and was became very drowsy.  Pt. then noticed he had a sudden onset chest pain that he describes as "someone pushing down on my chest."  No active CP during time of evaluation.  States pain feels similar to prior episode of MI.  Denies any n/v, diaphoresis.  Denies any f/c, sick contacts, abd pain, urinary sxs, rectal bleeding.  Denies concomitant etoh or other drugs    Hospital Course:  Chest pain- trops neg x 2, no evidence of MI - Chest pain secondary to Marijuana use  TTE:  decreased RV function, EF 60%, normal LV Function  s/p diagnostic LHC via RFA on 4/8/22 with pLAD 90%; pCX 90% with patent LIMA to LAD; Patent SVG to OM and patent SVG to D1  Resolved Chest Pain    #SOB - history of copd / asthma given wheeze on exam   Placed on 3L- now weaned to off    CTA chest: No PE - Right lower lobe bronchial impaction. Improved tree-in-bud nodularity since September 2020.  Advised smoking cessation and avoid further consumption   Continue Nebulizers    Discharge home today if cath diagnostic per Dr. Mccurdy  Medically cleared by Dr. Barger to discharge patient home with follow up with Pulmonologist for full PFTs

## 2022-04-06 NOTE — H&P ADULT - ASSESSMENT
66M w/ PMHx of CAD s/p triple cardiac bypass, HTN, HLD, COPD p/w chest pain and SOB.  Pt. states he had two edible cookies for the first time and was became very drowsy.  Pt. then noticed he had a sudden onset chest pain that he describes as "someone pushing down on my chest."  No active CP during time of evaluation.  States pain feels similar to prior episode of MI.  Denies any n/v, diaphoresis.  Denies any f/c, sick contacts, abd pain, urinary sxs, rectal bleeding.  Denies concomitant etoh or other drugs    1 Chest pain  - telemonitor   - ro ACS  - Cards fu  - cw asa, statin  - ischemia lópez as per cards    2 Substance abuse  - currently high on marijuana   - willl monitor     3 DM  - monitor FS  - basal, bolus    Lovenox for DVT prophylaxis

## 2022-04-06 NOTE — ED PROVIDER NOTE - ATTENDING CONTRIBUTION TO CARE
Pt w CP/SOB s/p having marijuana edibles for the first time. pt appears intoxicated but in NAD, stable vitals, no signs of resp distress or fluid overload. initial w/u shows non-ischemic ekg, first trop neg. pt is high risk cardiac pt and will get admitted for acs w/u, cards eval.

## 2022-04-06 NOTE — DISCHARGE NOTE PROVIDER - NSFOLLOWUPCLINICS_GEN_ALL_ED_FT
[facility] [place]  Internal Medicine  [address] [place] [address]  Phone: [phone]  Fax: [phone]  Follow Up Time: 1 week

## 2022-04-06 NOTE — DISCHARGE NOTE PROVIDER - NSDCCPTREATMENT_GEN_ALL_CORE_FT
PRINCIPAL PROCEDURE  Procedure: Left heart catheterization  Findings and Treatment: The procedure was done via the groin   No heavy lifting, strenuous activity, bending, straining or unnecessary stair climbing for 2 weeks. No sex for 1 week. No driving for 2 days. You may shower 24 hours following procedure but avoid baths and swimming for 1 week. Check groin site for bleeding and/or swelling daily following procedure. Call your doctor/cardiologist immediately for bleeding or swelling or if you have increased/persistent pain, fever/chills, or drainage at the site. Follow up with your cardiologist in 1- 2 weeks. You may call [facility] at [phone] or [phone] after office hours and weekends with any questions or concerns following our procedure.

## 2022-04-06 NOTE — ED ADULT NURSE NOTE - OBJECTIVE STATEMENT
67yo M with PMH COPD, DM, HTN, CAD, presents to ED via EMS c/o chest tightness and SOB. Patient states, "I was hanging out with my friends tonight when one of them gave me 2 edible cookies of marijuana and after I ate them I was feeling chest tightness and SOB. This is the first time I have ever taken anything like this". Patient denies any pain at this time. Denies N/V/D, dizziness, lightheadedness, unsteady gait, fevers/chills. A&Ox4. Neurologically intact and follows commands, answer questions appropriately. Strong peripheral pulses. Abdomen soft, nondistended (according to patient), nontender. Skin warm, dry, intact, and normal for ethnicity. Stretcher in lowest position, side rails up. Patient instructed to notify RN if assistance is needed. 67yo M with PMH COPD, DM, HTN, CAD, presents to ED via EMS c/o chest tightness and SOB. Patient states, "I was hanging out with my friends tonight when one of them gave me 2 edible cookies of marijuana and after I ate them I was feeling chest tightness and SOB. This is the first time I have ever taken anything like this". Patient denies any pain at this time. Denies N/V/D, dizziness, lightheadedness, any alcohol use, unsteady gait, fevers/chills. A&Ox4. Neurologically intact and follows commands, answer questions appropriately. Strong peripheral pulses. Abdomen soft, nondistended (according to patient), nontender. Skin warm, dry, intact, and normal for ethnicity. Stretcher in lowest position, side rails up. Patient instructed to notify RN if assistance is needed.

## 2022-04-06 NOTE — CONSULT NOTE ADULT - ASSESSMENT
66M w/ PMHx of CAD s/p triple cardiac bypass, HTN, HLD, COPD p/w chest pain and SOB.  Pt. states he had two edible cookies for the first time and was became very drowsy.  Pt. then noticed he had a sudden onset chest pain that he describes as "someone pushing down on my chest."  No active CP during time of evaluation.  States pain feels similar to prior episode of MI.  Denies any n/v, diaphoresis.  Denies any f/c, sick contacts, abd pain, urinary sxs, rectal bleeding.  Denies concomitant etoh or other drugs    1 Chest pain  2 Substance abuse  3 DM  4: copd:     Lovenox for DVT prophylaxis     he seems to be doing ok : he came in with after weed ingestion:  then he developed chest pain:  he has ac on chr mild hypercapnic resp failure  he is an active smoker:   ct chest  contrast: as he did complainof sob on admission: currently heis doing ok:  last time it showed bronchitis and bronchiolitis:   cont BD;  adv to avoid weed!  DVT propahxykis

## 2022-04-06 NOTE — DISCHARGE NOTE PROVIDER - NSDCMRMEDTOKEN_GEN_ALL_CORE_FT
albuterol 90 mcg/inh inhalation aerosol: 2 puff(s) inhaled every 6 hours, As needed, Shortness of Breath and/or Wheezing  aspirin 81 mg oral delayed release tablet: 1 tab(s) orally once a day  atorvastatin 40 mg oral tablet: 1 tab(s) orally once a day (at bedtime)  azithromycin 500 mg oral tablet: 1 tab(s) orally once a day  budesonide-formoterol 160 mcg-4.5 mcg/inh inhalation aerosol: 2 puff(s) inhaled 2 times a day   insulin glargine: 55 unit(s) subcutaneous once a day (at bedtime)  insulin lispro 100 units/mL subcutaneous solution: 20 unit(s) subcutaneous 3 times a day (before meals)  isosorbide mononitrate 30 mg oral tablet, extended release: 1 tab(s) orally once a day   Lasix 20 mg oral tablet: 1 tab(s) orally once a day  lisinopril 10 mg oral tablet: 1 tab(s) orally once a day  nadolol 20 mg oral tablet: 1 tab(s) orally once a day  predniSONE 10 mg oral tablet: 3 tab(s) orally once a day    albuterol 90 mcg/inh inhalation aerosol: 2 puff(s) inhaled every 6 hours, As needed, Shortness of Breath and/or Wheezing  aspirin 81 mg oral delayed release tablet: 1 tab(s) orally once a day  atorvastatin 40 mg oral tablet: 1 tab(s) orally once a day (at bedtime)  budesonide-formoterol 160 mcg-4.5 mcg/inh inhalation aerosol: 2 puff(s) inhaled 2 times a day   buprenorphine-naloxone 8 mg-2 mg sublingual tablet: 1 tab(s) sublingual 3 times a day  fenofibrate 134 mg oral capsule: 1 cap(s) orally once a day  insulin glargine: 60 unit(s) subcutaneous once a day (at bedtime)  insulin lispro 100 units/mL subcutaneous solution: 20 unit(s) subcutaneous 3 times a day (before meals)  Lasix 20 mg oral tablet: 1 tab(s) orally once a day **patient is nonadherent to this regimen**  lisinopril 10 mg oral tablet: 1 tab(s) orally once a day  metFORMIN 1000 mg oral tablet: 1 tab(s) orally 2 times a day  RESUME on 4/11/22   albuterol 90 mcg/inh inhalation aerosol: 2 puff(s) inhaled every 6 hours, As needed, Shortness of Breath and/or Wheezing  aspirin 81 mg oral delayed release tablet: 1 tab(s) orally once a day  atorvastatin 40 mg oral tablet: 1 tab(s) orally once a day (at bedtime)  budesonide-formoterol 160 mcg-4.5 mcg/inh inhalation aerosol: 2 puff(s) inhaled 2 times a day   buprenorphine-naloxone 8 mg-2 mg sublingual tablet: 1 tab(s) sublingual 3 times a day  fenofibrate 134 mg oral capsule: 1 cap(s) orally once a day  insulin glargine: 40 unit(s) subcutaneous once a day (at bedtime)  NEED TO BE COMPLIANT  insulin lispro 100 units/mL subcutaneous solution: 15 unit(s) subcutaneous 3 times a day (before meals)  NEED TO BE COMPLIANT  Lasix 20 mg oral tablet: 1 tab(s) orally once a day **patient is nonadherent to this regimen**  lisinopril 10 mg oral tablet: 1 tab(s) orally once a day  metFORMIN 1000 mg oral tablet: 1 tab(s) orally 2 times a day  RESUME on 4/11/22  tiotropium 18 mcg inhalation capsule: 1 cap(s) inhaled once a day

## 2022-04-06 NOTE — H&P ADULT - HISTORY OF PRESENT ILLNESS
66M w/ PMHx of CAD s/p triple cardiac bypass, HTN, HLD, COPD p/w chest pain and SOB.  Pt. states he had two edible cookies for the first time and was became very drowsy.  Pt. then noticed he had a sudden onset chest pain that he describes as "someone pushing down on my chest."  No active CP during time of evaluation.  States pain feels similar to prior episode of MI.  Denies any n/v, diaphoresis.  Denies any f/c, sick contacts, abd pain, urinary sxs, rectal bleeding.  Denies concomitant etoh or other drugs

## 2022-04-07 DIAGNOSIS — R07.9 CHEST PAIN, UNSPECIFIED: ICD-10-CM

## 2022-04-07 DIAGNOSIS — E66.9 OBESITY, UNSPECIFIED: ICD-10-CM

## 2022-04-07 DIAGNOSIS — I10 ESSENTIAL (PRIMARY) HYPERTENSION: ICD-10-CM

## 2022-04-07 DIAGNOSIS — E11.9 TYPE 2 DIABETES MELLITUS WITHOUT COMPLICATIONS: ICD-10-CM

## 2022-04-07 LAB
A1C WITH ESTIMATED AVERAGE GLUCOSE RESULT: 9.1 % — HIGH (ref 4–5.6)
ALBUMIN SERPL ELPH-MCNC: 3.9 G/DL — SIGNIFICANT CHANGE UP (ref 3.3–5)
ALP SERPL-CCNC: 67 U/L — SIGNIFICANT CHANGE UP (ref 40–120)
ALT FLD-CCNC: 23 U/L — SIGNIFICANT CHANGE UP (ref 10–45)
ANION GAP SERPL CALC-SCNC: 10 MMOL/L — SIGNIFICANT CHANGE UP (ref 5–17)
AST SERPL-CCNC: 15 U/L — SIGNIFICANT CHANGE UP (ref 10–40)
BASOPHILS # BLD AUTO: 0.04 K/UL — SIGNIFICANT CHANGE UP (ref 0–0.2)
BASOPHILS NFR BLD AUTO: 0.5 % — SIGNIFICANT CHANGE UP (ref 0–2)
BILIRUB SERPL-MCNC: 0.3 MG/DL — SIGNIFICANT CHANGE UP (ref 0.2–1.2)
BUN SERPL-MCNC: 18 MG/DL — SIGNIFICANT CHANGE UP (ref 7–23)
CALCIUM SERPL-MCNC: 9 MG/DL — SIGNIFICANT CHANGE UP (ref 8.4–10.5)
CHLORIDE SERPL-SCNC: 100 MMOL/L — SIGNIFICANT CHANGE UP (ref 96–108)
CHOLEST SERPL-MCNC: 170 MG/DL — SIGNIFICANT CHANGE UP
CO2 SERPL-SCNC: 28 MMOL/L — SIGNIFICANT CHANGE UP (ref 22–31)
CREAT SERPL-MCNC: 0.76 MG/DL — SIGNIFICANT CHANGE UP (ref 0.5–1.3)
EGFR: 100 ML/MIN/1.73M2 — SIGNIFICANT CHANGE UP
EOSINOPHIL # BLD AUTO: 0.16 K/UL — SIGNIFICANT CHANGE UP (ref 0–0.5)
EOSINOPHIL NFR BLD AUTO: 2.2 % — SIGNIFICANT CHANGE UP (ref 0–6)
ESTIMATED AVERAGE GLUCOSE: 214 MG/DL — HIGH (ref 68–114)
GLUCOSE BLDC GLUCOMTR-MCNC: 210 MG/DL — HIGH (ref 70–99)
GLUCOSE BLDC GLUCOMTR-MCNC: 241 MG/DL — HIGH (ref 70–99)
GLUCOSE BLDC GLUCOMTR-MCNC: 265 MG/DL — HIGH (ref 70–99)
GLUCOSE BLDC GLUCOMTR-MCNC: 275 MG/DL — HIGH (ref 70–99)
GLUCOSE SERPL-MCNC: 233 MG/DL — HIGH (ref 70–99)
HCT VFR BLD CALC: 39.7 % — SIGNIFICANT CHANGE UP (ref 39–50)
HDLC SERPL-MCNC: 39 MG/DL — LOW
HGB BLD-MCNC: 12.6 G/DL — LOW (ref 13–17)
IMM GRANULOCYTES NFR BLD AUTO: 0.4 % — SIGNIFICANT CHANGE UP (ref 0–1.5)
LIPID PNL WITH DIRECT LDL SERPL: 103 MG/DL — HIGH
LYMPHOCYTES # BLD AUTO: 1.45 K/UL — SIGNIFICANT CHANGE UP (ref 1–3.3)
LYMPHOCYTES # BLD AUTO: 19.8 % — SIGNIFICANT CHANGE UP (ref 13–44)
MAGNESIUM SERPL-MCNC: 1.9 MG/DL — SIGNIFICANT CHANGE UP (ref 1.6–2.6)
MCHC RBC-ENTMCNC: 27.1 PG — SIGNIFICANT CHANGE UP (ref 27–34)
MCHC RBC-ENTMCNC: 31.7 GM/DL — LOW (ref 32–36)
MCV RBC AUTO: 85.4 FL — SIGNIFICANT CHANGE UP (ref 80–100)
MONOCYTES # BLD AUTO: 0.5 K/UL — SIGNIFICANT CHANGE UP (ref 0–0.9)
MONOCYTES NFR BLD AUTO: 6.8 % — SIGNIFICANT CHANGE UP (ref 2–14)
NEUTROPHILS # BLD AUTO: 5.16 K/UL — SIGNIFICANT CHANGE UP (ref 1.8–7.4)
NEUTROPHILS NFR BLD AUTO: 70.3 % — SIGNIFICANT CHANGE UP (ref 43–77)
NON HDL CHOLESTEROL: 130 MG/DL — HIGH
NRBC # BLD: 0 /100 WBCS — SIGNIFICANT CHANGE UP (ref 0–0)
PLATELET # BLD AUTO: 234 K/UL — SIGNIFICANT CHANGE UP (ref 150–400)
POTASSIUM SERPL-MCNC: 4.1 MMOL/L — SIGNIFICANT CHANGE UP (ref 3.5–5.3)
POTASSIUM SERPL-SCNC: 4.1 MMOL/L — SIGNIFICANT CHANGE UP (ref 3.5–5.3)
PROT SERPL-MCNC: 6.5 G/DL — SIGNIFICANT CHANGE UP (ref 6–8.3)
RBC # BLD: 4.65 M/UL — SIGNIFICANT CHANGE UP (ref 4.2–5.8)
RBC # FLD: 14.2 % — SIGNIFICANT CHANGE UP (ref 10.3–14.5)
SODIUM SERPL-SCNC: 138 MMOL/L — SIGNIFICANT CHANGE UP (ref 135–145)
TRIGL SERPL-MCNC: 135 MG/DL — SIGNIFICANT CHANGE UP
WBC # BLD: 7.34 K/UL — SIGNIFICANT CHANGE UP (ref 3.8–10.5)
WBC # FLD AUTO: 7.34 K/UL — SIGNIFICANT CHANGE UP (ref 3.8–10.5)

## 2022-04-07 RX ORDER — FENOFIBRATE,MICRONIZED 130 MG
1 CAPSULE ORAL
Qty: 0 | Refills: 0 | DISCHARGE

## 2022-04-07 RX ORDER — INSULIN GLARGINE 100 [IU]/ML
36 INJECTION, SOLUTION SUBCUTANEOUS AT BEDTIME
Refills: 0 | Status: DISCONTINUED | OUTPATIENT
Start: 2022-04-07 | End: 2022-04-08

## 2022-04-07 RX ORDER — TIOTROPIUM BROMIDE 18 UG/1
1 CAPSULE ORAL; RESPIRATORY (INHALATION) DAILY
Refills: 0 | Status: DISCONTINUED | OUTPATIENT
Start: 2022-04-07 | End: 2022-04-08

## 2022-04-07 RX ORDER — ATORVASTATIN CALCIUM 80 MG/1
40 TABLET, FILM COATED ORAL AT BEDTIME
Refills: 0 | Status: DISCONTINUED | OUTPATIENT
Start: 2022-04-07 | End: 2022-04-08

## 2022-04-07 RX ORDER — INSULIN LISPRO 100/ML
11 VIAL (ML) SUBCUTANEOUS
Refills: 0 | Status: DISCONTINUED | OUTPATIENT
Start: 2022-04-07 | End: 2022-04-07

## 2022-04-07 RX ORDER — BUPRENORPHINE AND NALOXONE 2; .5 MG/1; MG/1
1 TABLET SUBLINGUAL
Refills: 0 | Status: DISCONTINUED | OUTPATIENT
Start: 2022-04-07 | End: 2022-04-08

## 2022-04-07 RX ORDER — FUROSEMIDE 40 MG
1 TABLET ORAL
Qty: 0 | Refills: 0 | DISCHARGE

## 2022-04-07 RX ORDER — INSULIN GLARGINE 100 [IU]/ML
34 INJECTION, SOLUTION SUBCUTANEOUS AT BEDTIME
Refills: 0 | Status: DISCONTINUED | OUTPATIENT
Start: 2022-04-07 | End: 2022-04-07

## 2022-04-07 RX ORDER — MAGNESIUM OXIDE 400 MG ORAL TABLET 241.3 MG
400 TABLET ORAL ONCE
Refills: 0 | Status: COMPLETED | OUTPATIENT
Start: 2022-04-07 | End: 2022-04-07

## 2022-04-07 RX ORDER — INSULIN LISPRO 100/ML
13 VIAL (ML) SUBCUTANEOUS
Refills: 0 | Status: DISCONTINUED | OUTPATIENT
Start: 2022-04-07 | End: 2022-04-08

## 2022-04-07 RX ORDER — BUPRENORPHINE AND NALOXONE 2; .5 MG/1; MG/1
1 TABLET SUBLINGUAL
Qty: 0 | Refills: 0 | DISCHARGE

## 2022-04-07 RX ORDER — NADOLOL 80 MG/1
1 TABLET ORAL
Qty: 0 | Refills: 0 | DISCHARGE

## 2022-04-07 RX ORDER — LISINOPRIL 2.5 MG/1
1 TABLET ORAL
Qty: 0 | Refills: 0 | DISCHARGE

## 2022-04-07 RX ORDER — FENOFIBRATE,MICRONIZED 130 MG
48 CAPSULE ORAL DAILY
Refills: 0 | Status: DISCONTINUED | OUTPATIENT
Start: 2022-04-07 | End: 2022-04-08

## 2022-04-07 RX ADMIN — Medication 2: at 08:20

## 2022-04-07 RX ADMIN — BUDESONIDE AND FORMOTEROL FUMARATE DIHYDRATE 2 PUFF(S): 160; 4.5 AEROSOL RESPIRATORY (INHALATION) at 05:07

## 2022-04-07 RX ADMIN — Medication 13 UNIT(S): at 11:53

## 2022-04-07 RX ADMIN — Medication 2: at 11:52

## 2022-04-07 RX ADMIN — BUDESONIDE AND FORMOTEROL FUMARATE DIHYDRATE 2 PUFF(S): 160; 4.5 AEROSOL RESPIRATORY (INHALATION) at 17:50

## 2022-04-07 RX ADMIN — Medication 81 MILLIGRAM(S): at 11:27

## 2022-04-07 RX ADMIN — Medication 13 UNIT(S): at 17:50

## 2022-04-07 RX ADMIN — ENOXAPARIN SODIUM 40 MILLIGRAM(S): 100 INJECTION SUBCUTANEOUS at 11:28

## 2022-04-07 RX ADMIN — Medication 20 MILLIGRAM(S): at 05:08

## 2022-04-07 RX ADMIN — Medication 48 MILLIGRAM(S): at 23:04

## 2022-04-07 RX ADMIN — BUPRENORPHINE AND NALOXONE 1 TABLET(S): 2; .5 TABLET SUBLINGUAL at 16:25

## 2022-04-07 RX ADMIN — Medication 3: at 17:49

## 2022-04-07 RX ADMIN — INSULIN GLARGINE 36 UNIT(S): 100 INJECTION, SOLUTION SUBCUTANEOUS at 21:10

## 2022-04-07 RX ADMIN — ATORVASTATIN CALCIUM 40 MILLIGRAM(S): 80 TABLET, FILM COATED ORAL at 21:10

## 2022-04-07 RX ADMIN — TIOTROPIUM BROMIDE 1 CAPSULE(S): 18 CAPSULE ORAL; RESPIRATORY (INHALATION) at 21:09

## 2022-04-07 RX ADMIN — LISINOPRIL 10 MILLIGRAM(S): 2.5 TABLET ORAL at 05:08

## 2022-04-07 RX ADMIN — MAGNESIUM OXIDE 400 MG ORAL TABLET 400 MILLIGRAM(S): 241.3 TABLET ORAL at 16:25

## 2022-04-07 RX ADMIN — Medication 10 UNIT(S): at 08:20

## 2022-04-07 RX ADMIN — BUPRENORPHINE AND NALOXONE 1 TABLET(S): 2; .5 TABLET SUBLINGUAL at 23:04

## 2022-04-07 NOTE — PROGRESS NOTE ADULT - ASSESSMENT
· Assessment	  66M w/ PMHx of CAD s/p triple cardiac bypass, HTN, HLD, COPD p/w chest pain and SOB.  Pt. states he had two edible cookies for the first time and was became very drowsy.  Pt. then noticed he had a sudden onset chest pain that he describes as "someone pushing down on my chest."  No active CP during time of evaluation.  States pain feels similar to prior episode of MI.  Denies any n/v, diaphoresis.  Denies any f/c, sick contacts, abd pain, urinary sxs, rectal bleeding.  Denies concomitant etoh or other drugs    1 Chest pain  - telemonitor   - ro ACS  - Cards fu  - cw asa, statin  - ischemia lópez as per cards  - cath on am     2 Substance abuse  - cw Suboxone  - willl monitor     3 DM  - monitor FS  - basal, bolus    Lovenox for DVT prophylaxis

## 2022-04-07 NOTE — PROGRESS NOTE ADULT - SUBJECTIVE AND OBJECTIVE BOX
DATE OF SERVICE: 04-07-22 @ 07:21    Subjective: Patient seen and examined. No new events except as noted.     SUBJECTIVE/ROS:  feels much better  No chest pain, dyspnea, palpitation, or dizziness.       MEDICATIONS:  MEDICATIONS  (STANDING):  aspirin enteric coated 81 milliGRAM(s) Oral daily  atorvastatin 20 milliGRAM(s) Oral at bedtime  budesonide 160 MICROgram(s)/formoterol 4.5 MICROgram(s) Inhaler 2 Puff(s) Inhalation two times a day  dextrose 5%. 1000 milliLiter(s) (100 mL/Hr) IV Continuous <Continuous>  dextrose 5%. 1000 milliLiter(s) (50 mL/Hr) IV Continuous <Continuous>  dextrose 50% Injectable 25 Gram(s) IV Push once  dextrose 50% Injectable 12.5 Gram(s) IV Push once  dextrose 50% Injectable 25 Gram(s) IV Push once  enoxaparin Injectable 40 milliGRAM(s) SubCutaneous every 24 hours  furosemide    Tablet 20 milliGRAM(s) Oral daily  glucagon  Injectable 1 milliGRAM(s) IntraMuscular once  insulin glargine Injectable (LANTUS) 30 Unit(s) SubCutaneous at bedtime  insulin lispro (ADMELOG) corrective regimen sliding scale   SubCutaneous three times a day before meals  insulin lispro Injectable (ADMELOG) 10 Unit(s) SubCutaneous three times a day before meals  lisinopril 10 milliGRAM(s) Oral daily      PHYSICAL EXAM:  T(C): 36.5 (04-07-22 @ 04:45), Max: 37 (04-06-22 @ 12:30)  HR: 90 (04-07-22 @ 04:45) (85 - 101)  BP: 117/71 (04-07-22 @ 04:45) (106/69 - 117/71)  RR: 18 (04-07-22 @ 04:45) (18 - 20)  SpO2: 94% (04-07-22 @ 04:45) (92% - 96%)  Wt(kg): --  I&O's Summary    06 Apr 2022 07:01  -  07 Apr 2022 07:00  --------------------------------------------------------  IN: 660 mL / OUT: 0 mL / NET: 660 mL        Weight (kg): 126.552 (04-06 @ 16:41)      JVP: Normal  Neck: supple  Lung: clear   CV: S1 S2 , Murmur:  Abd: soft  Ext: No edema  neuro: Awake / alert  Psych: flat affect  Skin: normal``    LABS/DATA:    CARDIAC MARKERS:                                12.6   7.34  )-----------( 234      ( 07 Apr 2022 06:36 )             39.7     04-06    138  |  98  |  12  ----------------------------<  224<H>  4.3   |  30  |  0.78    Ca    9.2      06 Apr 2022 02:05    TPro  7.4  /  Alb  4.3  /  TBili  0.2  /  DBili  x   /  AST  22  /  ALT  27  /  AlkPhos  81  04-06    proBNP:   Lipid Profile:   HgA1c:   TSH:     TELE:  EKG:

## 2022-04-07 NOTE — CONSULT NOTE ADULT - SUBJECTIVE AND OBJECTIVE BOX
HPI:  66M w/ PMHx of CAD s/p triple cardiac bypass, HTN, HLD, COPD p/w chest pain and SOB.  Pt. states he had two edible cookies for the first time and was became very drowsy.  Pt. then noticed he had a sudden onset chest pain that he describes as "someone pushing down on my chest."  No active CP during time of evaluation.  States pain feels similar to prior episode of MI.  Denies any n/v, diaphoresis.  Denies any f/c, sick contacts, abd pain, urinary sxs, rectal bleeding.  Denies concomitant etoh or other drugs (06 Apr 2022 08:04)  Patient has history of diabetes, A1C 9.1%,  on insulin at home (Lantus 60u at bedtime, Humalog 20u before meals), no recent hypoglycemic episodes, no polyuria polydipsia. Patient follows up with PCP for diabetes management.  Endo was consulted for glycemic control.    PAST MEDICAL & SURGICAL HISTORY:  COPD (chronic obstructive pulmonary disease)    DM2 (diabetes mellitus, type 2)    HTN (hypertension)    CAD (coronary artery disease)    Diabetic autonomic neuropathy associated with type 2 diabetes mellitus    H/O umbilical hernia repair  x2    S/P CABG x 2  Dr Ashley        FAMILY HISTORY:  Family history of diabetes mellitus (Father)        Social History:    Outpatient Medications:    MEDICATIONS  (STANDING):  aspirin enteric coated 81 milliGRAM(s) Oral daily  atorvastatin 20 milliGRAM(s) Oral at bedtime  budesonide 160 MICROgram(s)/formoterol 4.5 MICROgram(s) Inhaler 2 Puff(s) Inhalation two times a day  dextrose 5%. 1000 milliLiter(s) (50 mL/Hr) IV Continuous <Continuous>  dextrose 5%. 1000 milliLiter(s) (100 mL/Hr) IV Continuous <Continuous>  dextrose 50% Injectable 25 Gram(s) IV Push once  dextrose 50% Injectable 12.5 Gram(s) IV Push once  dextrose 50% Injectable 25 Gram(s) IV Push once  enoxaparin Injectable 40 milliGRAM(s) SubCutaneous every 24 hours  furosemide    Tablet 20 milliGRAM(s) Oral daily  glucagon  Injectable 1 milliGRAM(s) IntraMuscular once  insulin glargine Injectable (LANTUS) 36 Unit(s) SubCutaneous at bedtime  insulin lispro (ADMELOG) corrective regimen sliding scale   SubCutaneous three times a day before meals  insulin lispro Injectable (ADMELOG) 13 Unit(s) SubCutaneous three times a day before meals  lisinopril 10 milliGRAM(s) Oral daily    MEDICATIONS  (PRN):  ALBUTerol    90 MICROgram(s) HFA Inhaler 2 Puff(s) Inhalation every 6 hours PRN Shortness of Breath and/or Wheezing  dextrose Oral Gel 15 Gram(s) Oral once PRN Blood Glucose LESS THAN 70 milliGRAM(s)/deciliter      Allergies    No Known Allergies    Intolerances      Review of Systems:  Constitutional: No fever, no chills  Eyes: No blurry vision  Neuro: No tremors  HEENT: No pain, no neck swelling  Cardiovascular: No chest pain, no palpitations  Respiratory: Has SOB, no cough  GI: No nausea, vomiting, abdominal pain  : No dysuria  Skin: no rash  MSK: Has leg swelling.  Psych: no depression  Endocrine: no polyuria, polydipsia    ALL OTHER SYSTEMS REVIEWED AND NEGATIVE    UNABLE TO OBTAIN    PHYSICAL EXAM:  VITALS: T(C): 36.5 (04-07-22 @ 04:45)  T(F): 97.7 (04-07-22 @ 04:45), Max: 98.6 (04-06-22 @ 12:30)  HR: 90 (04-07-22 @ 04:45) (85 - 95)  BP: 117/71 (04-07-22 @ 04:45) (106/69 - 117/71)  RR:  (18 - 19)  SpO2:  (92% - 94%)  Wt(kg): --  GENERAL: NAD, well-groomed, well-developed  EYES: No proptosis, no lid lag  HEENT:  Atraumatic, Normocephalic  THYROID: Normal size, no palpable nodules  RESPIRATORY: Clear to auscultation bilaterally; No rales, rhonchi, wheezing  CARDIOVASCULAR: Si S2, No murmurs;  GI: Soft, non distended, normal bowel sounds  SKIN: Dry, intact, No rashes or lesions  MUSCULOSKELETAL: Has BL lower extremity edema.  NEURO:  no tremor, sensation decreased in feet BL,    POCT Blood Glucose.: 210 mg/dL (04-07-22 @ 08:11)  POCT Blood Glucose.: 274 mg/dL (04-06-22 @ 21:06)  POCT Blood Glucose.: 203 mg/dL (04-06-22 @ 16:39)  POCT Blood Glucose.: 239 mg/dL (04-06-22 @ 11:32)                            12.6   7.34  )-----------( 234      ( 07 Apr 2022 06:36 )             39.7       04-07    138  |  100  |  18  ----------------------------<  233<H>  4.1   |  28  |  0.76    EGFR if : x   EGFR if non : x     Ca    9.0      04-07  Mg     1.9     04-07    TPro  6.5  /  Alb  3.9  /  TBili  0.3  /  DBili  x   /  AST  15  /  ALT  23  /  AlkPhos  67  04-07      Thyroid Function Tests:              Radiology:                HPI:  66M w/ PMHx of CAD s/p triple cardiac bypass, HTN, HLD, COPD p/w chest pain and SOB.  Pt. states he had two edible cookies for the first time and was became very drowsy.  Pt. then noticed he had a sudden onset chest pain that he describes as "someone pushing down on my chest."  No active CP during time of evaluation.  States pain feels similar to prior episode of MI.  Denies any n/v, diaphoresis.  Denies any f/c, sick contacts, abd pain, urinary sxs, rectal bleeding.  Denies concomitant etoh or other drugs (06 Apr 2022 08:04)  Patient has history of diabetes, A1C 9.1%,  on insulin at home (Lantus 60u at bedtime, Humalog 20u before meals), no recent hypoglycemic episodes, no polyuria polydipsia. Patient follows up with PCP for diabetes management.  Endo was consulted for glycemic control.    PAST MEDICAL & SURGICAL HISTORY:  COPD (chronic obstructive pulmonary disease)    DM2 (diabetes mellitus, type 2)    HTN (hypertension)    CAD (coronary artery disease)    Diabetic autonomic neuropathy associated with type 2 diabetes mellitus    H/O umbilical hernia repair  x2    S/P CABG x 2  Dr Ashley        FAMILY HISTORY:  Family history of diabetes mellitus (Father)        Social History:    Outpatient Medications:    MEDICATIONS  (STANDING):  aspirin enteric coated 81 milliGRAM(s) Oral daily  atorvastatin 20 milliGRAM(s) Oral at bedtime  budesonide 160 MICROgram(s)/formoterol 4.5 MICROgram(s) Inhaler 2 Puff(s) Inhalation two times a day  dextrose 5%. 1000 milliLiter(s) (50 mL/Hr) IV Continuous <Continuous>  dextrose 5%. 1000 milliLiter(s) (100 mL/Hr) IV Continuous <Continuous>  dextrose 50% Injectable 25 Gram(s) IV Push once  dextrose 50% Injectable 12.5 Gram(s) IV Push once  dextrose 50% Injectable 25 Gram(s) IV Push once  enoxaparin Injectable 40 milliGRAM(s) SubCutaneous every 24 hours  furosemide    Tablet 20 milliGRAM(s) Oral daily  glucagon  Injectable 1 milliGRAM(s) IntraMuscular once  insulin glargine Injectable (LANTUS) 36 Unit(s) SubCutaneous at bedtime  insulin lispro (ADMELOG) corrective regimen sliding scale   SubCutaneous three times a day before meals  insulin lispro Injectable (ADMELOG) 13 Unit(s) SubCutaneous three times a day before meals  lisinopril 10 milliGRAM(s) Oral daily    MEDICATIONS  (PRN):  ALBUTerol    90 MICROgram(s) HFA Inhaler 2 Puff(s) Inhalation every 6 hours PRN Shortness of Breath and/or Wheezing  dextrose Oral Gel 15 Gram(s) Oral once PRN Blood Glucose LESS THAN 70 milliGRAM(s)/deciliter      Allergies    No Known Allergies    Intolerances      Review of Systems:  Constitutional: No fever, no chills  Eyes: No blurry vision  Neuro: No tremors  HEENT: No pain, no neck swelling  Cardiovascular: No chest pain, no palpitations  Respiratory: Has SOB, no cough  GI: No nausea, vomiting, abdominal pain  : No dysuria  Skin: no rash  MSK: Has leg swelling.  Psych: no depression  Endocrine: no polyuria, polydipsia    ALL OTHER SYSTEMS REVIEWED AND NEGATIVE    UNABLE TO OBTAIN    PHYSICAL EXAM:  VITALS: T(C): 36.5 (04-07-22 @ 04:45)  T(F): 97.7 (04-07-22 @ 04:45), Max: 98.6 (04-06-22 @ 12:30)  HR: 90 (04-07-22 @ 04:45) (85 - 95)  BP: 117/71 (04-07-22 @ 04:45) (106/69 - 117/71)  RR:  (18 - 19)  SpO2:  (92% - 94%)  Wt(kg): --  GENERAL: NAD, well-groomed, well-developed  EYES: No proptosis, no lid lag  HEENT:  Atraumatic, Normocephalic  THYROID: Normal size, no palpable nodules  RESPIRATORY: Clear to auscultation bilaterally; No rales, rhonchi, wheezing  CARDIOVASCULAR: Si S2, No murmurs;  GI: Soft, non distended, normal bowel sounds  SKIN: Dry, intact, No rashes or lesions  MUSCULOSKELETAL: Has BL lower extremity edema.  NEURO:  no tremor, sensation decreased in feet BL,    POCT Blood Glucose.: 210 mg/dL (04-07-22 @ 08:11)  POCT Blood Glucose.: 274 mg/dL (04-06-22 @ 21:06)  POCT Blood Glucose.: 203 mg/dL (04-06-22 @ 16:39)  POCT Blood Glucose.: 239 mg/dL (04-06-22 @ 11:32)                            12.6   7.34  )-----------( 234      ( 07 Apr 2022 06:36 )             39.7       04-07    138  |  100  |  18  ----------------------------<  233<H>  4.1   |  28  |  0.76    EGFR if : x   EGFR if non : x     Ca    9.0      04-07  Mg     1.9     04-07    TPro  6.5  /  Alb  3.9  /  TBili  0.3  /  DBili  x   /  AST  15  /  ALT  23  /  AlkPhos  67  04-07      Thyroid Function Tests:              Radiology:

## 2022-04-07 NOTE — PATIENT PROFILE ADULT - FALL HARM RISK - UNIVERSAL INTERVENTIONS
Bed in lowest position, wheels locked, appropriate side rails in place/Call bell, personal items and telephone in reach/Instruct patient to call for assistance before getting out of bed or chair/Non-slip footwear when patient is out of bed/[place] to call system/Physically safe environment - no spills, clutter or unnecessary equipment/Purposeful Proactive Rounding/Room/bathroom lighting operational, light cord in reach

## 2022-04-07 NOTE — PROGRESS NOTE ADULT - SUBJECTIVE AND OBJECTIVE BOX
Date of Service: 04-07-22 @ 11:05    Patient is a 66y old  Male who presents with a chief complaint of chest pain (07 Apr 2022 07:21)      Any change in ROS:   Doing ok:  sitting on chair:  says he is still smoking:  feels OK: no events overnight:  no chest pain today :     MEDICATIONS  (STANDING):  aspirin enteric coated 81 milliGRAM(s) Oral daily  atorvastatin 20 milliGRAM(s) Oral at bedtime  budesonide 160 MICROgram(s)/formoterol 4.5 MICROgram(s) Inhaler 2 Puff(s) Inhalation two times a day  dextrose 5%. 1000 milliLiter(s) (50 mL/Hr) IV Continuous <Continuous>  dextrose 5%. 1000 milliLiter(s) (100 mL/Hr) IV Continuous <Continuous>  dextrose 50% Injectable 25 Gram(s) IV Push once  dextrose 50% Injectable 12.5 Gram(s) IV Push once  dextrose 50% Injectable 25 Gram(s) IV Push once  enoxaparin Injectable 40 milliGRAM(s) SubCutaneous every 24 hours  furosemide    Tablet 20 milliGRAM(s) Oral daily  glucagon  Injectable 1 milliGRAM(s) IntraMuscular once  insulin glargine Injectable (LANTUS) 36 Unit(s) SubCutaneous at bedtime  insulin lispro (ADMELOG) corrective regimen sliding scale   SubCutaneous three times a day before meals  insulin lispro Injectable (ADMELOG) 13 Unit(s) SubCutaneous three times a day before meals  lisinopril 10 milliGRAM(s) Oral daily    MEDICATIONS  (PRN):  ALBUTerol    90 MICROgram(s) HFA Inhaler 2 Puff(s) Inhalation every 6 hours PRN Shortness of Breath and/or Wheezing  dextrose Oral Gel 15 Gram(s) Oral once PRN Blood Glucose LESS THAN 70 milliGRAM(s)/deciliter    Vital Signs Last 24 Hrs  T(C): 36.5 (07 Apr 2022 04:45), Max: 37 (06 Apr 2022 12:30)  T(F): 97.7 (07 Apr 2022 04:45), Max: 98.6 (06 Apr 2022 12:30)  HR: 90 (07 Apr 2022 04:45) (85 - 95)  BP: 117/71 (07 Apr 2022 04:45) (106/69 - 117/71)  BP(mean): --  RR: 18 (07 Apr 2022 04:45) (18 - 19)  SpO2: 94% (07 Apr 2022 04:45) (92% - 94%)    I&O's Summary    06 Apr 2022 07:01  -  07 Apr 2022 07:00  --------------------------------------------------------  IN: 660 mL / OUT: 0 mL / NET: 660 mL    07 Apr 2022 07:01  -  07 Apr 2022 11:05  --------------------------------------------------------  IN: 360 mL / OUT: 0 mL / NET: 360 mL          Physical Exam:   GENERAL: Obese+  HEENT: JOSÉ LUIS/   Atraumatic, Normocephalic  ENMT: No tonsillar erythema, exudates, or enlargement; Moist mucous membranes, Good dentition, No lesions  NECK: Supple, No JVD, Normal thyroid  CHEST/LUNG: Clear to auscultaion  CVS: Regular rate and rhythm; No murmurs, rubs, or gallops  GI: : Soft, Nontender, Nondistended; Bowel sounds present  NERVOUS SYSTEM:  Alert & Oriented X3  EXTREMITIES:  -edema  LYMPH: No lymphadenopathy noted  SKIN: No rashes or lesions  ENDOCRINOLOGY: No Thyromegaly  PSYCH: calm+     Labs:  34                            12.6   7.34  )-----------( 234      ( 07 Apr 2022 06:36 )             39.7                         13.2   13.25 )-----------( 263      ( 06 Apr 2022 02:05 )             40.8     04-07    138  |  100  |  18  ----------------------------<  233<H>  4.1   |  28  |  0.76  04-06    138  |  98  |  12  ----------------------------<  224<H>  4.3   |  30  |  0.78    Ca    9.0      07 Apr 2022 06:36  Ca    9.2      06 Apr 2022 02:05  Mg     1.9     04-07    TPro  6.5  /  Alb  3.9  /  TBili  0.3  /  DBili  x   /  AST  15  /  ALT  23  /  AlkPhos  67  04-07  TPro  7.4  /  Alb  4.3  /  TBili  0.2  /  DBili  x   /  AST  22  /  ALT  27  /  AlkPhos  81  04-06    CAPILLARY BLOOD GLUCOSE      POCT Blood Glucose.: 210 mg/dL (07 Apr 2022 08:11)  POCT Blood Glucose.: 274 mg/dL (06 Apr 2022 21:06)  POCT Blood Glucose.: 203 mg/dL (06 Apr 2022 16:39)  POCT Blood Glucose.: 239 mg/dL (06 Apr 2022 11:32)      LIVER FUNCTIONS - ( 07 Apr 2022 06:36 )  Alb: 3.9 g/dL / Pro: 6.5 g/dL / ALK PHOS: 67 U/L / ALT: 23 U/L / AST: 15 U/L / GGT: x               Serum Pro-Brain Natriuretic Peptide: 114 pg/mL (04-06 @ 02:05)        RECENT CULTURES:    rad< from: CT Angio Chest PE Protocol w/ IV Cont (04.06.22 @ 21:54) >  effusion. Status post median sternotomy and CABG. Mid ascending aorta   measures 4.1 cm.  Aortic origin of the left vertebral artery, normal   variant.    Lungs/Airways/Pleura: The central airways are patent. No pleural   effusion. There is diffuse bronchial wall thickening with segmental and   subsegmental bronchial impaction in the right lower lobe. Previously seen   associated tree-in-bud nodularity in this region has improved since   September 2020.    Mediastinum/Lymph nodes: No thoracic adenopathy. Unremarkable thyroid.    Upper Abdomen: Hepatic steatosis.    Bones and Soft Tissues: No aggressive osseous lesions. Small   intramuscular lipoma anterior to the right scapula.    IMPRESSION:  No pulmonary embolism, although limited evaluation of several   segmental/subsegmental branches.    Right lower lobe bronchial impaction. Improved tree-in-bud nodularity   since September 2020.    --- End of Report ---            FABIAN MANZANO M.D., Attending Radiologist  This document has been electronically signed. Apr 7 2022  8:33AM    < end of copied text >      RESPIRATORY CULTURES:          Studies  Chest X-RAY  CT SCAN Chest   Venous Dopplers: LE:   CT Abdomen  Others    < from: TTE with Doppler (w/Cont) (04.06.22 @ 13:27) >  Mitral Valve: Mitral valve not well visualized, probably  normal.  Aortic Valve/Aorta: Aortic valve not well visualized.  Mild aortic root dilatation  (Ao: 4.3 cm at the sinuses of  Valsalva).  Left Atrium: Normal left atrium.  LA volume index = 34  cc/m2.  Left Ventricle: Endocardial visualization enhanced with  intravenous injection of Ultrasonic Enhancing Agent  (Definity). Endocardium not well visualized; grossly normal  left ventricular systolic function. Normal left ventricular  internal dimensions and wall thicknesses. Normal diastolic  function  Right Heart: Normal right atrium. Right ventricular  enlargement with decreased right ventricular systolic  function. Normal tricuspidvalve. Normal pulmonic valve.  Pericardium/Pleura: Normal pericardium with trace  pericardial effusion.  Hemodynamic: Unable to estimate RVSP.  ------------------------------------------------------------------------  Conclusions:  1. Mild aortic root dilatation  (Ao: 4.3 cm at the sinuses  of Valsalva).  2. Endocardial visualization enhanced with intravenous  injection of Ultrasonic Enhancing Agent (Definity).  Endocardium not well visualized; grossly normal left  ventricular systolic function.  3. Right ventricular enlargement with decreased right  ventricular systolic function.  ------------------------------------------------------------------------  Confirmed on  4/6/2022 - 19:25:23 by LEO Ibanez  ------------------------------------------------------------------------    < end of copied text >

## 2022-04-07 NOTE — PHARMACOTHERAPY INTERVENTION NOTE - COMMENTS
Medication reconciliation completed. Please refer to specifics in home medication list (outpatient medication review). Medications verified with patient, pharmacy records, and [[facility]- spoke to technician]    -------------------------------------------------------------------------------------------  Home Medications:    albuterol 90 mcg/inh inhalation aerosol: 2 puff(s) inhaled every 6 hours, As needed, Shortness of Breath and/or Wheezing   aspirin 81 mg oral delayed release tablet: 1 tab(s) orally once a day   atorvastatin 40 mg oral tablet: 1 tab(s) orally once a day (at bedtime)   insulin lispro 100 units/mL subcutaneous solution: 20 unit(s) subcutaneous 3 times a day (before meals)   Lasix 20 mg oral tablet: 1 tab(s) orally once a day   lisinopril 10 mg oral tablet: 1 tab(s) orally once a day         Added:  Buprenorphine-Naloxone 8-2 SL Tabs: Dissolve 1 tab under the tongue TID   Trelegy Ellipta 100mcg/62/5mcg/25mcg: Inhale 1 puff by mouth daily   Fenofibrate 134 mg capsules: take 1 capsule po daily    Changed:  Insulin glargine: 60 unit(s) subcutaneous once a day (at bedtime) [previously 55 units at bedtime]     Removed:  nadolol 20 mg oral tablet: 1 tab(s) orally once a day     -------------------------------------------------------------------------------------------  Note:   Patient states he just returned from [place] after 6 months and was brought to the hospital by an ambulance. Patient has adherence issues to his medication regimen. Patient picked up his furosemide 20 mg tablets but claims that he does not like going to the bathroom frequently so he has not taken the medication in 6-7 months. Patient also states that he forgets/does not want to take his second dose of metformin daily. Patient no longer receives aspirin as a prescription and buys it OTC.     Recommendations:  Patient was recommended to follow up with his outpatient provider and notify the provider of medication adherence issues.     Time spent: 10 minutes     Pallak Sharma, PharmD Candidate Medication reconciliation completed. Please refer to specifics in home medication list (outpatient medication review). Medications verified with patient, pharmacy records, and [[facility] [address] [facility], [phone], spoke to technician]    -------------------------------------------------------------------------------------------  Home Medications:    albuterol 90 mcg/inh inhalation aerosol: 2 puff(s) inhaled every 6 hours, As needed, Shortness of Breath and/or Wheezing   aspirin 81 mg oral delayed release tablet: 1 tab(s) orally once a day   atorvastatin 40 mg oral tablet: 1 tab(s) orally once a day (at bedtime)   insulin lispro 100 units/mL subcutaneous solution: 20 unit(s) subcutaneous 3 times a day (before meals)   Lasix 20 mg oral tablet: 1 tab(s) orally once a day   lisinopril 10 mg oral tablet: 1 tab(s) orally once a day         Added:  Buprenorphine-Naloxone 8-2 SL Tabs: Dissolve 1 tab under the tongue TID   Trelegy Ellipta 100mcg/62/5mcg/25mcg: Inhale 1 puff by mouth daily   Fenofibrate 134 mg capsules: take 1 capsule po daily    Changed:  Insulin glargine: 60 unit(s) subcutaneous once a day (at bedtime) [previously 55 units at bedtime]     Removed:  nadolol 20 mg oral tablet: 1 tab(s) orally once a day     -------------------------------------------------------------------------------------------  Note:   Patient states he just returned from [place] after 6 months and was brought to the hospital by an ambulance. Patient has adherence issues to his medication regimen. Patient picked up his furosemide 20 mg tablets but claims that he does not like going to the bathroom frequently so he has not taken the medication in 6-7 months. Patient also states that he forgets/does not want to take his second dose of metformin daily. Patient no longer receives aspirin as a prescription and buys it OTC. Patient was recommended to follow up with his outpatient provider and notify the provider of medication adherence issues.     Recommendations:  Recommend adding Buprenorphine-Naloxone 8-2 SL Tabs: Dissolve 1 tab under the tongue TID to the in-patient treatment plan.  Recommend changing atorvastatin 20 mg to atorvastatin 40 mg (1 tablet daily) based on home medication regimen.     Time spent: 10 minutes     Pallak Sharma, PharmD Candidate Medication reconciliation completed. Please refer to specifics in home medication list (outpatient medication review). Medications verified with patient, pharmacy records, and [[facility] [address] [facility], [phone], spoke to technician]    -------------------------------------------------------------------------------------------  Home Medications:    albuterol 90 mcg/inh inhalation aerosol: 2 puff(s) inhaled every 6 hours, As needed, Shortness of Breath and/or Wheezing   aspirin 81 mg oral delayed release tablet: 1 tab(s) orally once a day   atorvastatin 40 mg oral tablet: 1 tab(s) orally once a day (at bedtime)   insulin lispro 100 units/mL subcutaneous solution: 20 unit(s) subcutaneous 3 times a day (before meals)   Lasix 20 mg oral tablet: 1 tab(s) orally once a day   lisinopril 10 mg oral tablet: 1 tab(s) orally once a day         Added:  Buprenorphine-Naloxone 8-2 SL Tabs: Dissolve 1 tab under the tongue TID   Trelegy Ellipta 100mcg/62/5mcg/25mcg: Inhale 1 puff by mouth daily   Fenofibrate 134 mg capsules: take 1 capsule by mouth daily  Metformin 1000 mg: take 1 tablet by mouth BID     Changed:  Insulin glargine: 60 unit(s) subcutaneous once a day (at bedtime) [previously 55 units at bedtime]     Removed:  nadolol 20 mg oral tablet: 1 tab(s) orally once a day     -------------------------------------------------------------------------------------------  Note:   Patient states he just returned from [place] after 6 months and was brought to the hospital by an ambulance. Patient has adherence issues to his medication regimen. Patient picked up his furosemide 20 mg tablets but claims that he does not like going to the bathroom frequently so he has not taken the medication in 6-7 months. Patient also states that he forgets/does not want to take his second dose of metformin daily. Patient no longer receives aspirin as a prescription and buys it OTC. Patient was recommended to follow up with his outpatient provider and notify the provider of medication adherence issues.     Recommendations:  Recommend adding Buprenorphine-Naloxone 8-2 SL Tabs: Dissolve 1 tab under the tongue TID to the in-patient treatment plan.  Recommend changing atorvastatin 20 mg to atorvastatin 40 mg (1 tablet daily) based on home medication regimen.     Time spent: 10 minutes     Pallak Sharma, PharmD Candidate Medication reconciliation completed. Please refer to specifics in home medication list (outpatient medication review). Medications verified with patient, pharmacy records, and [[facility] [address] [facility], [phone], spoke to technician].    -------------------------------------------------------------------------------------------  Home Medications:    albuterol 90 mcg/inh inhalation aerosol: 2 puff(s) inhaled every 6 hours, As needed, Shortness of Breath and/or Wheezing   aspirin 81 mg oral delayed release tablet: 1 tab(s) orally once a day   atorvastatin 40 mg oral tablet: 1 tab(s) orally once a day (at bedtime)   insulin lispro 100 units/mL subcutaneous solution: 20 unit(s) subcutaneous 3 times a day (before meals)   Lasix 20 mg oral tablet: 1 tab(s) orally once a day   lisinopril 10 mg oral tablet: 1 tab(s) orally once a day     Added:  Buprenorphine-Naloxone 8-2 SL Tabs: Dissolve 1 tab under the tongue TID   Trelegy Ellipta 100mcg/62/5mcg/25mcg: Inhale 1 puff by mouth daily   Fenofibrate 134 mg capsules: take 1 capsule by mouth daily  Metformin 1000 mg: take 1 tablet by mouth BID     Changed:  Insulin glargine: 60 unit(s) subcutaneous once a day (at bedtime) [previously 55 units at bedtime]     Removed:  nadolol 20 mg oral tablet: 1 tab(s) orally once a day     -------------------------------------------------------------------------------------------  Note:   Patient states he just returned from [place] after 6 months and was brought to the hospital by an ambulance. Patient has adherence issues to his medication regimen. Patient picked up his furosemide 20 mg tablets but claims that he does not like going to the bathroom frequently so he has not taken the medication in 6-7 months. Patient also states that he forgets/does not want to take his second dose of metformin daily. Patient no longer receives aspirin as a prescription and buys it OTC.     Discrepancies communicated with PA. Pallak Sharma, PharmD Candidate    Raffy AvelarD, [facility]  Clinical Pharmacy Specialist  [phone] or Teams

## 2022-04-07 NOTE — CONSULT NOTE ADULT - PROBLEM SELECTOR RECOMMENDATION 3
Detail Level: Detailed
Quality 110: Preventive Care And Screening: Influenza Immunization: Influenza Immunization previously received during influenza season
Suggest to continue medications, monitoring, FU primary team recommendations.

## 2022-04-07 NOTE — PROGRESS NOTE ADULT - SUBJECTIVE AND OBJECTIVE BOX
Patient is a 66y old  Male who presents with a chief complaint of chest pain (07 Apr 2022 11:45)    Date of servie : 04-07-22 @ 14:50  INTERVAL HPI/OVERNIGHT EVENTS:  T(C): 37.2 (04-07-22 @ 14:10), Max: 37.2 (04-07-22 @ 14:10)  HR: 90 (04-07-22 @ 14:10) (85 - 90)  BP: 131/73 (04-07-22 @ 14:10) (114/67 - 131/73)  RR: 16 (04-07-22 @ 14:10) (16 - 19)  SpO2: 94% (04-07-22 @ 14:10) (92% - 94%)  Wt(kg): --  I&O's Summary    06 Apr 2022 07:01  -  07 Apr 2022 07:00  --------------------------------------------------------  IN: 660 mL / OUT: 0 mL / NET: 660 mL    07 Apr 2022 07:01  -  07 Apr 2022 14:50  --------------------------------------------------------  IN: 780 mL / OUT: 0 mL / NET: 780 mL        LABS:                        12.6   7.34  )-----------( 234      ( 07 Apr 2022 06:36 )             39.7     04-07    138  |  100  |  18  ----------------------------<  233<H>  4.1   |  28  |  0.76    Ca    9.0      07 Apr 2022 06:36  Mg     1.9     04-07    TPro  6.5  /  Alb  3.9  /  TBili  0.3  /  DBili  x   /  AST  15  /  ALT  23  /  AlkPhos  67  04-07        CAPILLARY BLOOD GLUCOSE      POCT Blood Glucose.: 241 mg/dL (07 Apr 2022 11:46)  POCT Blood Glucose.: 210 mg/dL (07 Apr 2022 08:11)  POCT Blood Glucose.: 274 mg/dL (06 Apr 2022 21:06)  POCT Blood Glucose.: 203 mg/dL (06 Apr 2022 16:39)            MEDICATIONS  (STANDING):  aspirin enteric coated 81 milliGRAM(s) Oral daily  atorvastatin 40 milliGRAM(s) Oral at bedtime  budesonide 160 MICROgram(s)/formoterol 4.5 MICROgram(s) Inhaler 2 Puff(s) Inhalation two times a day  buprenorphine 8 mG/naloxone 2 mG SL  Tablet 1 Tablet(s) SubLingual <User Schedule>  dextrose 5%. 1000 milliLiter(s) (100 mL/Hr) IV Continuous <Continuous>  dextrose 5%. 1000 milliLiter(s) (50 mL/Hr) IV Continuous <Continuous>  dextrose 50% Injectable 25 Gram(s) IV Push once  dextrose 50% Injectable 12.5 Gram(s) IV Push once  dextrose 50% Injectable 25 Gram(s) IV Push once  enoxaparin Injectable 40 milliGRAM(s) SubCutaneous every 24 hours  furosemide    Tablet 20 milliGRAM(s) Oral daily  glucagon  Injectable 1 milliGRAM(s) IntraMuscular once  insulin glargine Injectable (LANTUS) 36 Unit(s) SubCutaneous at bedtime  insulin lispro (ADMELOG) corrective regimen sliding scale   SubCutaneous three times a day before meals  insulin lispro Injectable (ADMELOG) 13 Unit(s) SubCutaneous three times a day before meals  lisinopril 10 milliGRAM(s) Oral daily  magnesium oxide 400 milliGRAM(s) Oral once  tiotropium 18 MICROgram(s) Capsule 1 Capsule(s) Inhalation daily    MEDICATIONS  (PRN):  ALBUTerol    90 MICROgram(s) HFA Inhaler 2 Puff(s) Inhalation every 6 hours PRN Shortness of Breath and/or Wheezing  dextrose Oral Gel 15 Gram(s) Oral once PRN Blood Glucose LESS THAN 70 milliGRAM(s)/deciliter          PHYSICAL EXAM:  GENERAL: NAD, well-groomed, well-developed  HEAD:  Atraumatic, Normocephalic  CHEST/LUNG: Clear to percussion bilaterally; No rales, rhonchi, wheezing, or rubs  HEART: Regular rate and rhythm; No murmurs, rubs, or gallops  ABDOMEN: Soft, Nontender, Nondistended; Bowel sounds present  EXTREMITIES:  2+ Peripheral Pulses, No clubbing, cyanosis, or edema  LYMPH: No lymphadenopathy noted  SKIN: No rashes or lesions    Care Discussed with Consultants/Other Providers [ ] YES  [ ] NO

## 2022-04-07 NOTE — PROGRESS NOTE ADULT - ASSESSMENT
66M w/ PMHx of CAD s/p triple cardiac bypass, HTN, HLD, COPD p/w chest pain and SOB.  Pt. states he had two edible cookies for the first time and was became very drowsy.  Pt. then noticed he had a sudden onset chest pain that he describes as "someone pushing down on my chest."  No active CP during time of evaluation.  States pain feels similar to prior episode of MI.  Denies any n/v, diaphoresis.  Denies any f/c, sick contacts, abd pain, urinary sxs, rectal bleeding.  Denies concomitant etoh or other drugs    1 Chest pain  2 Substance abuse  3 DM  4: copd:     Lovenox for DVT prophylaxis     he seems to be doing ok : he came in with after weed ingestion:  then he developed chest pain:  he has ac on chr mild hypercapnic resp failure  he is an active smoker:   ct chest  contrast: as he did complainof sob on admission: currently heis doing ok:  last time it showed bronchitis and bronchiolitis:   cont BD;  adv to avoid weed!  DVT merrill    4/7:    he seems to be doing ok : he came in with after weed ingestion: no evetns overnight: ct scan chest noted: looks better then last time: no PE , though somewhat limited! : grossly normal LV fucntion  he has ac on chr mild hypercapnic resp failure  he is an active smoker:   ct chest  contrast: as he did complain of sob on admission: currently he is doing ok:  last time it showed bronchitis and bronchiolitis: nre ct chest noted: seems to be better: adv strongly to stop smoking@  cont BD;  adv to avoid weed!  DVT prophylaxis

## 2022-04-07 NOTE — CONSULT NOTE ADULT - ASSESSMENT
Assessment  DMT2: 66y Male with DM T2 with hyperglycemia, A1C 9.1%, was on insulin at home, now on basal bolus insulin, blood sugars running high and not at target, no hypoglycemic episode,  eating meals, NAD.  Chest Pain: workup in progress, stable, monitored.  HTN: Controlled,  on antihypertensive medications.  Obesity: No strict exercise routines, not on any weight loss program, neither on low calorie diet.        Diego Stovall MD  Cell: [phone]  Office: [phone]     Assessment  DMT2: 66y Male with DM T2 with hyperglycemia, A1C 9.1%, was on insulin at home, now on  basal bolus insulin, blood sugars running high and not at target, no hypoglycemic episode,  eating meals, NAD.  Chest Pain: workup in progress, stable, monitored.  HTN: Controlled,  on antihypertensive medications.  Obesity: No strict exercise routines, not on any weight loss program, neither on low calorie diet.        Diego Stovall MD  Cell: [phone]  Office: [phone]

## 2022-04-07 NOTE — CONSULT NOTE ADULT - PROBLEM SELECTOR RECOMMENDATION 9
Will increase Lantus to 36u at bedtime.  Will increase Admelog to 13u before each meal and continue Admelog correction scale coverage. Will continue monitoring FS and FU.  If blood sugars are stable can DC on current basal bolus insulin regimen, Endo FU 4 weeks.  Patient counseled for compliance with consistent low carb diet and exercise as tolerated outpatient.

## 2022-04-07 NOTE — PROGRESS NOTE ADULT - ASSESSMENT
Chest pain   sob   improving   ? due to his copd / asthma given wheeze on exam   no evidence of acute MI   fu with pulm   obtain echo   cath hopefully tomorrow as pt got contrast yesterday     CAD s/p cabg   cont asa, statin   plan as above    COPD   nebs  fu with pulm      HTN  cont current meds     DMII  check hga1c  Monitor finger stick. Insulin coverage. Diabetic education and Diabetic diet. Consider nutrition consultation.      Advanced care planning was discussed with patient and family.  Risks, benefits and alternatives of the cardiac treatments and medical therapy including procedures were discussed in detail and all questions were answered. Importance of compliance with medical therapy and lifestyle modification to improve cardiovascular health were addressed. Appropriate forms and patient educational materials were reviewed. 30 minutes face to face spent.

## 2022-04-08 ENCOUNTER — TRANSCRIPTION ENCOUNTER (OUTPATIENT)
Age: 67
End: 2022-04-08

## 2022-04-08 VITALS
RESPIRATION RATE: 18 BRPM | TEMPERATURE: 98 F | HEART RATE: 85 BPM | DIASTOLIC BLOOD PRESSURE: 80 MMHG | SYSTOLIC BLOOD PRESSURE: 147 MMHG | OXYGEN SATURATION: 94 %

## 2022-04-08 LAB
ALBUMIN SERPL ELPH-MCNC: 3.9 G/DL — SIGNIFICANT CHANGE UP (ref 3.3–5)
ALP SERPL-CCNC: 72 U/L — SIGNIFICANT CHANGE UP (ref 40–120)
ALT FLD-CCNC: 20 U/L — SIGNIFICANT CHANGE UP (ref 10–45)
ANION GAP SERPL CALC-SCNC: 13 MMOL/L — SIGNIFICANT CHANGE UP (ref 5–17)
AST SERPL-CCNC: 14 U/L — SIGNIFICANT CHANGE UP (ref 10–40)
BASE EXCESS BLDA CALC-SCNC: 6.5 MMOL/L — HIGH (ref -2–3)
BASOPHILS # BLD AUTO: 0.03 K/UL — SIGNIFICANT CHANGE UP (ref 0–0.2)
BASOPHILS NFR BLD AUTO: 0.4 % — SIGNIFICANT CHANGE UP (ref 0–2)
BILIRUB SERPL-MCNC: 0.2 MG/DL — SIGNIFICANT CHANGE UP (ref 0.2–1.2)
BUN SERPL-MCNC: 20 MG/DL — SIGNIFICANT CHANGE UP (ref 7–23)
CALCIUM SERPL-MCNC: 9.3 MG/DL — SIGNIFICANT CHANGE UP (ref 8.4–10.5)
CHLORIDE SERPL-SCNC: 100 MMOL/L — SIGNIFICANT CHANGE UP (ref 96–108)
CO2 BLDA-SCNC: 34 MMOL/L — HIGH (ref 19–24)
CO2 SERPL-SCNC: 26 MMOL/L — SIGNIFICANT CHANGE UP (ref 22–31)
CREAT SERPL-MCNC: 0.8 MG/DL — SIGNIFICANT CHANGE UP (ref 0.5–1.3)
EGFR: 99 ML/MIN/1.73M2 — SIGNIFICANT CHANGE UP
EOSINOPHIL # BLD AUTO: 0.17 K/UL — SIGNIFICANT CHANGE UP (ref 0–0.5)
EOSINOPHIL NFR BLD AUTO: 2.1 % — SIGNIFICANT CHANGE UP (ref 0–6)
GAS PNL BLDA: SIGNIFICANT CHANGE UP
GLUCOSE BLDC GLUCOMTR-MCNC: 219 MG/DL — HIGH (ref 70–99)
GLUCOSE BLDC GLUCOMTR-MCNC: 228 MG/DL — HIGH (ref 70–99)
GLUCOSE BLDC GLUCOMTR-MCNC: 315 MG/DL — HIGH (ref 70–99)
GLUCOSE SERPL-MCNC: 248 MG/DL — HIGH (ref 70–99)
HCO3 BLDA-SCNC: 33 MMOL/L — HIGH (ref 21–28)
HCT VFR BLD CALC: 38 % — LOW (ref 39–50)
HGB BLD-MCNC: 12.3 G/DL — LOW (ref 13–17)
IMM GRANULOCYTES NFR BLD AUTO: 0.4 % — SIGNIFICANT CHANGE UP (ref 0–1.5)
LYMPHOCYTES # BLD AUTO: 1.61 K/UL — SIGNIFICANT CHANGE UP (ref 1–3.3)
LYMPHOCYTES # BLD AUTO: 20 % — SIGNIFICANT CHANGE UP (ref 13–44)
MAGNESIUM SERPL-MCNC: 1.8 MG/DL — SIGNIFICANT CHANGE UP (ref 1.6–2.6)
MCHC RBC-ENTMCNC: 27.2 PG — SIGNIFICANT CHANGE UP (ref 27–34)
MCHC RBC-ENTMCNC: 32.4 GM/DL — SIGNIFICANT CHANGE UP (ref 32–36)
MCV RBC AUTO: 84.1 FL — SIGNIFICANT CHANGE UP (ref 80–100)
MONOCYTES # BLD AUTO: 0.51 K/UL — SIGNIFICANT CHANGE UP (ref 0–0.9)
MONOCYTES NFR BLD AUTO: 6.3 % — SIGNIFICANT CHANGE UP (ref 2–14)
NEUTROPHILS # BLD AUTO: 5.69 K/UL — SIGNIFICANT CHANGE UP (ref 1.8–7.4)
NEUTROPHILS NFR BLD AUTO: 70.8 % — SIGNIFICANT CHANGE UP (ref 43–77)
NRBC # BLD: 0 /100 WBCS — SIGNIFICANT CHANGE UP (ref 0–0)
PCO2 BLDA: 53 MMHG — HIGH (ref 35–48)
PH BLDA: 7.4 — SIGNIFICANT CHANGE UP (ref 7.35–7.45)
PLATELET # BLD AUTO: 229 K/UL — SIGNIFICANT CHANGE UP (ref 150–400)
PO2 BLDA: 64 MMHG — LOW (ref 83–108)
POTASSIUM SERPL-MCNC: 4 MMOL/L — SIGNIFICANT CHANGE UP (ref 3.5–5.3)
POTASSIUM SERPL-SCNC: 4 MMOL/L — SIGNIFICANT CHANGE UP (ref 3.5–5.3)
PROT SERPL-MCNC: 6.8 G/DL — SIGNIFICANT CHANGE UP (ref 6–8.3)
RBC # BLD: 4.52 M/UL — SIGNIFICANT CHANGE UP (ref 4.2–5.8)
RBC # FLD: 14.2 % — SIGNIFICANT CHANGE UP (ref 10.3–14.5)
SAO2 % BLDA: 92.4 % — LOW (ref 94–98)
SODIUM SERPL-SCNC: 139 MMOL/L — SIGNIFICANT CHANGE UP (ref 135–145)
T4 FREE SERPL-MCNC: 1 NG/DL — SIGNIFICANT CHANGE UP (ref 0.9–1.8)
TSH SERPL-MCNC: 4.17 UIU/ML — SIGNIFICANT CHANGE UP (ref 0.27–4.2)
WBC # BLD: 8.04 K/UL — SIGNIFICANT CHANGE UP (ref 3.8–10.5)
WBC # FLD AUTO: 8.04 K/UL — SIGNIFICANT CHANGE UP (ref 3.8–10.5)

## 2022-04-08 PROCEDURE — 80061 LIPID PANEL: CPT

## 2022-04-08 PROCEDURE — 83735 ASSAY OF MAGNESIUM: CPT

## 2022-04-08 PROCEDURE — 82803 BLOOD GASES ANY COMBINATION: CPT

## 2022-04-08 PROCEDURE — 71045 X-RAY EXAM CHEST 1 VIEW: CPT

## 2022-04-08 PROCEDURE — 85014 HEMATOCRIT: CPT

## 2022-04-08 PROCEDURE — 36415 COLL VENOUS BLD VENIPUNCTURE: CPT

## 2022-04-08 PROCEDURE — 85018 HEMOGLOBIN: CPT

## 2022-04-08 PROCEDURE — 71275 CT ANGIOGRAPHY CHEST: CPT

## 2022-04-08 PROCEDURE — 83880 ASSAY OF NATRIURETIC PEPTIDE: CPT

## 2022-04-08 PROCEDURE — 93005 ELECTROCARDIOGRAM TRACING: CPT

## 2022-04-08 PROCEDURE — 93455 CORONARY ART/GRFT ANGIO S&I: CPT

## 2022-04-08 PROCEDURE — 36000 PLACE NEEDLE IN VEIN: CPT

## 2022-04-08 PROCEDURE — 82435 ASSAY OF BLOOD CHLORIDE: CPT

## 2022-04-08 PROCEDURE — 82330 ASSAY OF CALCIUM: CPT

## 2022-04-08 PROCEDURE — 82962 GLUCOSE BLOOD TEST: CPT

## 2022-04-08 PROCEDURE — 82947 ASSAY GLUCOSE BLOOD QUANT: CPT

## 2022-04-08 PROCEDURE — 84295 ASSAY OF SERUM SODIUM: CPT

## 2022-04-08 PROCEDURE — C8929: CPT

## 2022-04-08 PROCEDURE — 84439 ASSAY OF FREE THYROXINE: CPT

## 2022-04-08 PROCEDURE — 87637 SARSCOV2&INF A&B&RSV AMP PRB: CPT

## 2022-04-08 PROCEDURE — 94640 AIRWAY INHALATION TREATMENT: CPT

## 2022-04-08 PROCEDURE — 84132 ASSAY OF SERUM POTASSIUM: CPT

## 2022-04-08 PROCEDURE — 80053 COMPREHEN METABOLIC PANEL: CPT

## 2022-04-08 PROCEDURE — C1894: CPT

## 2022-04-08 PROCEDURE — 85025 COMPLETE CBC W/AUTO DIFF WBC: CPT

## 2022-04-08 PROCEDURE — 84484 ASSAY OF TROPONIN QUANT: CPT

## 2022-04-08 PROCEDURE — 99285 EMERGENCY DEPT VISIT HI MDM: CPT | Mod: 25

## 2022-04-08 PROCEDURE — 93455 CORONARY ART/GRFT ANGIO S&I: CPT | Mod: 26

## 2022-04-08 PROCEDURE — C1887: CPT

## 2022-04-08 PROCEDURE — 83605 ASSAY OF LACTIC ACID: CPT

## 2022-04-08 PROCEDURE — 84443 ASSAY THYROID STIM HORMONE: CPT

## 2022-04-08 PROCEDURE — C1769: CPT

## 2022-04-08 PROCEDURE — 83036 HEMOGLOBIN GLYCOSYLATED A1C: CPT

## 2022-04-08 RX ORDER — METFORMIN HYDROCHLORIDE 850 MG/1
1 TABLET ORAL
Qty: 0 | Refills: 0 | DISCHARGE

## 2022-04-08 RX ORDER — INSULIN GLARGINE 100 [IU]/ML
40 INJECTION, SOLUTION SUBCUTANEOUS AT BEDTIME
Refills: 0 | Status: DISCONTINUED | OUTPATIENT
Start: 2022-04-08 | End: 2022-04-08

## 2022-04-08 RX ORDER — FLUTICASONE FUROATE, UMECLIDINIUM BROMIDE AND VILANTEROL TRIFENATATE 200; 62.5; 25 UG/1; UG/1; UG/1
1 POWDER RESPIRATORY (INHALATION)
Qty: 0 | Refills: 0 | DISCHARGE

## 2022-04-08 RX ORDER — TIOTROPIUM BROMIDE 18 UG/1
1 CAPSULE ORAL; RESPIRATORY (INHALATION)
Qty: 1 | Refills: 0
Start: 2022-04-08

## 2022-04-08 RX ORDER — INSULIN LISPRO 100/ML
15 VIAL (ML) SUBCUTANEOUS
Refills: 0 | Status: DISCONTINUED | OUTPATIENT
Start: 2022-04-08 | End: 2022-04-08

## 2022-04-08 RX ADMIN — Medication 20 MILLIGRAM(S): at 05:09

## 2022-04-08 RX ADMIN — BUDESONIDE AND FORMOTEROL FUMARATE DIHYDRATE 2 PUFF(S): 160; 4.5 AEROSOL RESPIRATORY (INHALATION) at 17:19

## 2022-04-08 RX ADMIN — BUPRENORPHINE AND NALOXONE 1 TABLET(S): 2; .5 TABLET SUBLINGUAL at 05:09

## 2022-04-08 RX ADMIN — ENOXAPARIN SODIUM 40 MILLIGRAM(S): 100 INJECTION SUBCUTANEOUS at 17:21

## 2022-04-08 RX ADMIN — Medication 81 MILLIGRAM(S): at 17:20

## 2022-04-08 RX ADMIN — LISINOPRIL 10 MILLIGRAM(S): 2.5 TABLET ORAL at 05:09

## 2022-04-08 RX ADMIN — Medication 2: at 08:32

## 2022-04-08 RX ADMIN — Medication 4: at 12:27

## 2022-04-08 RX ADMIN — TIOTROPIUM BROMIDE 1 CAPSULE(S): 18 CAPSULE ORAL; RESPIRATORY (INHALATION) at 13:32

## 2022-04-08 RX ADMIN — BUDESONIDE AND FORMOTEROL FUMARATE DIHYDRATE 2 PUFF(S): 160; 4.5 AEROSOL RESPIRATORY (INHALATION) at 05:10

## 2022-04-08 RX ADMIN — Medication 48 MILLIGRAM(S): at 12:29

## 2022-04-08 RX ADMIN — Medication 15 UNIT(S): at 12:28

## 2022-04-08 RX ADMIN — BUPRENORPHINE AND NALOXONE 1 TABLET(S): 2; .5 TABLET SUBLINGUAL at 13:39

## 2022-04-08 RX ADMIN — Medication 15 UNIT(S): at 16:37

## 2022-04-08 RX ADMIN — Medication 2: at 16:37

## 2022-04-08 NOTE — PROGRESS NOTE ADULT - PROBLEM SELECTOR PLAN 1
Will increase Lantus to 40u at bedtime.  Will increase Admelog to 15u before each meal and continue Admelog correction scale coverage. Will continue monitoring FS and FU.  Patient counseled for compliance with consistent low carb diet and exercise as tolerated outpatient.

## 2022-04-08 NOTE — PROGRESS NOTE ADULT - ASSESSMENT
Chest pain   sob   improving   ? due to his copd / asthma given wheeze on exam   no evidence of acute MI   fu with pulm   reviewed echo   cath today     CAD s/p cabg   cont asa, statin   plan as above    COPD   nebs  fu with pulm      HTN  cont current meds     DMII  check hga1c  Monitor finger stick. Insulin coverage. Diabetic education and Diabetic diet. Consider nutrition consultation.      Advanced care planning was discussed with patient and family.  Risks, benefits and alternatives of the cardiac treatments and medical therapy including procedures were discussed in detail and all questions were answered. Importance of compliance with medical therapy and lifestyle modification to improve cardiovascular health were addressed. Appropriate forms and patient educational materials were reviewed. 30 minutes face to face spent.

## 2022-04-08 NOTE — PROGRESS NOTE ADULT - SUBJECTIVE AND OBJECTIVE BOX
Chief complaint  Patient is a 66y old  Male who presents with a chief complaint of chest pain (08 Apr 2022 11:12)   Review of systems  Patient in bed, looks comfortable, no hypoglycemic episodes.    Labs and Fingersticks  CAPILLARY BLOOD GLUCOSE      POCT Blood Glucose.: 315 mg/dL (08 Apr 2022 12:15)  POCT Blood Glucose.: 219 mg/dL (08 Apr 2022 08:18)  POCT Blood Glucose.: 265 mg/dL (07 Apr 2022 21:06)  POCT Blood Glucose.: 275 mg/dL (07 Apr 2022 17:05)      Anion Gap, Serum: 13 (04-08 @ 05:13)  Anion Gap, Serum: 10 (04-07 @ 06:36)      Calcium, Total Serum: 9.3 (04-08 @ 05:13)  Calcium, Total Serum: 9.0 (04-07 @ 06:36)  Albumin, Serum: 3.9 (04-08 @ 05:13)  Albumin, Serum: 3.9 (04-07 @ 06:36)    Alanine Aminotransferase (ALT/SGPT): 20 (04-08 @ 05:13)  Alanine Aminotransferase (ALT/SGPT): 23 (04-07 @ 06:36)  Alkaline Phosphatase, Serum: 72 (04-08 @ 05:13)  Alkaline Phosphatase, Serum: 67 (04-07 @ 06:36)  Aspartate Aminotransferase (AST/SGOT): 14 (04-08 @ 05:13)  Aspartate Aminotransferase (AST/SGOT): 15 (04-07 @ 06:36)        04-08    139  |  100  |  20  ----------------------------<  248<H>  4.0   |  26  |  0.80    Ca    9.3      08 Apr 2022 05:13  Mg     1.8     04-08    TPro  6.8  /  Alb  3.9  /  TBili  0.2  /  DBili  x   /  AST  14  /  ALT  20  /  AlkPhos  72  04-08                        12.3   8.04  )-----------( 229      ( 08 Apr 2022 05:13 )             38.0     Medications  MEDICATIONS  (STANDING):  aspirin enteric coated 81 milliGRAM(s) Oral daily  atorvastatin 40 milliGRAM(s) Oral at bedtime  budesonide 160 MICROgram(s)/formoterol 4.5 MICROgram(s) Inhaler 2 Puff(s) Inhalation two times a day  buprenorphine 8 mG/naloxone 2 mG SL  Tablet 1 Tablet(s) SubLingual <User Schedule>  dextrose 5%. 1000 milliLiter(s) (100 mL/Hr) IV Continuous <Continuous>  dextrose 5%. 1000 milliLiter(s) (50 mL/Hr) IV Continuous <Continuous>  dextrose 50% Injectable 25 Gram(s) IV Push once  dextrose 50% Injectable 12.5 Gram(s) IV Push once  dextrose 50% Injectable 25 Gram(s) IV Push once  enoxaparin Injectable 40 milliGRAM(s) SubCutaneous every 24 hours  fenofibrate Tablet 48 milliGRAM(s) Oral daily  furosemide    Tablet 20 milliGRAM(s) Oral daily  glucagon  Injectable 1 milliGRAM(s) IntraMuscular once  insulin glargine Injectable (LANTUS) 40 Unit(s) SubCutaneous at bedtime  insulin lispro (ADMELOG) corrective regimen sliding scale   SubCutaneous three times a day before meals  insulin lispro Injectable (ADMELOG) 15 Unit(s) SubCutaneous three times a day before meals  lisinopril 10 milliGRAM(s) Oral daily  tiotropium 18 MICROgram(s) Capsule 1 Capsule(s) Inhalation daily      Physical Exam  General: Patient comfortable in bed  Vital Signs Last 12 Hrs  T(F): 98 (04-08-22 @ 10:55), Max: 98.2 (04-08-22 @ 04:41)  HR: 84 (04-08-22 @ 11:55) (82 - 92)  BP: 136/75 (04-08-22 @ 11:55) (135/71 - 152/74)  BP(mean): 86 (04-08-22 @ 11:55) (84 - 105)  RR: 17 (04-08-22 @ 11:55) (16 - 19)  SpO2: 92% (04-08-22 @ 11:55) (92% - 94%)  Neck: No palpable thyroid nodules.  CVS: S1S2, No murmurs  Respiratory: No wheezing, no crepitations  GI: Abdomen soft, bowel sounds positive  Musculoskeletal:  edema lower extremities.   Skin: No skin rashes, no ecchymosis    Diagnostics    Free Thyroxine, Serum: AM Sched. Collection: 08-Apr-2022 06:00 (04-07 @ 10:09)  Thyroid Stimulating Hormone, Serum: AM Sched. Collection: 08-Apr-2022 06:00 (04-07 @ 10:09)           Chief complaint  Patient is a 66y old  Male who presents with a chief complaint of chest pain (08 Apr 2022 11:12)   Review of systems  Patient in bed, looks comfortable, no hypoglycemic episodes.    Labs and Fingersticks  CAPILLARY BLOOD GLUCOSE      POCT Blood Glucose.: 315 mg/dL (08 Apr 2022 12:15)  POCT Blood Glucose.: 219 mg/dL (08 Apr 2022 08:18)  POCT Blood Glucose.: 265 mg/dL (07 Apr 2022 21:06)  POCT Blood Glucose.: 275 mg/dL (07 Apr 2022 17:05)      Anion Gap, Serum: 13 (04-08 @ 05:13)  Anion Gap, Serum: 10 (04-07 @ 06:36)      Calcium, Total Serum: 9.3 (04-08 @ 05:13)  Calcium, Total Serum: 9.0 (04-07 @ 06:36)  Albumin, Serum: 3.9 (04-08 @ 05:13)  Albumin, Serum: 3.9 (04-07 @ 06:36)    Alanine Aminotransferase (ALT/SGPT): 20 (04-08 @ 05:13)  Alanine Aminotransferase (ALT/SGPT): 23 (04-07 @ 06:36)  Alkaline Phosphatase, Serum: 72 (04-08 @ 05:13)  Alkaline Phosphatase, Serum: 67 (04-07 @ 06:36)  Aspartate Aminotransferase (AST/SGOT): 14 (04-08 @ 05:13)  Aspartate Aminotransferase (AST/SGOT): 15 (04-07 @ 06:36)        04-08    139  |  100  |  20  ----------------------------<  248<H>  4.0   |  26  |  0.80    Ca    9.3      08 Apr 2022 05:13  Mg     1.8     04-08    TPro  6.8  /  Alb  3.9  /  TBili  0.2  /  DBili  x   /  AST  14  /  ALT  20  /  AlkPhos  72  04-08                        12.3   8.04  )-----------( 229      ( 08 Apr 2022 05:13 )             38.0     Medications  MEDICATIONS  (STANDING):  aspirin enteric coated 81 milliGRAM(s) Oral daily  atorvastatin 40 milliGRAM(s) Oral at bedtime  budesonide 160 MICROgram(s)/formoterol 4.5 MICROgram(s) Inhaler 2 Puff(s) Inhalation two times a day  buprenorphine 8 mG/naloxone 2 mG SL  Tablet 1 Tablet(s) SubLingual <User Schedule>  dextrose 5%. 1000 milliLiter(s) (100 mL/Hr) IV Continuous <Continuous>  dextrose 5%. 1000 milliLiter(s) (50 mL/Hr) IV Continuous <Continuous>  dextrose 50% Injectable 25 Gram(s) IV Push once  dextrose 50% Injectable 12.5 Gram(s) IV Push once  dextrose 50% Injectable 25 Gram(s) IV Push once  enoxaparin Injectable 40 milliGRAM(s) SubCutaneous every 24 hours  fenofibrate Tablet 48 milliGRAM(s) Oral daily  furosemide    Tablet 20 milliGRAM(s) Oral daily  glucagon  Injectable 1 milliGRAM(s) IntraMuscular once  insulin glargine Injectable (LANTUS) 40 Unit(s) SubCutaneous at bedtime  insulin lispro (ADMELOG) corrective regimen sliding scale   SubCutaneous three times a day before meals  insulin lispro Injectable (ADMELOG) 15 Unit(s) SubCutaneous three times a day before meals  lisinopril 10 milliGRAM(s) Oral daily  tiotropium 18 MICROgram(s) Capsule 1 Capsule(s) Inhalation daily      Physical Exam  General: Patient comfortable in bed  Vital Signs Last 12 Hrs  T(F): 98 (04-08-22 @ 10:55), Max: 98.2 (04-08-22 @ 04:41)  HR: 84 (04-08-22 @ 11:55) (82 - 92)  BP: 136/75 (04-08-22 @ 11:55) (135/71 - 152/74)  BP(mean): 86 (04-08-22 @ 11:55) (84 - 105)  RR: 17 (04-08-22 @ 11:55) (16 - 19)  SpO2: 92% (04-08-22 @ 11:55) (92% - 94%)  Neck: No palpable thyroid nodules.  CVS: S1S2, No murmurs  Respiratory: No wheezing, no crepitations  GI: Abdomen soft, bowel sounds positive  Musculoskeletal:  edema lower extremities.   Skin: No skin rashes, no ecchymosis    Diagnostics    Free Thyroxine, Serum: AM Sched. Collection: 08-Apr-2022 06:00 (04-07 @ 10:09)  Thyroid Stimulating Hormone, Serum: AM Sched. Collection: 08-Apr-2022 06:00 (04-07 @ 10:09)

## 2022-04-08 NOTE — PROGRESS NOTE ADULT - ASSESSMENT
Assessment  DMT2: 66y Male with DM T2 with hyperglycemia, A1C 9.1%, was on insulin at home, now on basal bolus insulin, increased dose yesterday, blood sugars still running high and not at target, no hypoglycemic episodes, cardiac workup in progress, no acute events.  Chest Pain: workup in progress, stable, monitored.  HTN: Controlled,  on antihypertensive medications.  Obesity: No strict exercise routines, not on any weight loss program, neither on low calorie diet.        Diego Stovall MD  Cell: [phone]  Office: [phone]     Assessment  DMT2: 66y Male with DM T2 with hyperglycemia, A1C 9.1%, was on insulin at home, now on basal bolus insulin, increased dose yesterday, blood sugars still running high and  not at target, no hypoglycemic episodes, cardiac workup in progress, no acute events.  Chest Pain: workup in progress, stable, monitored.  HTN: Controlled,  on antihypertensive medications.  Obesity: No strict exercise routines, not on any weight loss program, neither on low calorie diet.        Diego Stovall MD  Cell: [phone]  Office: [phone]

## 2022-04-08 NOTE — PROGRESS NOTE ADULT - ASSESSMENT
66M w/ PMHx of CAD s/p triple cardiac bypass, HTN, HLD, COPD p/w chest pain and SOB.  Pt. states he had two edible cookies for the first time and was became very drowsy.  Pt. then noticed he had a sudden onset chest pain that he describes as "someone pushing down on my chest."  No active CP during time of evaluation.  States pain feels similar to prior episode of MI.  Denies any n/v, diaphoresis.  Denies any f/c, sick contacts, abd pain, urinary sxs, rectal bleeding.  Denies concomitant etoh or other drugs    1 Chest pain  2 Substance abuse  3 DM  4: copd:     Lovenox for DVT prophylaxis     he seems to be doing ok : he came in with after weed ingestion:  then he developed chest pain:  he has ac on chr mild hypercapnic resp failure  he is an active smoker:   ct chest  contrast: as he did complainof sob on admission: currently heis doing ok:  last time it showed bronchitis and bronchiolitis:   cont BD;  adv to avoid weed!  DVT propahxykis    4/7:    he seems to be doing ok : he came in with after weed ingestion: no evetns overnight: ct scan chest noted: looks better then last time: no PE , though somewhat limited! : grossly normal LV fucntion  he has ac on chr mild hypercapnic resp failure  he is an active smoker:   ct chest  contrast: as he did complain of sob on admission: currently he is doing ok:  last time it showed bronchitis and bronchiolitis: nre ct chest noted: seems to be better: adv strongly to stop smoking@  cont BD;  adv to avoid weed!  DVT prophylaxis    4/8:  he seems to be doing ok : he came in with after weed ingestion: no evetns overnight: ct scan chest noted: looks better then last time: no PE , though somewhat limited! : grossly normal LV fucntion" for cath today   he has ac on chr mild hypercapnic resp failure: abg today with chr compensated hypercarbia: he is obese too: HE NEEDS FULL PFT AS AN OUTPT   he is an active smoker:   ct chest  contrast: as he did complain of sob on admission: currently he is doing ok:  last time it showed bronchitis and bronchiolitis: nre ct chest noted: seems to be better: adv strongly to stop smoking@  cont BD;  adv to avoid weed!  DVT prophylaxis  DW ACP

## 2022-04-08 NOTE — DISCHARGE NOTE NURSING/CASE MANAGEMENT/SOCIAL WORK - PATIENT PORTAL LINK FT
You can access the FollowMyHealth Patient Portal offered by [facility] by registering at the following website: [email or web address]. By joining [facility] FollowMyHealth portal, you will also be able to view your health information using other applications (apps) compatible with our system.

## 2022-04-08 NOTE — PROGRESS NOTE ADULT - SUBJECTIVE AND OBJECTIVE BOX
Date of Service: 04-08-22 @ 11:12    Patient is a 66y old  Male who presents with a chief complaint of chest pain (08 Apr 2022 08:48)      Any change in ROS:  doing ok : for cath :   no overnight evetn s:     MEDICATIONS  (STANDING):  aspirin enteric coated 81 milliGRAM(s) Oral daily  atorvastatin 40 milliGRAM(s) Oral at bedtime  budesonide 160 MICROgram(s)/formoterol 4.5 MICROgram(s) Inhaler 2 Puff(s) Inhalation two times a day  buprenorphine 8 mG/naloxone 2 mG SL  Tablet 1 Tablet(s) SubLingual <User Schedule>  dextrose 5%. 1000 milliLiter(s) (100 mL/Hr) IV Continuous <Continuous>  dextrose 5%. 1000 milliLiter(s) (50 mL/Hr) IV Continuous <Continuous>  dextrose 50% Injectable 25 Gram(s) IV Push once  dextrose 50% Injectable 12.5 Gram(s) IV Push once  dextrose 50% Injectable 25 Gram(s) IV Push once  enoxaparin Injectable 40 milliGRAM(s) SubCutaneous every 24 hours  fenofibrate Tablet 48 milliGRAM(s) Oral daily  furosemide    Tablet 20 milliGRAM(s) Oral daily  glucagon  Injectable 1 milliGRAM(s) IntraMuscular once  insulin glargine Injectable (LANTUS) 40 Unit(s) SubCutaneous at bedtime  insulin lispro (ADMELOG) corrective regimen sliding scale   SubCutaneous three times a day before meals  insulin lispro Injectable (ADMELOG) 15 Unit(s) SubCutaneous three times a day before meals  lisinopril 10 milliGRAM(s) Oral daily  tiotropium 18 MICROgram(s) Capsule 1 Capsule(s) Inhalation daily    MEDICATIONS  (PRN):  ALBUTerol    90 MICROgram(s) HFA Inhaler 2 Puff(s) Inhalation every 6 hours PRN Shortness of Breath and/or Wheezing  dextrose Oral Gel 15 Gram(s) Oral once PRN Blood Glucose LESS THAN 70 milliGRAM(s)/deciliter    Vital Signs Last 24 Hrs  T(C): 36.7 (08 Apr 2022 10:55), Max: 37.2 (07 Apr 2022 14:10)  T(F): 98 (08 Apr 2022 10:55), Max: 98.9 (07 Apr 2022 14:10)  HR: 92 (08 Apr 2022 10:55) (86 - 99)  BP: 139/95 (08 Apr 2022 10:55) (131/73 - 142/86)  BP(mean): 105 (08 Apr 2022 10:55) (105 - 105)  RR: 17 (08 Apr 2022 09:42) (16 - 19)  SpO2: 93% (08 Apr 2022 10:55) (93% - 94%)    I&O's Summary    07 Apr 2022 07:01  -  08 Apr 2022 07:00  --------------------------------------------------------  IN: 1320 mL / OUT: 0 mL / NET: 1320 mL          Physical Exam:   GENERAL: Obese+  HEENT: [place]/   Atraumatic, Normocephalic  ENMT: No tonsillar erythema, exudates, or enlargement; Moist mucous membranes, Good dentition, No lesions  NECK: Supple, No JVD, Normal thyroid  CHEST/LUNG: Clear to auscultaion, ; No rales, rhonchi, wheezing, or rubs  CVS: Regular rate and rhythm; No murmurs, rubs, or gallops  GI: : Soft, Nontender, Nondistended; Bowel sounds present  NERVOUS SYSTEM:  Alert & Oriented X3  EXTREMITIES: -edema  LYMPH: No lymphadenopathy noted  SKIN: No rashes or lesions  ENDOCRINOLOGY: No Thyromegaly  PSYCH: Appropriate    Labs:  ABG - ( 08 Apr 2022 05:19 )  pH, Arterial: 7.40  pH, Blood: x     /  pCO2: 53    /  pO2: 64    / HCO3: 33    / Base Excess: 6.5   /  SaO2: 92.4            34                            12.3   8.04  )-----------( 229      ( 08 Apr 2022 05:13 )             38.0                         12.6   7.34  )-----------( 234      ( 07 Apr 2022 06:36 )             39.7                         13.2   13.25 )-----------( 263      ( 06 Apr 2022 02:05 )             40.8     04-08    139  |  100  |  20  ----------------------------<  248<H>  4.0   |  26  |  0.80  04-07    138  |  100  |  18  ----------------------------<  233<H>  4.1   |  28  |  0.76  04-06    138  |  98  |  12  ----------------------------<  224<H>  4.3   |  30  |  0.78    Ca    9.3      08 Apr 2022 05:13  Ca    9.0      07 Apr 2022 06:36  Mg     1.8     04-08  Mg     1.9     04-07    TPro  6.8  /  Alb  3.9  /  TBili  0.2  /  DBili  x   /  AST  14  /  ALT  20  /  AlkPhos  72  04-08  TPro  6.5  /  Alb  3.9  /  TBili  0.3  /  DBili  x   /  AST  15  /  ALT  23  /  AlkPhos  67  04-07  TPro  7.4  /  Alb  4.3  /  TBili  0.2  /  DBili  x   /  AST  22  /  ALT  27  /  AlkPhos  81  04-06    CAPILLARY BLOOD GLUCOSE      POCT Blood Glucose.: 219 mg/dL (08 Apr 2022 08:18)  POCT Blood Glucose.: 265 mg/dL (07 Apr 2022 21:06)  POCT Blood Glucose.: 275 mg/dL (07 Apr 2022 17:05)  POCT Blood Glucose.: 241 mg/dL (07 Apr 2022 11:46)      LIVER FUNCTIONS - ( 08 Apr 2022 05:13 )  Alb: 3.9 g/dL / Pro: 6.8 g/dL / ALK PHOS: 72 U/L / ALT: 20 U/L / AST: 14 U/L / GGT: x               Serum Pro-Brain Natriuretic Peptide: 114 pg/mL (04-06 @ 02:05)        RECENT CULTURES:    rad< from: CT Angio Chest PE Protocol w/ IV Cont (04.06.22 @ 21:54) >  subsegmental bronchial impaction in the right lower lobe. Previously seen   associated tree-in-bud nodularity in this region has improved since   September 2020.    Mediastinum/Lymph nodes: No thoracic adenopathy. Unremarkable thyroid.    Upper Abdomen: Hepatic steatosis.    Bones and Soft Tissues: No aggressive osseous lesions. Small   intramuscular lipoma anterior to the right scapula.    IMPRESSION:  No pulmonary embolism, although limited evaluation of several   segmental/subsegmental branches.    Right lower lobe bronchial impaction. Improved tree-in-bud nodularity   since September 2020.    --- End of Report ---            FABIAN MANZANO M.D., Attending Radiologist  This document has been electronically signed. Apr 7 2022  8:33AM    < end of copied text >  < from: CT Angio Chest PE Protocol w/ IV Cont (04.06.22 @ 21:54) >  subsegmental bronchial impaction in the right lower lobe. Previously seen   associated tree-in-bud nodularity in this region has improved since   September 2020.    Mediastinum/Lymph nodes: No thoracic adenopathy. Unremarkable thyroid.    Upper Abdomen: Hepatic steatosis.    Bones and Soft Tissues: No aggressive osseous lesions. Small   intramuscular lipoma anterior to the right scapula.    IMPRESSION:  No pulmonary embolism, although limited evaluation of several   segmental/subsegmental branches.    Right lower lobe bronchial impaction. Improved tree-in-bud nodularity   since September 2020.    --- End of Report ---            FABIAN MANZANO M.D., Attending Radiologist  This document has been electronically signed. Apr 7 2022  8:33AM    < end of copied text >      RESPIRATORY CULTURES:          Studies  Chest X-RAY  CT SCAN Chest   Venous Dopplers: LE:   CT Abdomen  Others

## 2022-04-08 NOTE — PROGRESS NOTE ADULT - SUBJECTIVE AND OBJECTIVE BOX
Patient is a 66y old  Male who presents with a chief complaint of chest pain (08 Apr 2022 13:17)    Date of servie : 04-08-22 @ 14:40  INTERVAL HPI/OVERNIGHT EVENTS:  T(C): 36.8 (04-08-22 @ 12:18), Max: 36.9 (04-07-22 @ 21:12)  HR: 85 (04-08-22 @ 12:18) (82 - 99)  BP: 147/80 (04-08-22 @ 12:18) (135/71 - 152/74)  RR: 18 (04-08-22 @ 12:18) (16 - 19)  SpO2: 94% (04-08-22 @ 12:18) (92% - 94%)  Wt(kg): --  I&O's Summary    07 Apr 2022 07:01  -  08 Apr 2022 07:00  --------------------------------------------------------  IN: 1320 mL / OUT: 0 mL / NET: 1320 mL    08 Apr 2022 07:01  -  08 Apr 2022 14:40  --------------------------------------------------------  IN: 780 mL / OUT: 0 mL / NET: 780 mL        LABS:                        12.3   8.04  )-----------( 229      ( 08 Apr 2022 05:13 )             38.0     04-08    139  |  100  |  20  ----------------------------<  248<H>  4.0   |  26  |  0.80    Ca    9.3      08 Apr 2022 05:13  Mg     1.8     04-08    TPro  6.8  /  Alb  3.9  /  TBili  0.2  /  DBili  x   /  AST  14  /  ALT  20  /  AlkPhos  72  04-08        CAPILLARY BLOOD GLUCOSE      POCT Blood Glucose.: 315 mg/dL (08 Apr 2022 12:15)  POCT Blood Glucose.: 219 mg/dL (08 Apr 2022 08:18)  POCT Blood Glucose.: 265 mg/dL (07 Apr 2022 21:06)  POCT Blood Glucose.: 275 mg/dL (07 Apr 2022 17:05)    ABG - ( 08 Apr 2022 05:19 )  pH, Arterial: 7.40  pH, Blood: x     /  pCO2: 53    /  pO2: 64    / HCO3: 33    / Base Excess: 6.5   /  SaO2: 92.4                    MEDICATIONS  (STANDING):  aspirin enteric coated 81 milliGRAM(s) Oral daily  atorvastatin 40 milliGRAM(s) Oral at bedtime  budesonide 160 MICROgram(s)/formoterol 4.5 MICROgram(s) Inhaler 2 Puff(s) Inhalation two times a day  buprenorphine 8 mG/naloxone 2 mG SL  Tablet 1 Tablet(s) SubLingual <User Schedule>  dextrose 5%. 1000 milliLiter(s) (100 mL/Hr) IV Continuous <Continuous>  dextrose 5%. 1000 milliLiter(s) (50 mL/Hr) IV Continuous <Continuous>  dextrose 50% Injectable 25 Gram(s) IV Push once  dextrose 50% Injectable 12.5 Gram(s) IV Push once  dextrose 50% Injectable 25 Gram(s) IV Push once  enoxaparin Injectable 40 milliGRAM(s) SubCutaneous every 24 hours  fenofibrate Tablet 48 milliGRAM(s) Oral daily  furosemide    Tablet 20 milliGRAM(s) Oral daily  glucagon  Injectable 1 milliGRAM(s) IntraMuscular once  insulin glargine Injectable (LANTUS) 40 Unit(s) SubCutaneous at bedtime  insulin lispro (ADMELOG) corrective regimen sliding scale   SubCutaneous three times a day before meals  insulin lispro Injectable (ADMELOG) 15 Unit(s) SubCutaneous three times a day before meals  lisinopril 10 milliGRAM(s) Oral daily  tiotropium 18 MICROgram(s) Capsule 1 Capsule(s) Inhalation daily    MEDICATIONS  (PRN):  ALBUTerol    90 MICROgram(s) HFA Inhaler 2 Puff(s) Inhalation every 6 hours PRN Shortness of Breath and/or Wheezing  dextrose Oral Gel 15 Gram(s) Oral once PRN Blood Glucose LESS THAN 70 milliGRAM(s)/deciliter          PHYSICAL EXAM:  GENERAL: NAD, well-groomed, well-developed  HEAD:  Atraumatic, Normocephalic  CHEST/LUNG: Clear to percussion bilaterally; No rales, rhonchi, wheezing, or rubs  HEART: Regular rate and rhythm; No murmurs, rubs, or gallops  ABDOMEN: Soft, Nontender, Nondistended; Bowel sounds present  EXTREMITIES:  2+ Peripheral Pulses, No clubbing, cyanosis, or edema  LYMPH: No lymphadenopathy noted  SKIN: No rashes or lesions    Care Discussed with Consultants/Other Providers [ ] YES  [ ] NO

## 2022-04-08 NOTE — PROGRESS NOTE ADULT - ASSESSMENT
66M w/ PMHx of CAD s/p triple cardiac bypass, HTN, HLD, COPD p/w chest pain and SOB.  Pt. states he had two edible cookies for the first time and was became very drowsy.  Pt. then noticed he had a sudden onset chest pain that he describes as "someone pushing down on my chest."  No active CP during time of evaluation.  States pain feels similar to prior episode of MI.  Denies any n/v, diaphoresis.  Denies any f/c, sick contacts, abd pain, urinary sxs, rectal bleeding.  Denies concomitant etoh or other drugs    1 Chest pain  - telemonitor   - ro ACS  - Cards fu  - cw asa, statin  - ischemia lópez as per cards  - cath today     2 Substance abuse  - cw Suboxone  - willl monitor     3 DM  - monitor FS  - basal, bolus    Lovenox for DVT prophylaxis

## 2022-04-08 NOTE — DISCHARGE NOTE NURSING/CASE MANAGEMENT/SOCIAL WORK - NSDCPEFALRISK_GEN_ALL_CORE
For information on Fall & Injury Prevention, visit: [email or web address] OR  [email or web address] OR  https://www.cdc.gov/steadi/patient.html

## 2022-04-08 NOTE — PROGRESS NOTE ADULT - SUBJECTIVE AND OBJECTIVE BOX
DATE OF SERVICE: 04-08-22 @ 08:48    Subjective: Patient seen and examined. No new events except as noted.     SUBJECTIVE/ROS:  feels better       MEDICATIONS:  MEDICATIONS  (STANDING):  aspirin enteric coated 81 milliGRAM(s) Oral daily  atorvastatin 40 milliGRAM(s) Oral at bedtime  budesonide 160 MICROgram(s)/formoterol 4.5 MICROgram(s) Inhaler 2 Puff(s) Inhalation two times a day  buprenorphine 8 mG/naloxone 2 mG SL  Tablet 1 Tablet(s) SubLingual <User Schedule>  dextrose 5%. 1000 milliLiter(s) (100 mL/Hr) IV Continuous <Continuous>  dextrose 5%. 1000 milliLiter(s) (50 mL/Hr) IV Continuous <Continuous>  dextrose 50% Injectable 25 Gram(s) IV Push once  dextrose 50% Injectable 12.5 Gram(s) IV Push once  dextrose 50% Injectable 25 Gram(s) IV Push once  enoxaparin Injectable 40 milliGRAM(s) SubCutaneous every 24 hours  fenofibrate Tablet 48 milliGRAM(s) Oral daily  furosemide    Tablet 20 milliGRAM(s) Oral daily  glucagon  Injectable 1 milliGRAM(s) IntraMuscular once  insulin glargine Injectable (LANTUS) 40 Unit(s) SubCutaneous at bedtime  insulin lispro (ADMELOG) corrective regimen sliding scale   SubCutaneous three times a day before meals  insulin lispro Injectable (ADMELOG) 15 Unit(s) SubCutaneous three times a day before meals  lisinopril 10 milliGRAM(s) Oral daily  tiotropium 18 MICROgram(s) Capsule 1 Capsule(s) Inhalation daily      PHYSICAL EXAM:  T(C): 36.8 (04-08-22 @ 04:41), Max: 37.2 (04-07-22 @ 14:10)  HR: 86 (04-08-22 @ 04:41) (86 - 99)  BP: 138/81 (04-08-22 @ 04:41) (131/73 - 142/86)  RR: 19 (04-08-22 @ 04:41) (16 - 19)  SpO2: 94% (04-08-22 @ 04:41) (93% - 94%)  Wt(kg): --  I&O's Summary    07 Apr 2022 07:01  -  08 Apr 2022 07:00  --------------------------------------------------------  IN: 1320 mL / OUT: 0 mL / NET: 1320 mL            JVP: Normal  Neck: supple  Lung: clear   CV: S1 S2 , Murmur:  Abd: soft  Ext: No edema  neuro: Awake / alert  Psych: flat affect  Skin: normal``    LABS/DATA:    CARDIAC MARKERS:                                12.3   8.04  )-----------( 229      ( 08 Apr 2022 05:13 )             38.0     04-08    139  |  100  |  20  ----------------------------<  248<H>  4.0   |  26  |  0.80    Ca    9.3      08 Apr 2022 05:13  Mg     1.8     04-08    TPro  6.8  /  Alb  3.9  /  TBili  0.2  /  DBili  x   /  AST  14  /  ALT  20  /  AlkPhos  72  04-08    proBNP:   Lipid Profile:   HgA1c:   TSH:     TELE:  EKG:

## 2022-04-09 NOTE — CHART NOTE - NSCHARTNOTEFT_GEN_A_CORE
1 attempt was made to reach patient, which have been unsuccessful. Unable to leave voicemail on (4/9/22).
Patient requested call back on 4/9/22.
Called the 347 number for the patient, it went to voicemail, and couldn't leave a message because the inbox was full. Called the hospital and asked to be connected to the patient. Kept ringing for a few minutes and no one would .
Patient requested call back on 4/13 after 12 PM

## 2022-04-15 ENCOUNTER — NON-APPOINTMENT (OUTPATIENT)
Age: 67
End: 2022-04-15

## 2022-04-25 ENCOUNTER — RX RENEWAL (OUTPATIENT)
Age: 67
End: 2022-04-25

## 2022-04-28 ENCOUNTER — APPOINTMENT (OUTPATIENT)
Dept: PULMONOLOGY | Facility: CLINIC | Age: 67
End: 2022-04-28
Payer: MEDICAID

## 2022-04-28 VITALS
TEMPERATURE: 95 F | WEIGHT: 277 LBS | SYSTOLIC BLOOD PRESSURE: 130 MMHG | DIASTOLIC BLOOD PRESSURE: 78 MMHG | HEART RATE: 116 BPM | OXYGEN SATURATION: 96 % | BODY MASS INDEX: 39.75 KG/M2

## 2022-04-28 PROCEDURE — 99406 BEHAV CHNG SMOKING 3-10 MIN: CPT

## 2022-04-28 PROCEDURE — 99214 OFFICE O/P EST MOD 30 MIN: CPT | Mod: 25

## 2022-04-28 RX ORDER — ALBUTEROL SULFATE 90 UG/1
108 (90 BASE) INHALANT RESPIRATORY (INHALATION)
Qty: 1 | Refills: 1 | Status: ACTIVE | COMMUNITY
Start: 2020-12-01 | End: 1900-01-01

## 2022-04-28 RX ORDER — FLUTICASONE FUROATE, UMECLIDINIUM BROMIDE AND VILANTEROL TRIFENATATE 100; 62.5; 25 UG/1; UG/1; UG/1
100-62.5-25 POWDER RESPIRATORY (INHALATION)
Qty: 1 | Refills: 3 | Status: ACTIVE | COMMUNITY
Start: 2021-03-02 | End: 1900-01-01

## 2022-04-28 NOTE — HISTORY OF PRESENT ILLNESS
[Current] : current [>= 20 pack years] : >= 20 pack years [TextBox_4] : 67 yo male with hx of OAD and SHAYY, presents for follow up. The patient was hospitalized at [facility] a few weeks ago for chest pain. LHC was unchanged. CTPA did not reveal any filling defects. He continues to smoke five cigarettes daily. He claims to feels "good" on  daily trelegy with PRN albuterol MDI. He has not received PAP machine as ordered because he was away in [place]. He continues to have sleep related complaints. [TextBox_11] : 1 [Awakes Unrefreshed] : awakes unrefreshed [TextBox_13] : 30 [Awakes with Dry Mouth] : awakes with dry mouth [Daytime Somnolence] : daytime somnolence [Fatigue] : fatigue [Snoring] : snoring

## 2022-04-28 NOTE — DISCUSSION/SUMMARY
[FreeTextEntry1] : 65 yo with OAD, at baseline on ICS/LABA/LAMA. Smoking cessation was again stressed. I reviewed the chest CT images on line, performed during recent hospitalization, and discussed the findings with the patient. A repeat low dose screening chest CT will be performed in one year. His PAP machine will again be ordered. The importance of treating SHAYY was discussed at great length.He is to avoid sedative/hypnotic meds and excessive alcohol use. He is to follow up with his PMD as before.

## 2022-04-28 NOTE — PHYSICAL EXAM
[No Acute Distress] : no acute distress [Normal Oropharynx] : normal oropharynx [Normal Appearance] : normal appearance [No Neck Mass] : no neck mass [Normal Rate/Rhythm] : normal rate/rhythm [Normal S1, S2] : normal s1, s2 [No Murmurs] : no murmurs [No Resp Distress] : no resp distress [Wheeze] : wheeze [No Abnormalities] : no abnormalities [Benign] : benign [Normal Gait] : normal gait [No Clubbing] : no clubbing [No Cyanosis] : no cyanosis [No Edema] : no edema [FROM] : FROM [Normal Color/ Pigmentation] : normal color/ pigmentation [No Focal Deficits] : no focal deficits [Oriented x3] : oriented x3 [Normal Affect] : normal affect [TextBox_68] : Decreased breath sounds bilaterally

## 2022-04-28 NOTE — REVIEW OF SYSTEMS
[Fatigue] : fatigue [Cough] : no cough [Sputum] : no sputum [Wheezing] : no wheezing [Diabetes] : diabetes [Negative] : Psychiatric

## 2022-04-28 NOTE — COUNSELING
[Cessation strategies including cessation program discussed] : Cessation strategies including cessation program discussed [Use of nicotine replacement therapies and other medications discussed] : Use of nicotine replacement therapies and other medications discussed [Yes] : Willing to quit smoking [FreeTextEntry1] : 10

## 2022-05-02 ENCOUNTER — APPOINTMENT (OUTPATIENT)
Dept: INTERNAL MEDICINE | Facility: CLINIC | Age: 67
End: 2022-05-02
Payer: SELF-PAY

## 2022-05-02 PROCEDURE — 99499D: CUSTOM

## 2022-05-02 NOTE — ED ADULT TRIAGE NOTE - MODE OF ARRIVAL
Spoke with pt about current symptoms and BP's. Pt denies any chest pain or SOB at this time. She states these BP's are high for her as she is typically in the 90's.     Dr. Mohamud reviewed symptoms and BP's and states pt should follow up in 1 month (June 6) with labs and EKG prior.     Pt is in agreement with this plan. We did discuss if her chest pain comes back or if her BP's remain elevated that she should go to the ED right away. Pt reports understanding.     Mino Guthrie, ANNALISA   Cardiology Nurse Coordinator        [facility]

## 2022-05-26 ENCOUNTER — RX RENEWAL (OUTPATIENT)
Age: 67
End: 2022-05-26

## 2022-06-02 ENCOUNTER — APPOINTMENT (OUTPATIENT)
Dept: INTERNAL MEDICINE | Facility: CLINIC | Age: 67
End: 2022-06-02
Payer: SELF-PAY

## 2022-06-02 VITALS
HEART RATE: 116 BPM | DIASTOLIC BLOOD PRESSURE: 80 MMHG | HEIGHT: 70 IN | WEIGHT: 282 LBS | RESPIRATION RATE: 16 BRPM | TEMPERATURE: 96 F | SYSTOLIC BLOOD PRESSURE: 130 MMHG | BODY MASS INDEX: 40.37 KG/M2 | OXYGEN SATURATION: 97 %

## 2022-06-02 PROCEDURE — 99499D: CUSTOM

## 2022-06-26 ENCOUNTER — RX RENEWAL (OUTPATIENT)
Age: 67
End: 2022-06-26

## 2022-06-27 ENCOUNTER — APPOINTMENT (OUTPATIENT)
Dept: INTERNAL MEDICINE | Facility: CLINIC | Age: 67
End: 2022-06-27
Payer: MEDICAID

## 2022-06-27 VITALS
HEIGHT: 70.47 IN | DIASTOLIC BLOOD PRESSURE: 78 MMHG | OXYGEN SATURATION: 97 % | HEART RATE: 107 BPM | WEIGHT: 281.93 LBS | SYSTOLIC BLOOD PRESSURE: 115 MMHG | TEMPERATURE: 97.9 F | BODY MASS INDEX: 39.91 KG/M2

## 2022-06-27 DIAGNOSIS — Z01.818 ENCOUNTER FOR OTHER PREPROCEDURAL EXAMINATION: ICD-10-CM

## 2022-06-27 LAB — GLUCOSE BLDC GLUCOMTR-MCNC: 159

## 2022-06-27 PROCEDURE — 99214 OFFICE O/P EST MOD 30 MIN: CPT | Mod: 25

## 2022-06-27 NOTE — HISTORY OF PRESENT ILLNESS
[Coronary Artery Disease] : coronary artery disease [COPD] : COPD [Sleep Apnea] : sleep apnea [Smoker] : smoker [No Adverse Anesthesia Reaction] : no adverse anesthesia reaction in self or family member [Diabetes] : diabetes [(Patient denies any chest pain, claudication, dyspnea on exertion, orthopnea, palpitations or syncope)] : Patient denies any chest pain, claudication, dyspnea on exertion, orthopnea, palpitations or syncope [Aortic Stenosis] : no aortic stenosis [Atrial Fibrillation] : no atrial fibrillation [Recent Myocardial Infarction] : no recent myocardial infarction [Implantable Device/Pacemaker] : no implantable device/pacemaker [Asthma] : no asthma [Chronic Anticoagulation] : no chronic anticoagulation [Chronic Kidney Disease] : no chronic kidney disease [Moderate (4-6 METs)] : Moderate (4-6 METs) [FreeTextEntry1] : Tooth extraction [FreeTextEntry4] : Patient presents for medical clearance for his tooth extraction. Notes that the date of procedure will be scheduled when he pass the clearance form.\cecilia Pt reports he was in the hospital 3 months ago after getting back from [place].  States he was not feeling good.\cecilia Had angiogram which was WNL.\cecilia Pt has been taking ASA.\par He thinks that his sugar is High, with 220s-250s. \cecilia Does f/u with endocrinologist. A1c was 9 about 3 months ago and was advised to increase insulin.\cecilia Pt does walk around, however gets SOB on stairs. [FreeTextEntry7] : Had coronary angiogram done in April 2022 with no significant coronary artery disease

## 2022-06-27 NOTE — ADDENDUM
[FreeTextEntry1] : I, Uzma Miranda, acted as a scribe on behalf of Dr. Ramon Mosqueda MD, on 06/27/2022. \par \par All medical entries made by the scribe were at my, Dr. Ramon Mosqueda MD, direction and personally dictated by me on 06/27/2022. I have reviewed the chart and agree that the record accurately reflects my personal performance of the history, physical exam, assessment and plan. I have also personally directed, reviewed, and agreed with the chart.

## 2022-06-27 NOTE — ASSESSMENT
[Patient Optimized for Surgery] : Patient optimized for surgery [No Further Testing Recommended] : no further testing recommended [FreeTextEntry4] : -Blood work done today.\par -Advised to discontinue taking ASA for 3 days prior to procedure. \par -Increase insulin by 5 units. -F/u with endocrinology.\par -POC: glucose 159 spouse

## 2022-06-30 LAB
ALBUMIN SERPL ELPH-MCNC: 4.7 G/DL
ALP BLD-CCNC: 82 U/L
ALT SERPL-CCNC: 33 U/L
ANION GAP SERPL CALC-SCNC: 14 MMOL/L
APTT BLD: 29.9 SEC
AST SERPL-CCNC: 23 U/L
BASOPHILS # BLD AUTO: 0.05 K/UL
BASOPHILS NFR BLD AUTO: 0.6 %
BILIRUB SERPL-MCNC: 0.2 MG/DL
BUN SERPL-MCNC: 16 MG/DL
CALCIUM SERPL-MCNC: 10 MG/DL
CHLORIDE SERPL-SCNC: 103 MMOL/L
CO2 SERPL-SCNC: 26 MMOL/L
CREAT SERPL-MCNC: 0.76 MG/DL
EGFR: 100 ML/MIN/1.73M2
EOSINOPHIL # BLD AUTO: 0.15 K/UL
EOSINOPHIL NFR BLD AUTO: 1.7 %
GLUCOSE SERPL-MCNC: 164 MG/DL
HCT VFR BLD CALC: 43.6 %
HGB BLD-MCNC: 13.9 G/DL
IMM GRANULOCYTES NFR BLD AUTO: 0.3 %
INR PPP: 0.99 RATIO
LYMPHOCYTES # BLD AUTO: 1.34 K/UL
LYMPHOCYTES NFR BLD AUTO: 14.8 %
MAN DIFF?: NORMAL
MCHC RBC-ENTMCNC: 27.6 PG
MCHC RBC-ENTMCNC: 31.9 GM/DL
MCV RBC AUTO: 86.5 FL
MONOCYTES # BLD AUTO: 0.46 K/UL
MONOCYTES NFR BLD AUTO: 5.1 %
NEUTROPHILS # BLD AUTO: 7.03 K/UL
NEUTROPHILS NFR BLD AUTO: 77.5 %
PLATELET # BLD AUTO: 264 K/UL
POTASSIUM SERPL-SCNC: 4.6 MMOL/L
PROT SERPL-MCNC: 7.8 G/DL
PT BLD: 11.6 SEC
RBC # BLD: 5.04 M/UL
RBC # FLD: 13.5 %
SODIUM SERPL-SCNC: 144 MMOL/L
WBC # FLD AUTO: 9.06 K/UL

## 2022-07-05 ENCOUNTER — APPOINTMENT (OUTPATIENT)
Dept: INTERNAL MEDICINE | Facility: CLINIC | Age: 67
End: 2022-07-05

## 2022-07-05 PROCEDURE — 99499D: CUSTOM

## 2022-07-12 RX ORDER — FLUTICASONE FUROATE, UMECLIDINIUM BROMIDE AND VILANTEROL TRIFENATATE 100; 62.5; 25 UG/1; UG/1; UG/1
100-62.5-25 POWDER RESPIRATORY (INHALATION)
Qty: 1 | Refills: 3 | Status: ACTIVE | COMMUNITY
Start: 2022-07-12 | End: 1900-01-01

## 2022-07-14 ENCOUNTER — APPOINTMENT (OUTPATIENT)
Dept: PULMONOLOGY | Facility: CLINIC | Age: 67
End: 2022-07-14

## 2022-07-14 VITALS
SYSTOLIC BLOOD PRESSURE: 124 MMHG | DIASTOLIC BLOOD PRESSURE: 74 MMHG | WEIGHT: 285 LBS | TEMPERATURE: 94.1 F | HEART RATE: 117 BPM | BODY MASS INDEX: 40.35 KG/M2 | OXYGEN SATURATION: 95 %

## 2022-07-14 DIAGNOSIS — R06.02 SHORTNESS OF BREATH: ICD-10-CM

## 2022-07-14 PROCEDURE — 99215 OFFICE O/P EST HI 40 MIN: CPT | Mod: 25

## 2022-07-14 PROCEDURE — 99406 BEHAV CHNG SMOKING 3-10 MIN: CPT

## 2022-08-07 NOTE — PHYSICAL EXAM
[No Acute Distress] : no acute distress [Normal Oropharynx] : normal oropharynx [Normal Appearance] : normal appearance [No Neck Mass] : no neck mass [Normal Rate/Rhythm] : normal rate/rhythm [Normal S1, S2] : normal s1, s2 [No Murmurs] : no murmurs [No Resp Distress] : no resp distress [Wheeze] : wheeze [Rhonchi] : rhonchi [No Abnormalities] : no abnormalities [Benign] : benign [Normal Gait] : normal gait [No Clubbing] : no clubbing [No Cyanosis] : no cyanosis [No Edema] : no edema [FROM] : FROM [Normal Color/ Pigmentation] : normal color/ pigmentation [No Focal Deficits] : no focal deficits [Oriented x3] : oriented x3 [Normal Affect] : normal affect [TextBox_68] : Decreased breath sounds bilaterally

## 2022-08-07 NOTE — DISCUSSION/SUMMARY
[FreeTextEntry1] : 65 yo male with hx of SHAYY with related symptoms. Treatment with PAP was again discussed. His DME company will again be contacted. Continued trelegy as before for OAD . Smoking cessation was stressed.A low dose screening chest CT will be performed April 2023 since CTPA performed during hospitalization last April failed to reveal abnormality. He is to follow up with his PMD as before.

## 2022-08-07 NOTE — HISTORY OF PRESENT ILLNESS
[Current] : current [>= 20 pack years] : >= 20 pack years [Awakes Unrefreshed] : awakes unrefreshed [Awakes with Dry Mouth] : awakes with dry mouth [Daytime Somnolence] : daytime somnolence [Fatigue] : fatigue [Snoring] : snoring [TextBox_4] : 67 yo male with hx of SHAYY and OAD ,presents for follow up. The patient has not received his CPAP machine yet, continuing to have sleep related complaints.He complains of PRN SOB without cough or chest pain. He uses trelegy daily with PRN albuterol MDI. He continues to smoke, "only" four cigarettes daily. [TextBox_11] : 1 [TextBox_13] : 30

## 2022-08-07 NOTE — COUNSELING
[Cessation strategies including cessation program discussed] : Cessation strategies including cessation program discussed [Smoking Cessation Program Referral] : Smoking Cessation Program Referral  [Yes] : Willing to quit smoking [FreeTextEntry1] : 10

## 2022-08-15 ENCOUNTER — APPOINTMENT (OUTPATIENT)
Dept: INTERNAL MEDICINE | Facility: CLINIC | Age: 67
End: 2022-08-15

## 2022-08-15 DIAGNOSIS — F11.20 OPIOID DEPENDENCE, UNCOMPLICATED: ICD-10-CM

## 2022-08-15 PROCEDURE — 99499D: CUSTOM

## 2022-08-31 ENCOUNTER — RX RENEWAL (OUTPATIENT)
Age: 67
End: 2022-08-31

## 2022-09-22 ENCOUNTER — APPOINTMENT (OUTPATIENT)
Dept: INTERNAL MEDICINE | Facility: CLINIC | Age: 67
End: 2022-09-22

## 2022-09-22 PROCEDURE — 99499D: CUSTOM

## 2022-09-30 ENCOUNTER — RX RENEWAL (OUTPATIENT)
Age: 67
End: 2022-09-30

## 2022-10-18 ENCOUNTER — APPOINTMENT (OUTPATIENT)
Dept: PULMONOLOGY | Facility: CLINIC | Age: 67
End: 2022-10-18

## 2022-10-18 VITALS
BODY MASS INDEX: 40.49 KG/M2 | TEMPERATURE: 98.2 F | SYSTOLIC BLOOD PRESSURE: 114 MMHG | HEART RATE: 114 BPM | OXYGEN SATURATION: 96 % | WEIGHT: 286 LBS | DIASTOLIC BLOOD PRESSURE: 74 MMHG

## 2022-10-18 DIAGNOSIS — G47.30 SLEEP APNEA, UNSPECIFIED: ICD-10-CM

## 2022-10-18 PROCEDURE — 99406 BEHAV CHNG SMOKING 3-10 MIN: CPT

## 2022-10-18 PROCEDURE — 99214 OFFICE O/P EST MOD 30 MIN: CPT | Mod: 25

## 2022-10-21 ENCOUNTER — APPOINTMENT (OUTPATIENT)
Dept: INTERNAL MEDICINE | Facility: CLINIC | Age: 67
End: 2022-10-21

## 2022-10-21 PROCEDURE — 99499D: CUSTOM

## 2022-10-26 PROBLEM — G47.30 APNEA, SLEEP: Status: ACTIVE | Noted: 2020-10-04

## 2022-10-26 NOTE — DISCUSSION/SUMMARY
[FreeTextEntry1] : 67 yo male with hx of SHAYY, compliant with CPAP use. His DME company will be contacted to change to full face mask. Continued compliance with CPAP use stressed. Smoking cessation was discussed. He is to continue trelegy daily with PRN albuterol MDI. Repeat low dose screening chest CT will be performed. He is to follow up with his PMD as before and for the influenza vaccine.

## 2022-10-26 NOTE — COUNSELING
[Cessation strategies including cessation program discussed] : Cessation strategies including cessation program discussed [Yes] : Willing to quit smoking [FreeTextEntry1] : 10

## 2022-10-26 NOTE — HISTORY OF PRESENT ILLNESS
[Current] : current [>= 20 pack years] : >= 20 pack years [TextBox_4] : 67 yo male with hx of SHAYY and OAD presents for follow up. The patient is compliant with CPAP use but complains of few day history of "increased pressure" from the machine. He denies sleep related complaints. He uses a nasal mask. His breathing is "better" on daily trelegy with PRN albuterol MDI use. He continues to smoke half pack daily. [TextBox_11] : 1 [TextBox_13] : 30 [TextBox_29] : Denies snoring, daytime somnolence, apneic episodes, AM headaches

## 2022-10-26 NOTE — REVIEW OF SYSTEMS
[Diabetes] : diabetes [Negative] : Psychiatric [Fatigue] : no fatigue [Cough] : no cough [Sputum] : no sputum [Dyspnea] : no dyspnea [Wheezing] : no wheezing

## 2022-11-17 ENCOUNTER — APPOINTMENT (OUTPATIENT)
Dept: INTERNAL MEDICINE | Facility: CLINIC | Age: 67
End: 2022-11-17

## 2022-11-17 PROCEDURE — 99499D: CUSTOM

## 2022-12-02 ENCOUNTER — RX RENEWAL (OUTPATIENT)
Age: 67
End: 2022-12-02

## 2022-12-22 ENCOUNTER — APPOINTMENT (OUTPATIENT)
Dept: INTERNAL MEDICINE | Facility: CLINIC | Age: 67
End: 2022-12-22

## 2022-12-22 PROCEDURE — 99499D: CUSTOM

## 2023-01-02 ENCOUNTER — RX RENEWAL (OUTPATIENT)
Age: 68
End: 2023-01-02

## 2023-01-05 ENCOUNTER — RX RENEWAL (OUTPATIENT)
Age: 68
End: 2023-01-05

## 2023-01-12 RX ORDER — ALBUTEROL SULFATE 90 UG/1
108 (90 BASE) INHALANT RESPIRATORY (INHALATION)
Qty: 1 | Refills: 3 | Status: ACTIVE | COMMUNITY
Start: 2022-07-12 | End: 1900-01-01

## 2023-01-24 ENCOUNTER — APPOINTMENT (OUTPATIENT)
Dept: INTERNAL MEDICINE | Facility: CLINIC | Age: 68
End: 2023-01-24
Payer: SELF-PAY

## 2023-01-24 PROCEDURE — 99499D: CUSTOM

## 2023-02-21 ENCOUNTER — APPOINTMENT (OUTPATIENT)
Dept: INTERNAL MEDICINE | Facility: CLINIC | Age: 68
End: 2023-02-21
Payer: SELF-PAY

## 2023-02-21 PROCEDURE — 99499D: CUSTOM

## 2023-03-05 ENCOUNTER — RX RENEWAL (OUTPATIENT)
Age: 68
End: 2023-03-05

## 2023-03-10 ENCOUNTER — NON-APPOINTMENT (OUTPATIENT)
Age: 68
End: 2023-03-10

## 2023-03-21 ENCOUNTER — APPOINTMENT (OUTPATIENT)
Dept: INTERNAL MEDICINE | Facility: CLINIC | Age: 68
End: 2023-03-21
Payer: SELF-PAY

## 2023-03-21 PROCEDURE — 99499D: CUSTOM

## 2023-04-03 ENCOUNTER — RX RENEWAL (OUTPATIENT)
Age: 68
End: 2023-04-03

## 2023-04-04 ENCOUNTER — RX RENEWAL (OUTPATIENT)
Age: 68
End: 2023-04-04

## 2023-04-13 ENCOUNTER — APPOINTMENT (OUTPATIENT)
Dept: PULMONOLOGY | Facility: CLINIC | Age: 68
End: 2023-04-13

## 2023-04-20 ENCOUNTER — APPOINTMENT (OUTPATIENT)
Dept: INTERNAL MEDICINE | Facility: CLINIC | Age: 68
End: 2023-04-20
Payer: SELF-PAY

## 2023-04-20 PROCEDURE — 99499D: CUSTOM

## 2023-04-25 ENCOUNTER — APPOINTMENT (OUTPATIENT)
Dept: PULMONOLOGY | Facility: CLINIC | Age: 68
End: 2023-04-25
Payer: MEDICARE

## 2023-04-25 VITALS
OXYGEN SATURATION: 97 % | BODY MASS INDEX: 40.49 KG/M2 | DIASTOLIC BLOOD PRESSURE: 70 MMHG | WEIGHT: 286 LBS | TEMPERATURE: 97.3 F | HEART RATE: 113 BPM | SYSTOLIC BLOOD PRESSURE: 136 MMHG

## 2023-04-25 DIAGNOSIS — J44.9 CHRONIC OBSTRUCTIVE PULMONARY DISEASE, UNSPECIFIED: ICD-10-CM

## 2023-04-25 DIAGNOSIS — G47.33 OBSTRUCTIVE SLEEP APNEA (ADULT) (PEDIATRIC): ICD-10-CM

## 2023-04-25 PROCEDURE — 99406 BEHAV CHNG SMOKING 3-10 MIN: CPT

## 2023-04-25 PROCEDURE — 99214 OFFICE O/P EST MOD 30 MIN: CPT | Mod: 25

## 2023-04-25 NOTE — DISCUSSION/SUMMARY
[FreeTextEntry1] : 68 yo male with hx of OAD and SHAYY, with related complaints. Compliance with CPAP use was stressed. A full face mask will be ordered. Diet and weight loss stressed. He is to continue use of trelegy daily with PRN albuterol MDI. Smoking cessation was discussed again. A repeat screening chest CT will again be ordered. He is to follow up with his PMD as before.

## 2023-04-25 NOTE — REVIEW OF SYSTEMS
[Fatigue] : fatigue [Diabetes] : diabetes [Negative] : Psychiatric [Cough] : no cough [Sputum] : no sputum [Dyspnea] : no dyspnea [Wheezing] : no wheezing

## 2023-04-25 NOTE — HISTORY OF PRESENT ILLNESS
[Current] : current [Awakes Unrefreshed] : awakes unrefreshed [Daytime Somnolence] : daytime somnolence [Fatigue] : fatigue [Snoring] : snoring [TextBox_4] : 68 yo male with hx of OAD and SHAYY presents for follow up. The patient has trouble with CPAP use because of nasal mask. He has sleep related complaints PRN. He continues to smoke 5-6 cigarettes daily but is "OK" on trelegy daily with PRN albuterol MDI. He did not go for follow up chest CT as recommended.

## 2023-05-22 ENCOUNTER — APPOINTMENT (OUTPATIENT)
Dept: INTERNAL MEDICINE | Facility: CLINIC | Age: 68
End: 2023-05-22
Payer: SELF-PAY

## 2023-05-22 VITALS
DIASTOLIC BLOOD PRESSURE: 80 MMHG | RESPIRATION RATE: 14 BRPM | WEIGHT: 282 LBS | SYSTOLIC BLOOD PRESSURE: 140 MMHG | TEMPERATURE: 99 F | HEART RATE: 108 BPM | HEIGHT: 70 IN | OXYGEN SATURATION: 96 % | BODY MASS INDEX: 40.37 KG/M2

## 2023-05-22 PROCEDURE — 99499D: CUSTOM

## 2023-06-01 ENCOUNTER — RX RENEWAL (OUTPATIENT)
Age: 68
End: 2023-06-01

## 2023-06-02 RX ORDER — FLUTICASONE FUROATE, UMECLIDINIUM BROMIDE AND VILANTEROL TRIFENATATE 100; 62.5; 25 UG/1; UG/1; UG/1
100-62.5-25 POWDER RESPIRATORY (INHALATION)
Qty: 3 | Refills: 3 | Status: ACTIVE | COMMUNITY
Start: 2023-06-02 | End: 1900-01-01

## 2023-06-09 ENCOUNTER — RX RENEWAL (OUTPATIENT)
Age: 68
End: 2023-06-09

## 2023-06-14 NOTE — H&P ADULT - HISTORY OF PRESENT ILLNESS
64M with PMH of CAD s/p CABG (2014), HTN, T2DM on insulin p/w months of tingling CP and TRIPATHI. Pt states has been experiencing L sided tingling CP, non radiating, moderate intensity, worse with exertion and improved with rest, associated with TRIPATHI, palpitations and diaphoresis; no associated nausea/vomiting or lightheadedness, no numbness/tingling. This has been ongoing x months, progressively getting worse over that time. Also endorsing orthopnea, states he feels like he is drowning if he lays flat and has been forced to sleep upright; has chronic cough associated with hx of smoking, not worse than usual. Endorses intermittent b/l dependent LE swelling that improves with elevation. Does endorse he was diagnosed with SHAYY but has not been compliant with CPAP; may have also been diagnosed with emphysema, but pt unclear about this history.   Denies all other ROS. detoxification

## 2023-06-19 ENCOUNTER — APPOINTMENT (OUTPATIENT)
Dept: INTERNAL MEDICINE | Facility: CLINIC | Age: 68
End: 2023-06-19

## 2023-06-20 ENCOUNTER — APPOINTMENT (OUTPATIENT)
Dept: INTERNAL MEDICINE | Facility: CLINIC | Age: 68
End: 2023-06-20
Payer: SELF-PAY

## 2023-06-20 PROCEDURE — 99499D: CUSTOM

## 2023-07-02 ENCOUNTER — RX RENEWAL (OUTPATIENT)
Age: 68
End: 2023-07-02

## 2023-07-07 ENCOUNTER — RX RENEWAL (OUTPATIENT)
Age: 68
End: 2023-07-07

## 2023-07-10 NOTE — ED ADULT NURSE NOTE - PMH
Subjective:      Patient ID: Jose Cruz Gunter is a 4 y.o. male.    Chief Complaint: No chief complaint on file.    HPI  ROS  Objective:     Physical Exam   Assessment:      No diagnosis found.  Plan:                 No follow-ups on file.       CAD (coronary artery disease)    COPD (chronic obstructive pulmonary disease)    Diabetic autonomic neuropathy associated with type 2 diabetes mellitus    DM2 (diabetes mellitus, type 2)    HTN (hypertension)

## 2023-07-11 ENCOUNTER — NON-APPOINTMENT (OUTPATIENT)
Age: 68
End: 2023-07-11

## 2023-07-21 ENCOUNTER — APPOINTMENT (OUTPATIENT)
Dept: INTERNAL MEDICINE | Facility: CLINIC | Age: 68
End: 2023-07-21

## 2023-07-24 ENCOUNTER — APPOINTMENT (OUTPATIENT)
Dept: INTERNAL MEDICINE | Facility: CLINIC | Age: 68
End: 2023-07-24
Payer: SELF-PAY

## 2023-07-24 PROCEDURE — 99499D: CUSTOM

## 2023-08-15 ENCOUNTER — APPOINTMENT (OUTPATIENT)
Dept: PULMONOLOGY | Facility: CLINIC | Age: 68
End: 2023-08-15

## 2023-08-22 ENCOUNTER — APPOINTMENT (OUTPATIENT)
Dept: INTERNAL MEDICINE | Facility: CLINIC | Age: 68
End: 2023-08-22
Payer: SELF-PAY

## 2023-08-22 PROCEDURE — 99499D: CUSTOM

## 2023-08-29 ENCOUNTER — APPOINTMENT (OUTPATIENT)
Dept: PULMONOLOGY | Facility: CLINIC | Age: 68
End: 2023-08-29
Payer: MEDICARE

## 2023-08-29 VITALS
DIASTOLIC BLOOD PRESSURE: 70 MMHG | HEART RATE: 118 BPM | OXYGEN SATURATION: 92 % | WEIGHT: 284 LBS | BODY MASS INDEX: 40.75 KG/M2 | SYSTOLIC BLOOD PRESSURE: 124 MMHG | TEMPERATURE: 97.8 F

## 2023-08-29 PROCEDURE — 99214 OFFICE O/P EST MOD 30 MIN: CPT | Mod: 25

## 2023-08-29 PROCEDURE — 99406 BEHAV CHNG SMOKING 3-10 MIN: CPT

## 2023-09-03 ENCOUNTER — RX RENEWAL (OUTPATIENT)
Age: 68
End: 2023-09-03

## 2023-09-03 RX ORDER — FENOFIBRATE 134 MG/1
134 CAPSULE ORAL
Qty: 90 | Refills: 0 | Status: ACTIVE | COMMUNITY
Start: 2017-12-22 | End: 1900-01-01

## 2023-09-03 NOTE — REVIEW OF SYSTEMS
[Fatigue] : fatigue [Cough] : cough [Sputum] : sputum [Dyspnea] : dyspnea [Wheezing] : no wheezing [Diabetes] : diabetes [Negative] : Psychiatric

## 2023-09-03 NOTE — PHYSICAL EXAM
[No Acute Distress] : no acute distress [Normal Oropharynx] : normal oropharynx [Normal Appearance] : normal appearance [No Neck Mass] : no neck mass [Normal Rate/Rhythm] : normal rate/rhythm [Normal S1, S2] : normal s1, s2 [No Murmurs] : no murmurs [No Resp Distress] : no resp distress [Rhonchi] : rhonchi [No Abnormalities] : no abnormalities [Benign] : benign [Normal Gait] : normal gait [No Clubbing] : no clubbing [No Cyanosis] : no cyanosis [No Edema] : no edema [FROM] : FROM [Normal Color/ Pigmentation] : normal color/ pigmentation [No Focal Deficits] : no focal deficits [Oriented x3] : oriented x3 [Normal Affect] : normal affect [TextBox_68] : Decreased breath sounds bilaterally

## 2023-09-03 NOTE — DISCUSSION/SUMMARY
[FreeTextEntry1] : 67-year-old male with history of OAD and related complaints, continuing to smoke.  Smoking cessation was stressed.  He is to continue Trelegy daily with albuterol as needed.  Follow-up screening chest CT will be performed in the future.  Compliance with CPAP use was stressed.  His DME company will again be contacted.  He is to follow-up with his PMD as before.

## 2023-09-03 NOTE — HISTORY OF PRESENT ILLNESS
[Current] : current [>= 20 pack years] : >= 20 pack years [Awakes Unrefreshed] : awakes unrefreshed [Daytime Somnolence] : daytime somnolence [Fatigue] : fatigue [Snoring] : snoring [TextBox_4] : 67-year-old male with history of SHAYY and OAD presents for follow-up.  Patient complains of occasional dyspnea but his cough has improved.  He continues to smoke nearly 1 pack daily.  He uses Trelegy daily with occasional albuterol metered-dose inhaler.  He has difficulty with his CPAP mask, and still has not received the new mask and parts as ordered last visit.  He has sleep-related complaints. [TextBox_11] : 1 [TextBox_13] : 30

## 2023-09-21 ENCOUNTER — APPOINTMENT (OUTPATIENT)
Dept: INTERNAL MEDICINE | Facility: CLINIC | Age: 68
End: 2023-09-21
Payer: SELF-PAY

## 2023-09-21 PROCEDURE — 99499D: CUSTOM

## 2023-10-16 ENCOUNTER — RX RENEWAL (OUTPATIENT)
Age: 68
End: 2023-10-16

## 2023-10-23 ENCOUNTER — RX RENEWAL (OUTPATIENT)
Age: 68
End: 2023-10-23

## 2023-10-23 ENCOUNTER — APPOINTMENT (OUTPATIENT)
Dept: INTERNAL MEDICINE | Facility: CLINIC | Age: 68
End: 2023-10-23
Payer: SELF-PAY

## 2023-10-23 PROCEDURE — 99499D: CUSTOM

## 2024-01-08 ENCOUNTER — RX RENEWAL (OUTPATIENT)
Age: 69
End: 2024-01-08

## 2024-01-31 NOTE — ED PROVIDER NOTE - CHILD ABUSE FACILITY
Chronic, controlled. Latest blood pressure and vitals reviewed-     Temp:  [98.6 °F (37 °C)]   Pulse:  [62-83]   Resp:  [20-26]   BP: (137-150)/(63-72)   SpO2:  [94 %-98 %] .   Home meds for hypertension were reviewed and noted below.   Hypertension Medications               amLODIPine (NORVASC) 10 MG tablet TAKE 1 TABLET (10 MG TOTAL) BY MOUTH ONCE DAILY.    furosemide (LASIX) 20 MG tablet Take 2 tablets (40 mg total) by mouth once daily.    losartan (COZAAR) 25 MG tablet TAKE 1 TABLET EVERY DAY            While in the hospital, will manage blood pressure as follows; Continue home antihypertensive regimen    Will utilize p.r.n. blood pressure medication only if patient's blood pressure greater than 180/110 and she develops symptoms such as worsening chest pain or shortness of breath.   [facility]

## 2024-03-19 ENCOUNTER — APPOINTMENT (OUTPATIENT)
Dept: INTERNAL MEDICINE | Facility: CLINIC | Age: 69
End: 2024-03-19
Payer: SELF-PAY

## 2024-03-19 PROCEDURE — 99499D: CUSTOM

## 2024-03-26 ENCOUNTER — APPOINTMENT (OUTPATIENT)
Dept: PULMONOLOGY | Facility: CLINIC | Age: 69
End: 2024-03-26
Payer: MEDICAID

## 2024-03-26 VITALS
SYSTOLIC BLOOD PRESSURE: 148 MMHG | BODY MASS INDEX: 41.61 KG/M2 | HEART RATE: 111 BPM | OXYGEN SATURATION: 95 % | TEMPERATURE: 98 F | WEIGHT: 290 LBS | DIASTOLIC BLOOD PRESSURE: 60 MMHG

## 2024-03-26 PROCEDURE — 99214 OFFICE O/P EST MOD 30 MIN: CPT

## 2024-03-26 RX ORDER — ALBUTEROL SULFATE 90 UG/1
108 (90 BASE) INHALANT RESPIRATORY (INHALATION)
Qty: 3 | Refills: 3 | Status: ACTIVE | COMMUNITY
Start: 2024-03-26 | End: 1900-01-01

## 2024-03-26 RX ORDER — FLUTICASONE FUROATE, UMECLIDINIUM BROMIDE AND VILANTEROL TRIFENATATE 100; 62.5; 25 UG/1; UG/1; UG/1
100-62.5-25 POWDER RESPIRATORY (INHALATION)
Qty: 3 | Refills: 3 | Status: ACTIVE | COMMUNITY
Start: 2024-03-26 | End: 1900-01-01

## 2024-03-26 NOTE — HISTORY OF PRESENT ILLNESS
[Current] : current [>= 20 pack years] : >= 20 pack years [Awakes with Dry Mouth] : awakes with dry mouth [Awakes Unrefreshed] : awakes unrefreshed [Fatigue] : fatigue [Daytime Somnolence] : daytime somnolence [Snoring] : snoring [TextBox_4] :   68-year-old male with history of OAD and SHAYY presents for follow-up.  The patient continues to smoke 10 cigarettes daily.  He complains of occasional shortness of breath with rare cough however.  He denies fever, chills, chest pain or hemoptysis.  He continues to use Trelegy daily with albuterol once or twice per day.  The patient has not used CPAP because of nasal mask is discomfort.  He has sleep-related complaints. [TextBox_11] : 1 [TextBox_13] : 31

## 2024-03-26 NOTE — DISCUSSION/SUMMARY
[FreeTextEntry1] : 68-year-old male with history of OAD at baseline.  Smoking cessation was stressed.  He is to continue Trelegy daily with albuterol as needed.  Follow-up screening chest CT will be performed in May of this year.  Compliance with CPAP use was stressed.  His nasal mask will be changed to a full facemask hoping to improve comfort and compliance with treatment.  Diet and weight loss were stressed also.  He is to avoid sedative hypnotic meds as well as excessive alcohol use.  He is to follow-up with his PMD as before.

## 2024-04-04 ENCOUNTER — RX RENEWAL (OUTPATIENT)
Age: 69
End: 2024-04-04

## 2024-04-12 ENCOUNTER — RX RENEWAL (OUTPATIENT)
Age: 69
End: 2024-04-12

## 2024-04-16 NOTE — PATIENT PROFILE ADULT - TRANSPORTATION
Occupational Therapy   Treatment    Name: Luz Maria Alfaro  MRN: [id number]  Admitting Diagnosis:  <principal problem not specified>       Recommendations:     Recommended therapy intensity at discharge: Low Intensity Therapy   Discharge Equipment Recommendations:  walker, rolling  Barriers to discharge:  Decreased caregiver support    Assessment:     Luz Maria Alfaro is a 68 y.o. female with a medical diagnosis of <principal problem not specified>.  She presents with good participation and motivation. Performance deficits affecting function are weakness, impaired balance, impaired endurance, impaired functional mobility.     Rehab Prognosis:  Good; patient would benefit from acute skilled OT services to address these deficits and reach maximum level of function.       Plan:     Patient to be seen 4 x/week to address the above listed problems via self-care/home management, therapeutic activities, therapeutic exercises  Plan of Care Expires: 04/25/24  Plan of Care Reviewed with: patient    Subjective     Pain/Comfort:  Pain Rating 1: 0/10    Objective:     Communicated with: nurse prior to session.  Patient found HOB elevated with telemetry upon OT entry to room.    General Precautions: Standard, aspiration, fall    Orthopedic Precautions:N/A  Braces: N/A  Respiratory Status: Room air     Occupational Performance:     Bed Mobility:    Patient completed Scooting/Bridging with supervision  Patient completed Supine to Sit with supervision  Patient completed Sit to Supine with supervision     Functional Mobility/Transfers:  Patient completed Sit <> Stand Transfer with supervision  with  rolling walker   Patient completed Toilet Transfer Step Transfer technique with supervision with  rolling walker  Patient completed Tub Transfer Step Transfer technique with supervision with rolling walker  Functional Mobility: Mobility to and from bathroom with rolling walker with SBA    Activities of Daily Living:  Grooming: supervision  standing at sink with RW  Bathing: supervision seated, Min A sit to  shower secondary to fatigue  Upper Body Dressing: modified independence   Lower Body Dressing: supervision seated don under garments, pants and socks    AMPAC 6 Click ADL: 21    Patient Education:  Patient provided with verbal education and demonstrations education regarding fall prevention and safety awareness.  Patient was able to return demonstration.      Patient left HOB elevated with all lines intact and call button in reach.    GOALS:   Multidisciplinary Problems       Occupational Therapy Goals          Problem: Occupational Therapy    Goal Priority Disciplines Outcome Interventions   Occupational Therapy Goal     OT, PT/OT Ongoing, Progressing    Description: Goals to be met by 4/25/2024    1. Pt will follow >80% of simple one step commands across three sessions. (Met 4/4/24)  2. Pt will perform grooming EOB with min A. (Met 4/4/24)  3. Pt will perform functional grasp/reach/release of items >80% of trials in prep for increased participation in ADL tasks. (Met 4/4/24)  4. Pt will perform sit<>stand with min A in prep for ADL t/fs. (Met 4/4/24)    Updated Goals:  Pt will be Mod I for toilet transfer with   UE Dressing with [place].  LE Dressing with Ind.  Grooming while standing at sink with Modified [place].  Toileting from toilet with Mod I for hygiene and clothing management.   Toilet transfer to toilet with Mod I.                       Time Tracking:     OT Date of Treatment: 04/16/24  OT Start Time: 1025  OT Stop Time: 1119  OT Total Time (min): 54 min    Billable Minutes:Self Care/Home Management 38  Therapeutic Activity 16    OT/KAREN: KAREN     Number of KAREN visits since last OT visit: 2    4/16/2024       no

## 2024-04-23 ENCOUNTER — APPOINTMENT (OUTPATIENT)
Dept: INTERNAL MEDICINE | Facility: CLINIC | Age: 69
End: 2024-04-23
Payer: SELF-PAY

## 2024-04-23 PROCEDURE — 99499D: CUSTOM

## 2024-05-08 ENCOUNTER — APPOINTMENT (OUTPATIENT)
Dept: INTERNAL MEDICINE | Facility: CLINIC | Age: 69
End: 2024-05-08
Payer: MEDICARE

## 2024-05-08 VITALS
OXYGEN SATURATION: 96 % | DIASTOLIC BLOOD PRESSURE: 73 MMHG | WEIGHT: 282 LBS | BODY MASS INDEX: 40.37 KG/M2 | HEART RATE: 111 BPM | SYSTOLIC BLOOD PRESSURE: 128 MMHG | HEIGHT: 70 IN | TEMPERATURE: 97.3 F

## 2024-05-08 DIAGNOSIS — R77.0 ABNORMALITY OF ALBUMIN: ICD-10-CM

## 2024-05-08 DIAGNOSIS — I25.10 ATHEROSCLEROTIC HEART DISEASE OF NATIVE CORONARY ARTERY W/OUT ANGINA PECTORIS: ICD-10-CM

## 2024-05-08 DIAGNOSIS — E11.9 TYPE 2 DIABETES MELLITUS W/OUT COMPLICATIONS: ICD-10-CM

## 2024-05-08 PROCEDURE — 36415 COLL VENOUS BLD VENIPUNCTURE: CPT

## 2024-05-08 PROCEDURE — G0402 INITIAL PREVENTIVE EXAM: CPT

## 2024-05-08 PROCEDURE — 99213 OFFICE O/P EST LOW 20 MIN: CPT | Mod: 25

## 2024-05-08 RX ORDER — FUROSEMIDE 20 MG/1
20 TABLET ORAL
Qty: 90 | Refills: 0 | Status: DISCONTINUED | COMMUNITY
Start: 2018-04-29 | End: 2024-05-08

## 2024-05-08 RX ORDER — DOCUSATE SODIUM 100 MG/1
100 CAPSULE ORAL 3 TIMES DAILY
Qty: 90 | Refills: 3 | Status: DISCONTINUED | COMMUNITY
Start: 2021-02-04 | End: 2024-05-08

## 2024-05-08 RX ORDER — SENNOSIDES 8.6 MG TABLETS 8.6 MG/1
8.6 TABLET ORAL
Qty: 60 | Refills: 0 | Status: DISCONTINUED | COMMUNITY
Start: 2021-02-04 | End: 2024-05-08

## 2024-05-08 RX ORDER — LISINOPRIL 10 MG/1
10 TABLET ORAL
Qty: 90 | Refills: 3 | Status: ACTIVE | COMMUNITY
Start: 2017-01-26 | End: 1900-01-01

## 2024-05-08 RX ORDER — IBUPROFEN 800 MG/1
800 TABLET, FILM COATED ORAL
Qty: 90 | Refills: 1 | Status: DISCONTINUED | COMMUNITY
Start: 2017-09-28 | End: 2024-05-08

## 2024-05-09 NOTE — ADDENDUM
[FreeTextEntry1] : I, Mercedes Jain, acted as a scribe on behalf of Dr. Ramon Mosqueda MD, on 05/08/2024.   All medical entries made by the scribe were at my, Dr. Ramon Mosqueda MD, direction and personally dictated by me on 05/08/2024. I have reviewed the chart and agree that the record accurately reflects my personal performance of the history, physical exam, assessment and plan. I have also personally directed, reviewed, and agreed with the chart.

## 2024-05-09 NOTE — HISTORY OF PRESENT ILLNESS
[FreeTextEntry1] : Pt presents for a Welcome to Medicare Annual Wellness Visit. [de-identified] : -Recently had testing done with insurance had elevated protein in the urine-has been taking NSAID's however has been slowly cutting down on dose. Does endorse foamy urine, has been attempting to bring sugar down. Denies any chest pain, abdominal pain or hematuria. Has not followed with cardiology in over a year.

## 2024-05-09 NOTE — REASON FOR VISIT
[IPP (Welcome to Medicare)] : an IPP (Welcome to Medicare) visit [FreeTextEntry1] : Welcome to Medicare Annual Wellness Visit

## 2024-05-09 NOTE — ASSESSMENT
[FreeTextEntry1] : Medicare Annual Physical Exam - BP is stable. Continue current management. - Check A1c, lipid panels, vitamin levels, urine analysis.  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 - UTD with vaccines. - UTD with preventive care screenings. - RTO annually or as needed.   Pt verbalized understanding and will reach should any questions/concerns occur.

## 2024-05-09 NOTE — HEALTH RISK ASSESSMENT
[Good] : ~his/her~  mood as  good [No] : No [No falls in past year] : Patient reported no falls in the past year [Fully functional (bathing, dressing, toileting, transferring, walking, feeding)] : Fully functional (bathing, dressing, toileting, transferring, walking, feeding) [Fully functional (using the telephone, shopping, preparing meals, housekeeping, doing laundry, using] : Fully functional and needs no help or supervision to perform IADLs (using the telephone, shopping, preparing meals, housekeeping, doing laundry, using transportation, managing medications and managing finances) [Reports normal functional visual acuity (ie: able to read med bottle)] : Reports normal functional visual acuity [Smoke Detector] : smoke detector [Carbon Monoxide Detector] : carbon monoxide detector [Safety elements used in home] : safety elements used in home [Seat Belt] :  uses seat belt [Sunscreen] : uses sunscreen [Current] : Current [FreeTextEntry1] : health maintenance [de-identified] : PULM, CTSX [Patient declined colonoscopy] : Patient declined colonoscopy [Change in mental status noted] : No change in mental status noted [Language] : denies difficulty with language [Behavior] : denies difficulty with behavior [Learning/Retaining New Information] : denies difficulty learning/retaining new information [Handling Complex Tasks] : denies difficulty handling complex tasks [Reasoning] : denies difficulty with reasoning [Spatial Ability and Orientation] : denies difficulty with spatial ability and orientation [Reports changes in hearing] : Reports no changes in hearing [Reports changes in vision] : Reports no changes in vision [Reports changes in dental health] : Reports no changes in dental health [Guns at Home] : no guns at home [Travel to Developing Areas] : does not  travel to developing areas [TB Exposure] : is not being exposed to tuberculosis [Caregiver Concerns] : does not have caregiver concerns [ColonoscopyComments] : To schedule follow up [de-identified] : marijuana use

## 2024-05-15 ENCOUNTER — NON-APPOINTMENT (OUTPATIENT)
Age: 69
End: 2024-05-15

## 2024-05-15 LAB
25(OH)D3 SERPL-MCNC: 20.1 NG/ML
ALBUMIN SERPL ELPH-MCNC: 4.6 G/DL
ALP BLD-CCNC: 98 U/L
ALT SERPL-CCNC: 20 U/L
ANION GAP SERPL CALC-SCNC: 12 MMOL/L
APPEARANCE: CLEAR
AST SERPL-CCNC: 16 U/L
BASOPHILS # BLD AUTO: 0.07 K/UL
BASOPHILS NFR BLD AUTO: 0.7 %
BILIRUB SERPL-MCNC: 0.2 MG/DL
BILIRUBIN URINE: NEGATIVE
BLOOD URINE: NEGATIVE
BUN SERPL-MCNC: 14 MG/DL
CALCIUM SERPL-MCNC: 10 MG/DL
CHLORIDE SERPL-SCNC: 100 MMOL/L
CHOLEST SERPL-MCNC: 131 MG/DL
CO2 SERPL-SCNC: 28 MMOL/L
COLOR: YELLOW
CREAT SERPL-MCNC: 0.64 MG/DL
CREAT SPEC-SCNC: 109 MG/DL
CREAT/PROT UR: 0.3 RATIO
EGFR: 104 ML/MIN/1.73M2
EOSINOPHIL # BLD AUTO: 0.26 K/UL
EOSINOPHIL NFR BLD AUTO: 2.5 %
ESTIMATED AVERAGE GLUCOSE: 203 MG/DL
GLUCOSE QUALITATIVE U: NEGATIVE MG/DL
GLUCOSE SERPL-MCNC: 126 MG/DL
HBA1C MFR BLD HPLC: 8.7 %
HCT VFR BLD CALC: 44.3 %
HDLC SERPL-MCNC: 45 MG/DL
HGB BLD-MCNC: 13.8 G/DL
IMM GRANULOCYTES NFR BLD AUTO: 0.3 %
KETONES URINE: NEGATIVE MG/DL
LDLC SERPL CALC-MCNC: 67 MG/DL
LEUKOCYTE ESTERASE URINE: NEGATIVE
LYMPHOCYTES # BLD AUTO: 1.57 K/UL
LYMPHOCYTES NFR BLD AUTO: 15.1 %
MAN DIFF?: NORMAL
MCHC RBC-ENTMCNC: 27 PG
MCHC RBC-ENTMCNC: 31.2 GM/DL
MCV RBC AUTO: 86.5 FL
MONOCYTES # BLD AUTO: 0.57 K/UL
MONOCYTES NFR BLD AUTO: 5.5 %
NEUTROPHILS # BLD AUTO: 7.92 K/UL
NEUTROPHILS NFR BLD AUTO: 75.9 %
NITRITE URINE: NEGATIVE
NONHDLC SERPL-MCNC: 86 MG/DL
PH URINE: 6
PLATELET # BLD AUTO: 284 K/UL
POTASSIUM SERPL-SCNC: 4.6 MMOL/L
PROT SERPL-MCNC: 7.5 G/DL
PROT UR-MCNC: 27 MG/DL
PROTEIN URINE: NORMAL MG/DL
PSA SERPL-MCNC: 1.74 NG/ML
RBC # BLD: 5.12 M/UL
RBC # FLD: 13.8 %
SODIUM SERPL-SCNC: 140 MMOL/L
SPECIFIC GRAVITY URINE: 1.02
TRIGL SERPL-MCNC: 103 MG/DL
TSH SERPL-ACNC: 3.62 UIU/ML
UROBILINOGEN URINE: 0.2 MG/DL
VIT B12 SERPL-MCNC: 521 PG/ML
WBC # FLD AUTO: 10.42 K/UL

## 2024-05-21 ENCOUNTER — APPOINTMENT (OUTPATIENT)
Dept: INTERNAL MEDICINE | Facility: CLINIC | Age: 69
End: 2024-05-21
Payer: SELF-PAY

## 2024-05-21 PROCEDURE — 99499D: CUSTOM

## 2024-06-24 ENCOUNTER — APPOINTMENT (OUTPATIENT)
Dept: INTERNAL MEDICINE | Facility: CLINIC | Age: 69
End: 2024-06-24
Payer: SELF-PAY

## 2024-06-24 PROCEDURE — 99499D: CUSTOM

## 2024-06-24 RX ORDER — BUPRENORPHINE AND NALOXONE 8; 2 MG/1; MG/1
8-2 TABLET SUBLINGUAL 3 TIMES DAILY
Qty: 90 | Refills: 0 | Status: ACTIVE | COMMUNITY
Start: 2018-09-27 | End: 1900-01-01

## 2024-07-25 ENCOUNTER — APPOINTMENT (OUTPATIENT)
Dept: INTERNAL MEDICINE | Facility: CLINIC | Age: 69
End: 2024-07-25
Payer: SELF-PAY

## 2024-07-25 PROCEDURE — 99499D: CUSTOM

## 2024-09-09 ENCOUNTER — APPOINTMENT (OUTPATIENT)
Dept: INTERNAL MEDICINE | Facility: CLINIC | Age: 69
End: 2024-09-09
Payer: SELF-PAY

## 2024-09-09 DIAGNOSIS — M54.9 DORSALGIA, UNSPECIFIED: ICD-10-CM

## 2024-09-09 PROCEDURE — 99499D: CUSTOM

## 2024-09-09 RX ORDER — IBUPROFEN 800 MG/1
800 TABLET, FILM COATED ORAL
Qty: 90 | Refills: 1 | Status: ACTIVE | COMMUNITY
Start: 2024-09-09 | End: 1900-01-01

## 2024-09-19 ENCOUNTER — LABORATORY RESULT (OUTPATIENT)
Age: 69
End: 2024-09-19

## 2024-09-19 ENCOUNTER — APPOINTMENT (OUTPATIENT)
Dept: INTERNAL MEDICINE | Facility: CLINIC | Age: 69
End: 2024-09-19
Payer: MEDICARE

## 2024-09-19 VITALS
BODY MASS INDEX: 40.09 KG/M2 | DIASTOLIC BLOOD PRESSURE: 68 MMHG | WEIGHT: 280 LBS | TEMPERATURE: 97.9 F | OXYGEN SATURATION: 94 % | SYSTOLIC BLOOD PRESSURE: 138 MMHG | HEIGHT: 70 IN | HEART RATE: 102 BPM

## 2024-09-19 DIAGNOSIS — K52.9 NONINFECTIVE GASTROENTERITIS AND COLITIS, UNSPECIFIED: ICD-10-CM

## 2024-09-19 DIAGNOSIS — A04.8 OTHER SPECIFIED BACTERIAL INTESTINAL INFECTIONS: ICD-10-CM

## 2024-09-19 PROCEDURE — 36415 COLL VENOUS BLD VENIPUNCTURE: CPT

## 2024-09-19 PROCEDURE — 99214 OFFICE O/P EST MOD 30 MIN: CPT

## 2024-09-23 PROBLEM — A04.8 H. PYLORI INFECTION: Status: ACTIVE | Noted: 2024-09-23

## 2024-09-23 LAB
ALBUMIN SERPL ELPH-MCNC: 4.3 G/DL
ALP BLD-CCNC: 97 U/L
ALT SERPL-CCNC: 23 U/L
ANION GAP SERPL CALC-SCNC: 10 MMOL/L
AST SERPL-CCNC: 20 U/L
BAKER'S YEAST AB QL: 24.1 UNITS
BAKER'S YEAST IGA QL IA: 14.6 UNITS
BAKER'S YEAST IGA QN IA: NEGATIVE
BAKER'S YEAST IGG QN IA: ABNORMAL
BASOPHILS # BLD AUTO: 0.04 K/UL
BASOPHILS NFR BLD AUTO: 0.5 %
BILIRUB SERPL-MCNC: 0.3 MG/DL
BUN SERPL-MCNC: 12 MG/DL
CALCIUM SERPL-MCNC: 9.5 MG/DL
CELIACPAN: NORMAL
CHLORIDE SERPL-SCNC: 103 MMOL/L
CO2 SERPL-SCNC: 28 MMOL/L
CREAT SERPL-MCNC: 0.72 MG/DL
CRP SERPL-MCNC: 23 MG/L
EGFR: 101 ML/MIN/1.73M2
EOSINOPHIL # BLD AUTO: 0.19 K/UL
EOSINOPHIL NFR BLD AUTO: 2.1 %
ERYTHROCYTE [SEDIMENTATION RATE] IN BLOOD BY WESTERGREN METHOD: 28 MM/HR
GLUCOSE SERPL-MCNC: 103 MG/DL
HCT VFR BLD CALC: 44.7 %
HGB BLD-MCNC: 13.8 G/DL
IMM GRANULOCYTES NFR BLD AUTO: 0.3 %
LYMPHOCYTES # BLD AUTO: 1.39 K/UL
LYMPHOCYTES NFR BLD AUTO: 15.7 %
MAN DIFF?: NORMAL
MCHC RBC-ENTMCNC: 26.8 PG
MCHC RBC-ENTMCNC: 30.9 GM/DL
MCV RBC AUTO: 87 FL
MONOCYTES # BLD AUTO: 0.58 K/UL
MONOCYTES NFR BLD AUTO: 6.5 %
NEUTROPHILS # BLD AUTO: 6.65 K/UL
NEUTROPHILS NFR BLD AUTO: 74.9 %
PLATELET # BLD AUTO: 262 K/UL
POTASSIUM SERPL-SCNC: 4.7 MMOL/L
PROT SERPL-MCNC: 7.4 G/DL
RBC # BLD: 5.14 M/UL
RBC # FLD: 14.5 %
SODIUM SERPL-SCNC: 142 MMOL/L
UREA BREATH TEST QL: POSITIVE
WBC # FLD AUTO: 8.88 K/UL

## 2024-09-23 RX ORDER — OMEPRAZOLE 20 MG/1
20 CAPSULE, DELAYED RELEASE ORAL
Qty: 28 | Refills: 0 | Status: ACTIVE | COMMUNITY
Start: 2024-09-23 | End: 2024-10-07

## 2024-09-23 RX ORDER — CLARITHROMYCIN 500 MG/1
500 TABLET, FILM COATED ORAL
Qty: 28 | Refills: 0 | Status: ACTIVE | COMMUNITY
Start: 2024-09-23 | End: 2024-10-07

## 2024-09-23 RX ORDER — AMOXICILLIN 500 MG/1
500 TABLET, FILM COATED ORAL
Qty: 56 | Refills: 0 | Status: ACTIVE | COMMUNITY
Start: 2024-09-23 | End: 2024-10-07

## 2024-09-23 NOTE — PHYSICAL EXAM
[No Acute Distress] : no acute distress [Well Nourished] : well nourished [No Respiratory Distress] : no respiratory distress  [No Accessory Muscle Use] : no accessory muscle use [Clear to Auscultation] : lungs were clear to auscultation bilaterally [Soft] : abdomen soft [Non Tender] : non-tender [Normal Affect] : the affect was normal [Normal Insight/Judgement] : insight and judgment were intact [Normal S1, S2] : normal S1 and S2 [Coordination Grossly Intact] : coordination grossly intact [Normal Gait] : normal gait [Deep Tendon Reflexes (DTR)] : deep tendon reflexes were 2+ and symmetric [de-identified] : elevated HR [de-identified] : distended abdomen.

## 2024-09-23 NOTE — HISTORY OF PRESENT ILLNESS
[FreeTextEntry8] : 68-year-old male here for acute visit with c/o ongoing diarrhea for 1 month.  Reports 7-8 episodes of watery loose stool daily for 1 month. States he has a bowel movement right after eating and has night time awakenings.  States he feels bloated a/w nausea and increased burping.  Notes feeling fatigue. Has been trying to stay hydrated.  Denies blood, or mucus in the stool. Denies recent travels, starting new medications or eating new foods. He works at a restaurant and eats the food often.   Denies vomiting, abdominal pain, constipation or loss of appetite. Denies dizziness or headache.  Denies fever, chills, or night sweats. Has not had a colonoscopy as of yet.   Josettely on Ozempic 2mg managed by his endocrinologist. States 5 weeks ago he increased his dose from 1mg to 2mg.

## 2024-09-23 NOTE — PHYSICAL EXAM
[No Acute Distress] : no acute distress [Well Nourished] : well nourished [No Respiratory Distress] : no respiratory distress  [No Accessory Muscle Use] : no accessory muscle use [Clear to Auscultation] : lungs were clear to auscultation bilaterally [Soft] : abdomen soft [Non Tender] : non-tender [Normal Affect] : the affect was normal [Normal Insight/Judgement] : insight and judgment were intact [Normal S1, S2] : normal S1 and S2 [Coordination Grossly Intact] : coordination grossly intact [Normal Gait] : normal gait [Deep Tendon Reflexes (DTR)] : deep tendon reflexes were 2+ and symmetric [de-identified] : elevated HR [de-identified] : distended abdomen.

## 2024-09-23 NOTE — ASSESSMENT
[FreeTextEntry1] : BP stable.  GI PCR stool panel, C.diff, and ova & parasites ordered to evaluation for infection.  CBC. CMP, ESR, Celiac antibodies ordered.  ASCA IgA/ IgG Antibodies ordered to evaluate for IBD.  H.Pylori breath test ordered. Advised adequate hydration and nutrition as tolerated.  Advised to stop Ozempic given s/s.  Discussed Colonoscopy. GI referral placed.

## 2024-09-23 NOTE — REVIEW OF SYSTEMS
[Fatigue] : fatigue [Nausea] : nausea [Diarrhea] : diarrhea [Negative] : Heme/Lymph [FreeTextEntry7] : bloating

## 2024-09-24 ENCOUNTER — APPOINTMENT (OUTPATIENT)
Dept: PULMONOLOGY | Facility: CLINIC | Age: 69
End: 2024-09-24
Payer: MEDICARE

## 2024-09-24 VITALS
OXYGEN SATURATION: 94 % | TEMPERATURE: 98.1 F | HEART RATE: 113 BPM | DIASTOLIC BLOOD PRESSURE: 74 MMHG | BODY MASS INDEX: 40.18 KG/M2 | SYSTOLIC BLOOD PRESSURE: 116 MMHG | WEIGHT: 280 LBS

## 2024-09-24 PROCEDURE — 99406 BEHAV CHNG SMOKING 3-10 MIN: CPT

## 2024-09-24 PROCEDURE — 99214 OFFICE O/P EST MOD 30 MIN: CPT | Mod: 25

## 2024-09-29 NOTE — DISCUSSION/SUMMARY
[FreeTextEntry1] : 68-year-old male with history of SHAYY with sleep-related complaints.  A new fullface mask will be ordered.  Compliance with treatment was stressed.  He is to continue use of Trelegy and albuterol as before.  Smoking cessation was stressed.  He is to follow-up with his PMD as before.

## 2024-09-29 NOTE — HISTORY OF PRESENT ILLNESS
[Current] : current [>= 20 pack years] : >= 20 pack years [Awakes Unrefreshed] : awakes unrefreshed [Awakes with Dry Mouth] : awakes with dry mouth [Daytime Somnolence] : daytime somnolence [Snoring] : snoring [TextBox_4] : 68-year-old male with history of SHAYY and OAD presents for follow-up.  The patient continues to smoke, down to 6 cigarettes daily.  He complains of occasional productive cough without shortness of breath, chest pain, hemoptysis, night sweats or weight loss.  He uses Trelegy daily with albuterol as needed.  Patient has not received the full facemask as was ordered for use with his CPAP.  He continues to have sleep-related complaints. [TextBox_11] : 1 [TextBox_13] : 32

## 2024-10-01 ENCOUNTER — RX RENEWAL (OUTPATIENT)
Age: 69
End: 2024-10-01

## 2024-10-01 ENCOUNTER — APPOINTMENT (OUTPATIENT)
Dept: GASTROENTEROLOGY | Facility: CLINIC | Age: 69
End: 2024-10-01
Payer: MEDICARE

## 2024-10-01 VITALS
DIASTOLIC BLOOD PRESSURE: 76 MMHG | HEART RATE: 112 BPM | WEIGHT: 281 LBS | BODY MASS INDEX: 40.23 KG/M2 | HEIGHT: 70 IN | SYSTOLIC BLOOD PRESSURE: 144 MMHG | OXYGEN SATURATION: 95 % | RESPIRATION RATE: 16 BRPM

## 2024-10-01 DIAGNOSIS — J45.20 MILD INTERMITTENT ASTHMA, UNCOMPLICATED: ICD-10-CM

## 2024-10-01 DIAGNOSIS — Z86.79 PERSONAL HISTORY OF OTHER DISEASES OF THE CIRCULATORY SYSTEM: ICD-10-CM

## 2024-10-01 DIAGNOSIS — E11.9 TYPE 2 DIABETES MELLITUS W/OUT COMPLICATIONS: ICD-10-CM

## 2024-10-01 DIAGNOSIS — Z86.39 PERSONAL HISTORY OF OTHER ENDOCRINE, NUTRITIONAL AND METABOLIC DISEASE: ICD-10-CM

## 2024-10-01 DIAGNOSIS — Z12.11 ENCOUNTER FOR SCREENING FOR MALIGNANT NEOPLASM OF COLON: ICD-10-CM

## 2024-10-01 DIAGNOSIS — I25.10 ATHEROSCLEROTIC HEART DISEASE OF NATIVE CORONARY ARTERY W/OUT ANGINA PECTORIS: ICD-10-CM

## 2024-10-01 DIAGNOSIS — Z79.4 TYPE 2 DIABETES MELLITUS W/OUT COMPLICATIONS: ICD-10-CM

## 2024-10-01 PROCEDURE — 99204 OFFICE O/P NEW MOD 45 MIN: CPT

## 2024-10-01 RX ORDER — SODIUM PICOSULFATE, MAGNESIUM OXIDE, AND ANHYDROUS CITRIC ACID 12; 3.5; 1 G/175ML; G/175ML; MG/175ML
10-3.5-12 MG-GM LIQUID ORAL TWICE DAILY
Qty: 2 | Refills: 0 | Status: ACTIVE | COMMUNITY
Start: 2024-10-01 | End: 1900-01-01

## 2024-10-01 NOTE — CONSULT LETTER
[Dear  ___] : Dear  [unfilled], [Consult Letter:] : I had the pleasure of evaluating your patient, [unfilled]. [( Thank you for referring [unfilled] for consultation for _____ )] : Thank you for referring [unfilled] for consultation for [unfilled] [Please see my note below.] : Please see my note below. [Consult Closing:] : Thank you very much for allowing me to participate in the care of this patient.  If you have any questions, please do not hesitate to contact me. [Sincerely,] : Sincerely, [FreeTextEntry3] : Celso Mcpherson MD  Gastroenterology [facility]

## 2024-10-01 NOTE — ASSESSMENT
[FreeTextEntry1] : KAYLA LOU was advised to undergo colonoscopy to which he agreed. The procedure will be performed in [facility] with the assistance of an anesthesiologist. The patient was given a Clenpiq preparation prescription and understood the  procedure as it was explained to his. He was given a booklet distributed by the [employer]  [employer] explaining the procedure in detail and he understood the risks of the procedure not limited to infection, bleeding, perforation or non- diagnosis of colorectal cancer. He was advised that he could not drive home, if he chooses to  receive sedation.  Further diagnostic and treatment recommendations will be based upon the procedure and any biopsies, if they are taken.  Thank you for allowing me to participate in this [facility] care.  , Best personal regards -- Don  I spent 46 minutes with the patient and answered all of his question

## 2024-10-01 NOTE — HISTORY OF PRESENT ILLNESS
[FreeTextEntry1] : He is a 68-year-old male referred for a screening colonoscopy.  Last colonoscopy was 20 years ago.  He has recently history of diarrhea for the past month which is now resolved.  He is is feeling well and has no complaints.  He denies a family history of colon cancer

## 2024-10-10 ENCOUNTER — APPOINTMENT (OUTPATIENT)
Dept: INTERNAL MEDICINE | Facility: CLINIC | Age: 69
End: 2024-10-10
Payer: SELF-PAY

## 2024-10-10 PROCEDURE — 99499D: CUSTOM

## 2024-10-17 ENCOUNTER — RX RENEWAL (OUTPATIENT)
Age: 69
End: 2024-10-17

## 2024-11-19 ENCOUNTER — APPOINTMENT (OUTPATIENT)
Dept: INTERNAL MEDICINE | Facility: CLINIC | Age: 69
End: 2024-11-19

## 2024-11-22 ENCOUNTER — APPOINTMENT (OUTPATIENT)
Dept: INTERNAL MEDICINE | Facility: CLINIC | Age: 69
End: 2024-11-22
Payer: SELF-PAY

## 2024-11-22 PROCEDURE — 99499D: CUSTOM

## 2024-11-25 RX ORDER — SODIUM SULFATE, POTASSIUM SULFATE AND MAGNESIUM SULFATE 1.6; 3.13; 17.5 G/177ML; G/177ML; G/177ML
17.5-3.13-1.6 SOLUTION ORAL
Qty: 354 | Refills: 0 | Status: ACTIVE | COMMUNITY
Start: 2024-11-25 | End: 1900-01-01

## 2024-11-26 ENCOUNTER — APPOINTMENT (OUTPATIENT)
Dept: INTERNAL MEDICINE | Facility: CLINIC | Age: 69
End: 2024-11-26

## 2024-11-27 ENCOUNTER — APPOINTMENT (OUTPATIENT)
Dept: GASTROENTEROLOGY | Facility: AMBULATORY SURGERY CENTER | Age: 69
End: 2024-11-27
Payer: MEDICARE

## 2024-11-27 PROCEDURE — 45378 DIAGNOSTIC COLONOSCOPY: CPT

## 2024-12-09 ENCOUNTER — APPOINTMENT (OUTPATIENT)
Dept: INTERNAL MEDICINE | Facility: CLINIC | Age: 69
End: 2024-12-09
Payer: MEDICARE

## 2024-12-09 ENCOUNTER — LABORATORY RESULT (OUTPATIENT)
Age: 69
End: 2024-12-09

## 2024-12-09 VITALS
SYSTOLIC BLOOD PRESSURE: 138 MMHG | BODY MASS INDEX: 39.51 KG/M2 | HEIGHT: 70 IN | TEMPERATURE: 97.3 F | DIASTOLIC BLOOD PRESSURE: 79 MMHG | HEART RATE: 105 BPM | OXYGEN SATURATION: 95 % | WEIGHT: 276 LBS

## 2024-12-09 DIAGNOSIS — I25.10 ATHEROSCLEROTIC HEART DISEASE OF NATIVE CORONARY ARTERY W/OUT ANGINA PECTORIS: ICD-10-CM

## 2024-12-09 DIAGNOSIS — R19.7 DIARRHEA, UNSPECIFIED: ICD-10-CM

## 2024-12-09 DIAGNOSIS — A04.8 OTHER SPECIFIED BACTERIAL INTESTINAL INFECTIONS: ICD-10-CM

## 2024-12-09 DIAGNOSIS — E11.9 TYPE 2 DIABETES MELLITUS W/OUT COMPLICATIONS: ICD-10-CM

## 2024-12-09 PROCEDURE — 99214 OFFICE O/P EST MOD 30 MIN: CPT

## 2024-12-09 PROCEDURE — 36415 COLL VENOUS BLD VENIPUNCTURE: CPT

## 2024-12-09 PROCEDURE — G2211 COMPLEX E/M VISIT ADD ON: CPT

## 2024-12-09 RX ORDER — SEMAGLUTIDE 2.68 MG/ML
8 INJECTION, SOLUTION SUBCUTANEOUS
Qty: 1 | Refills: 2 | Status: ACTIVE | COMMUNITY
Start: 2024-12-09 | End: 1900-01-01

## 2024-12-16 DIAGNOSIS — R79.89 OTHER SPECIFIED ABNORMAL FINDINGS OF BLOOD CHEMISTRY: ICD-10-CM

## 2024-12-16 LAB
25(OH)D3 SERPL-MCNC: 12.7 NG/ML
ALBUMIN SERPL ELPH-MCNC: 4.3 G/DL
ALP BLD-CCNC: 108 U/L
ALT SERPL-CCNC: 18 U/L
ANION GAP SERPL CALC-SCNC: 15 MMOL/L
APPEARANCE: CLEAR
AST SERPL-CCNC: 17 U/L
BASOPHILS # BLD AUTO: 0.07 K/UL
BASOPHILS NFR BLD AUTO: 0.6 %
BILIRUB SERPL-MCNC: 0.3 MG/DL
BILIRUBIN URINE: NEGATIVE
BLOOD URINE: NEGATIVE
BUN SERPL-MCNC: 13 MG/DL
CALCIUM SERPL-MCNC: 9.9 MG/DL
CHLORIDE SERPL-SCNC: 100 MMOL/L
CHOLEST SERPL-MCNC: 155 MG/DL
CO2 SERPL-SCNC: 28 MMOL/L
COLOR: YELLOW
CREAT SERPL-MCNC: 0.83 MG/DL
EGFR: 96 ML/MIN/1.73M2
EOSINOPHIL # BLD AUTO: 0.32 K/UL
EOSINOPHIL NFR BLD AUTO: 2.8 %
ESTIMATED AVERAGE GLUCOSE: 151 MG/DL
GLUCOSE QUALITATIVE U: >=1000 MG/DL
GLUCOSE SERPL-MCNC: 152 MG/DL
HBA1C MFR BLD HPLC: 6.9 %
HCT VFR BLD CALC: 48.5 %
HDLC SERPL-MCNC: 44 MG/DL
HGB BLD-MCNC: 15.4 G/DL
IMM GRANULOCYTES NFR BLD AUTO: 0.6 %
KETONES URINE: NEGATIVE MG/DL
LDLC SERPL CALC-MCNC: 84 MG/DL
LEUKOCYTE ESTERASE URINE: NEGATIVE
LYMPHOCYTES # BLD AUTO: 2.03 K/UL
LYMPHOCYTES NFR BLD AUTO: 17.9 %
MAN DIFF?: NORMAL
MCHC RBC-ENTMCNC: 27.5 PG
MCHC RBC-ENTMCNC: 31.8 G/DL
MCV RBC AUTO: 86.8 FL
MONOCYTES # BLD AUTO: 0.84 K/UL
MONOCYTES NFR BLD AUTO: 7.4 %
NEUTROPHILS # BLD AUTO: 8 K/UL
NEUTROPHILS NFR BLD AUTO: 70.7 %
NITRITE URINE: NEGATIVE
NONHDLC SERPL-MCNC: 110 MG/DL
PH URINE: 6.5
PLATELET # BLD AUTO: 285 K/UL
POTASSIUM SERPL-SCNC: 5 MMOL/L
PROT SERPL-MCNC: 7.5 G/DL
PROTEIN URINE: NORMAL MG/DL
PSA SERPL-MCNC: 1.75 NG/ML
RBC # BLD: 5.59 M/UL
RBC # FLD: 15.6 %
SODIUM SERPL-SCNC: 143 MMOL/L
SPECIFIC GRAVITY URINE: >1.03
TRIGL SERPL-MCNC: 154 MG/DL
TSH SERPL-ACNC: 4.24 UIU/ML
UREA BREATH TEST QL: NEGATIVE
UROBILINOGEN URINE: 0.2 MG/DL
VIT B12 SERPL-MCNC: 550 PG/ML
WBC # FLD AUTO: 11.33 K/UL

## 2024-12-16 RX ORDER — ERGOCALCIFEROL 1.25 MG/1
1.25 MG CAPSULE ORAL
Qty: 12 | Refills: 0 | Status: ACTIVE | COMMUNITY
Start: 2024-12-16 | End: 1900-01-01

## 2024-12-20 ENCOUNTER — APPOINTMENT (OUTPATIENT)
Dept: INTERNAL MEDICINE | Facility: CLINIC | Age: 69
End: 2024-12-20
Payer: SELF-PAY

## 2024-12-20 VITALS
HEART RATE: 107 BPM | RESPIRATION RATE: 16 BRPM | DIASTOLIC BLOOD PRESSURE: 70 MMHG | BODY MASS INDEX: 39.2 KG/M2 | WEIGHT: 280 LBS | OXYGEN SATURATION: 92 % | HEIGHT: 71 IN | SYSTOLIC BLOOD PRESSURE: 116 MMHG | TEMPERATURE: 98.6 F

## 2024-12-20 PROCEDURE — 99499D: CUSTOM

## 2025-01-24 ENCOUNTER — APPOINTMENT (OUTPATIENT)
Dept: INTERNAL MEDICINE | Facility: CLINIC | Age: 70
End: 2025-01-24
Payer: SELF-PAY

## 2025-01-24 PROCEDURE — 99499D: CUSTOM

## 2025-02-27 ENCOUNTER — APPOINTMENT (OUTPATIENT)
Dept: INTERNAL MEDICINE | Facility: CLINIC | Age: 70
End: 2025-02-27
Payer: SELF-PAY

## 2025-02-27 PROCEDURE — 99499D: CUSTOM

## 2025-03-13 NOTE — CONSULT NOTE ADULT - CONSULT REQUESTED BY NAME
Bed: 16main  Expected date:   Expected time:   Means of arrival:   Comments:  [facility]   Dr ESTELA Mccurdy

## 2025-03-18 ENCOUNTER — APPOINTMENT (OUTPATIENT)
Dept: PULMONOLOGY | Facility: CLINIC | Age: 70
End: 2025-03-18

## 2025-04-03 ENCOUNTER — APPOINTMENT (OUTPATIENT)
Dept: INTERNAL MEDICINE | Facility: CLINIC | Age: 70
End: 2025-04-03
Payer: SELF-PAY

## 2025-04-03 PROCEDURE — 99499D: CUSTOM

## 2025-04-04 ENCOUNTER — APPOINTMENT (OUTPATIENT)
Dept: INTERNAL MEDICINE | Facility: CLINIC | Age: 70
End: 2025-04-04

## 2025-04-09 ENCOUNTER — APPOINTMENT (OUTPATIENT)
Dept: INTERNAL MEDICINE | Facility: CLINIC | Age: 70
End: 2025-04-09
Payer: MEDICARE

## 2025-04-09 VITALS
WEIGHT: 270 LBS | BODY MASS INDEX: 38.65 KG/M2 | SYSTOLIC BLOOD PRESSURE: 111 MMHG | OXYGEN SATURATION: 93 % | TEMPERATURE: 97 F | HEART RATE: 105 BPM | DIASTOLIC BLOOD PRESSURE: 66 MMHG | HEIGHT: 70 IN

## 2025-04-09 DIAGNOSIS — R19.7 DIARRHEA, UNSPECIFIED: ICD-10-CM

## 2025-04-09 DIAGNOSIS — K21.9 GASTRO-ESOPHAGEAL REFLUX DISEASE W/OUT ESOPHAGITIS: ICD-10-CM

## 2025-04-09 PROCEDURE — 99214 OFFICE O/P EST MOD 30 MIN: CPT

## 2025-04-09 PROCEDURE — G2211 COMPLEX E/M VISIT ADD ON: CPT

## 2025-04-09 RX ORDER — PANTOPRAZOLE 40 MG/1
40 TABLET, DELAYED RELEASE ORAL
Qty: 90 | Refills: 3 | Status: ACTIVE | COMMUNITY
Start: 2025-04-09 | End: 1900-01-01

## 2025-04-15 DIAGNOSIS — R05.9 COUGH, UNSPECIFIED: ICD-10-CM

## 2025-04-15 RX ORDER — PREDNISONE 20 MG/1
20 TABLET ORAL
Qty: 10 | Refills: 0 | Status: ACTIVE | COMMUNITY
Start: 2025-04-15 | End: 1900-01-01

## 2025-04-29 ENCOUNTER — APPOINTMENT (OUTPATIENT)
Dept: ENDOCRINOLOGY | Facility: CLINIC | Age: 70
End: 2025-04-29
Payer: MEDICARE

## 2025-04-29 ENCOUNTER — APPOINTMENT (OUTPATIENT)
Dept: INTERNAL MEDICINE | Facility: CLINIC | Age: 70
End: 2025-04-29
Payer: SELF-PAY

## 2025-04-29 VITALS
RESPIRATION RATE: 22 BRPM | WEIGHT: 269 LBS | OXYGEN SATURATION: 93 % | DIASTOLIC BLOOD PRESSURE: 86 MMHG | HEART RATE: 115 BPM | HEIGHT: 70 IN | SYSTOLIC BLOOD PRESSURE: 142 MMHG | BODY MASS INDEX: 38.51 KG/M2

## 2025-04-29 DIAGNOSIS — E11.9 TYPE 2 DIABETES MELLITUS W/OUT COMPLICATIONS: ICD-10-CM

## 2025-04-29 DIAGNOSIS — H35.30 UNSPECIFIED MACULAR DEGENERATION: ICD-10-CM

## 2025-04-29 DIAGNOSIS — E66.9 OBESITY, UNSPECIFIED: ICD-10-CM

## 2025-04-29 PROCEDURE — 99499D: CUSTOM

## 2025-04-29 PROCEDURE — 36415 COLL VENOUS BLD VENIPUNCTURE: CPT

## 2025-04-29 PROCEDURE — 99204 OFFICE O/P NEW MOD 45 MIN: CPT

## 2025-05-01 ENCOUNTER — EMERGENCY (EMERGENCY)
Facility: HOSPITAL | Age: 70
LOS: 1 days | End: 2025-05-01
Payer: MEDICARE

## 2025-05-01 VITALS
RESPIRATION RATE: 18 BRPM | SYSTOLIC BLOOD PRESSURE: 151 MMHG | HEIGHT: 71 IN | HEART RATE: 131 BPM | TEMPERATURE: 98 F | OXYGEN SATURATION: 97 % | WEIGHT: 175.05 LBS | DIASTOLIC BLOOD PRESSURE: 81 MMHG

## 2025-05-01 VITALS
RESPIRATION RATE: 18 BRPM | TEMPERATURE: 98 F | OXYGEN SATURATION: 94 % | DIASTOLIC BLOOD PRESSURE: 94 MMHG | HEART RATE: 96 BPM | SYSTOLIC BLOOD PRESSURE: 166 MMHG

## 2025-05-01 DIAGNOSIS — Z98.89 OTHER SPECIFIED POSTPROCEDURAL STATES: Chronic | ICD-10-CM

## 2025-05-01 DIAGNOSIS — Z95.1 PRESENCE OF AORTOCORONARY BYPASS GRAFT: Chronic | ICD-10-CM

## 2025-05-01 PROCEDURE — 73630 X-RAY EXAM OF FOOT: CPT

## 2025-05-01 PROCEDURE — 99283 EMERGENCY DEPT VISIT LOW MDM: CPT

## 2025-05-01 PROCEDURE — 73630 X-RAY EXAM OF FOOT: CPT | Mod: 26,RT

## 2025-05-01 PROCEDURE — 99284 EMERGENCY DEPT VISIT MOD MDM: CPT | Mod: 25

## 2025-05-01 RX ORDER — ACETAMINOPHEN 500 MG/5ML
975 LIQUID (ML) ORAL ONCE
Refills: 0 | Status: COMPLETED | OUTPATIENT
Start: 2025-05-01 | End: 2025-05-01

## 2025-05-01 RX ADMIN — Medication 975 MILLIGRAM(S): at 20:28

## 2025-05-01 NOTE — ED PROVIDER NOTE - CLINICAL SUMMARY MEDICAL DECISION MAKING FREE TEXT BOX
Patient is a 70 y/o M PMHx IDDM2, CAD s/p triple cardiac bypass, HTN, HLD, COPD presents c/o R foot wound x 2 days. Notes he dropped a plate on his R foot 2 nights ago and now has pain on the dorsum of his foot and some tingling in the 2nd and 3rd digits. Has been ambulatory since.  VS non actionable  DDx/plan-  R foot wound iso dropping  a plate on it. No indication for sutures/closure given happened 48 hours ago with no active bleeding. Will obtain xray to eval for foot fracture. Patient is a 68 y/o M PMHx IDDM2, CAD s/p triple cardiac bypass, HTN, HLD, COPD presents c/o R foot wound x 2 days. Notes he dropped a plate on his R foot 2 nights ago and now has pain on the dorsum of his foot and some tingling in the 2nd and 3rd digits. Has been ambulatory since.  VS non actionable  DDx/plan-  R foot wound iso dropping  a plate on it. No indication for sutures/closure given happened 48 hours ago with no active bleeding. Will obtain xray to eval for foot fracture. reassess ZR

## 2025-05-01 NOTE — ED ADULT NURSE NOTE - OBJECTIVE STATEMENT
Pt is a 68 y/o M came from home c/o wound on the top of rt foot. Pt states dropped a glass plate on foot x 2 days ago. Pt endorsing numbness in the rt foot. Pt w/PMH DM2, CAD, HTN, CABG x 2 (2014).Of note, Pt A&Ox4, has a patent airway, spontaneously breathing > 95% on RA, no active bleeding and edema, full ROM, ambulatory to rt foot , skin warm, dry, and intact. Pt denies Cp, SOB, N/V/D, fevers, chills. Pt denies any pain or discomfort at this time. Safety and comfort measures in place, side rails up. Pt is a 68 y/o M came from home c/o wound on the top of rt foot. Pt states dropped a glass plate on foot x 2 days ago. Pt endorsing numbness in the rt foot. Pt w/PMH DM2, CAD, HTN, CABG x 2 (2014). Of note, Pt A&Ox4, has a patent airway, spontaneously breathing > 95% on RA, no active bleeding/drainage noted to wound, no edema noted to B/L lower extremities, pedal pulses +2 B/L, full ROM across 4 extremities, ambulatory without difficulty, skin warm, dry, and intact. Pt denies CP, SOB, N/V/D, fevers, chills. Pt denies any pain or discomfort at this time. Safety and comfort measures in place, side rails up.

## 2025-05-01 NOTE — ED PROVIDER NOTE - OBJECTIVE STATEMENT
CT Prior Bella Meng # [id number]    Faxed to [facility] as requested. Pt advised. He will call Monday to get himself scheduled.
Ct abd pelv with IV contrast at [facility] please. Ordered. Please advise patient of next steps and get him scheduled.
Okay please set that up.
Patient notified.
The patient called back and said that he wants to get the ct done at [facility] since [id number] [facility] told him 2 weeks and $1200 where [facility] is around $195.   [facility] told him they would need this prior authed and to send the order with the prior auth number
The patient called in and said that he is still experiencing the abdominal pain and would like to take the next steps to see what is going on.
Patient is a 68 y/o M PMHx IDDM2, CAD s/p triple cardiac bypass, HTN, HLD, COPD presents c/o R foot wound x 2 days. Notes he dropped a plate on his R foot 2 nights ago and now has pain on the dorsum of his foot and some tingling in the 2nd and 3rd digits. Has been ambulatory since. Patient denies any fevers, chills, nausea, vomiting, chest pain, diarrhea, constipation, painful urination, new rashes.

## 2025-05-01 NOTE — ED PROVIDER NOTE - PHYSICAL EXAMINATION
General: well appearing, interactive, well nourished, no apparent distress, ncat  MSK:+R foot with 2cm superficial laceration well healing scabbed over no active bleeding, mild TTP no surrounding erythema.  full range of motion, no cyanosis, no edema, no clubbing, no immobility  Neuro: muscle strength 5/5 in all extremities, normal gait  Skin: See MSK, otherwise no rashes, skin intact

## 2025-05-01 NOTE — ED ADULT NURSE NOTE - MODE OF DISCHARGE
[facility] Refill Guideline reviewed.  Medication meets criteria for refill.      Ambulatory

## 2025-05-01 NOTE — ED PROVIDER NOTE - PATIENT PORTAL LINK FT
You can access the FollowMyHealth Patient Portal offered by [facility] by registering at the following website: [email or web address]. By joining [facility]’s FollowMyHealth portal, you will also be able to view your health information using other applications (apps) compatible with our system.

## 2025-05-01 NOTE — ED ADULT NURSE NOTE - NSFALLUNIVINTERV_ED_ALL_ED
Bed/Stretcher in lowest position, wheels locked, appropriate side rails in place/Call bell, personal items and telephone in reach/Instruct patient to call for assistance before getting out of bed/chair/stretcher/Non-slip footwear applied when patient is off stretcher/[place] to call system/Physically safe environment - no spills, clutter or unnecessary equipment/Purposeful proactive rounding/Room/bathroom lighting operational, light cord in reach

## 2025-05-01 NOTE — ED PROVIDER NOTE - NSFOLLOWUPINSTRUCTIONS_ED_ALL_ED_FT
You were seen in the emergency department for a foot injury. We have evaluated you and determined that you do not require further hospital interventions.    During your stay you had the following relevant results:   You have no fracture in the foot.   For pain please take Tylenol- 1000mg every 6 hours. Do not take more than 4,000mg in a day.   If still having pain add Ibuprofen 400mg every 6 hours. Do not take more than 1200mg in a day.   Please follow up with your primary care provider and a podiatrist as necessary to discuss the results of your stay in our department.  For pain please take   If you start to experience worsening symptoms such as worsening pain, fevers, swelling, discharge, inability to walk, please return to the emergency department for further evaluation.

## 2025-05-01 NOTE — ED PROVIDER NOTE - PROGRESS NOTE DETAILS
Polo Petersen, DO (PGY-2) Xrays negative for fx. Continues to ambulate without issue. patient will follow up with a podiatrist. Return precautions and care instructions discussed with patient at bedside. Patient endorsed understanding via teachback method.

## 2025-05-01 NOTE — ED ADULT NURSE NOTE - NS ED NURSE LEVEL OF CONSCIOUSNESS ORIENTATION
Continue to monitor diet, and maintain physical activity  Continue with metformin 500 mg 2 tablets twice a day, Jardiance 10 mg daily, Lantus 18 units daily  Get lab work completed  If A1C is high, we will start on glimepiride  To make sure to check blood sugar 2 or more times a day  Please send in blood sugar logs in 2 weeks so we can make adjustments to medication if needed  Continue with cholesterol and blood pressure medication  Follow-up in 3 months with lab work prior to visit 
Oriented - self; Oriented - place; Oriented - time

## 2025-05-02 LAB
25(OH)D3 SERPL-MCNC: 24.3 NG/ML
ALBUMIN SERPL ELPH-MCNC: 4.5 G/DL
ALP BLD-CCNC: 106 U/L
ALT SERPL-CCNC: 21 U/L
ANION GAP SERPL CALC-SCNC: 21 MMOL/L
AST SERPL-CCNC: 26 U/L
BASOPHILS # BLD AUTO: 0.06 K/UL
BASOPHILS NFR BLD AUTO: 0.5 %
BILIRUB SERPL-MCNC: 0.4 MG/DL
BUN SERPL-MCNC: 15 MG/DL
CALCIUM SERPL-MCNC: 9.6 MG/DL
CHLORIDE SERPL-SCNC: 100 MMOL/L
CHOLEST SERPL-MCNC: 141 MG/DL
CO2 SERPL-SCNC: 21 MMOL/L
CREAT SERPL-MCNC: 0.72 MG/DL
CREAT SPEC-SCNC: 97 MG/DL
EGFRCR SERPLBLD CKD-EPI 2021: 100 ML/MIN/1.73M2
EOSINOPHIL # BLD AUTO: 0.21 K/UL
EOSINOPHIL NFR BLD AUTO: 1.8 %
ESTIMATED AVERAGE GLUCOSE: 157 MG/DL
GLUCOSE SERPL-MCNC: 200 MG/DL
HBA1C MFR BLD HPLC: 7.1 %
HCT VFR BLD CALC: 46.2 %
HDLC SERPL-MCNC: 46 MG/DL
HGB BLD-MCNC: 14.4 G/DL
IMM GRANULOCYTES NFR BLD AUTO: 0.3 %
LDLC SERPL-MCNC: 73 MG/DL
LYMPHOCYTES # BLD AUTO: 2.03 K/UL
LYMPHOCYTES NFR BLD AUTO: 17.6 %
MAN DIFF?: NORMAL
MCHC RBC-ENTMCNC: 26.8 PG
MCHC RBC-ENTMCNC: 31.2 G/DL
MCV RBC AUTO: 86 FL
MICROALBUMIN 24H UR DL<=1MG/L-MCNC: 4.4 MG/DL
MICROALBUMIN/CREAT 24H UR-RTO: 45 MG/G
MONOCYTES # BLD AUTO: 0.71 K/UL
MONOCYTES NFR BLD AUTO: 6.2 %
NEUTROPHILS # BLD AUTO: 8.49 K/UL
NEUTROPHILS NFR BLD AUTO: 73.6 %
NONHDLC SERPL-MCNC: 96 MG/DL
PLATELET # BLD AUTO: 299 K/UL
POTASSIUM SERPL-SCNC: 5 MMOL/L
PROT SERPL-MCNC: 7.6 G/DL
RBC # BLD: 5.37 M/UL
RBC # FLD: 15 %
SODIUM SERPL-SCNC: 142 MMOL/L
T4 FREE SERPL-MCNC: 1.4 NG/DL
THYROGLOB AB SERPL-ACNC: 20.3 IU/ML
THYROPEROXIDASE AB SERPL IA-ACNC: 12 IU/ML
TRIGL SERPL-MCNC: 125 MG/DL
TSH SERPL-ACNC: 4.21 UIU/ML
VIT B12 SERPL-MCNC: 502 PG/ML
WBC # FLD AUTO: 11.54 K/UL

## 2025-05-07 PROBLEM — E66.9 OBESITY (BMI 30-39.9): Status: ACTIVE | Noted: 2025-04-29

## 2025-05-09 RX ORDER — TIRZEPATIDE 2.5 MG/.5ML
2.5 INJECTION, SOLUTION SUBCUTANEOUS
Qty: 1 | Refills: 0 | Status: ACTIVE | COMMUNITY
Start: 2025-04-29 | End: 1900-01-01

## 2025-05-12 RX ORDER — BLOOD-GLUCOSE METER
W/DEVICE KIT MISCELLANEOUS
Qty: 1 | Refills: 0 | Status: ACTIVE | COMMUNITY
Start: 2025-05-12 | End: 1900-01-01

## 2025-05-19 ENCOUNTER — RX RENEWAL (OUTPATIENT)
Age: 70
End: 2025-05-19

## 2025-05-30 ENCOUNTER — APPOINTMENT (OUTPATIENT)
Dept: OPHTHALMOLOGY | Facility: CLINIC | Age: 70
End: 2025-05-30
Payer: MEDICARE

## 2025-05-30 ENCOUNTER — NON-APPOINTMENT (OUTPATIENT)
Age: 70
End: 2025-05-30

## 2025-05-30 PROCEDURE — 92020 GONIOSCOPY: CPT

## 2025-05-30 PROCEDURE — 92133 CPTRZD OPH DX IMG PST SGM ON: CPT

## 2025-05-30 PROCEDURE — 92004 COMPRE OPH EXAM NEW PT 1/>: CPT

## 2025-06-03 ENCOUNTER — APPOINTMENT (OUTPATIENT)
Dept: INTERNAL MEDICINE | Facility: CLINIC | Age: 70
End: 2025-06-03
Payer: SELF-PAY

## 2025-06-03 PROCEDURE — 99499D: CUSTOM

## 2025-06-05 ENCOUNTER — NON-APPOINTMENT (OUTPATIENT)
Age: 70
End: 2025-06-05

## 2025-06-05 ENCOUNTER — APPOINTMENT (OUTPATIENT)
Age: 70
End: 2025-06-05
Payer: MEDICARE

## 2025-06-05 PROCEDURE — 92012 INTRM OPH EXAM EST PATIENT: CPT

## 2025-06-05 PROCEDURE — 76514 ECHO EXAM OF EYE THICKNESS: CPT

## 2025-06-05 PROCEDURE — 92020 GONIOSCOPY: CPT

## 2025-06-10 ENCOUNTER — APPOINTMENT (OUTPATIENT)
Dept: PULMONOLOGY | Facility: CLINIC | Age: 70
End: 2025-06-10
Payer: MEDICARE

## 2025-06-10 VITALS
HEART RATE: 116 BPM | OXYGEN SATURATION: 96 % | TEMPERATURE: 98 F | HEIGHT: 70 IN | SYSTOLIC BLOOD PRESSURE: 110 MMHG | DIASTOLIC BLOOD PRESSURE: 60 MMHG | BODY MASS INDEX: 38.51 KG/M2 | WEIGHT: 269 LBS

## 2025-06-10 PROCEDURE — 99214 OFFICE O/P EST MOD 30 MIN: CPT

## 2025-06-10 RX ORDER — FLUTICASONE FUROATE, UMECLIDINIUM BROMIDE AND VILANTEROL TRIFENATATE 100; 62.5; 25 UG/1; UG/1; UG/1
100-62.5-25 POWDER RESPIRATORY (INHALATION)
Qty: 1 | Refills: 3 | Status: ACTIVE | COMMUNITY
Start: 2025-06-10 | End: 1900-01-01

## 2025-06-16 NOTE — H&P ADULT - PROBLEM/PLAN-5
Returned patients call, he stated the bag isnt being held to his leg correctly and asking if office has better bags. Nurse visit scheduled for patients bag to be assessed and possibly changed.   
When scheduling apt with pt - pt stated since being in the ER he has a catheter in.  Pt did state he doesn't like it and wondering if you had any different ones available.  This catheter is on the bottom of his leg and there is no velcro.  He's looking for a call back.  
DISPLAY PLAN FREE TEXT

## 2025-06-20 NOTE — ED PROVIDER NOTE - NS ED ATTENDING STATEMENT MOD
CALLED PT TO SCHEDULE APPT FOR PATHOLOGY RESULTS    PLEASE TRANSFER TO THE OFFICE   I have personally seen and examined this patient.  I have fully participated in the care of this patient. I have reviewed all pertinent clinical information, including history, physical exam, plan and the Resident’s note and agree except as noted.

## 2025-07-01 ENCOUNTER — APPOINTMENT (OUTPATIENT)
Dept: INTERNAL MEDICINE | Facility: CLINIC | Age: 70
End: 2025-07-01
Payer: SELF-PAY

## 2025-07-01 PROCEDURE — 99499D: CUSTOM

## 2025-08-04 ENCOUNTER — APPOINTMENT (OUTPATIENT)
Dept: INTERNAL MEDICINE | Facility: CLINIC | Age: 70
End: 2025-08-04
Payer: SELF-PAY

## 2025-08-04 PROCEDURE — 99499D: CUSTOM

## 2025-08-07 ENCOUNTER — APPOINTMENT (OUTPATIENT)
Age: 70
End: 2025-08-07
Payer: MEDICARE

## 2025-08-07 ENCOUNTER — NON-APPOINTMENT (OUTPATIENT)
Age: 70
End: 2025-08-07

## 2025-08-07 PROCEDURE — 92133 CPTRZD OPH DX IMG PST SGM ON: CPT

## 2025-08-07 PROCEDURE — 92012 INTRM OPH EXAM EST PATIENT: CPT

## 2025-08-07 PROCEDURE — 76514 ECHO EXAM OF EYE THICKNESS: CPT

## 2025-08-11 ENCOUNTER — RX RENEWAL (OUTPATIENT)
Age: 70
End: 2025-08-11

## 2025-08-12 ENCOUNTER — APPOINTMENT (OUTPATIENT)
Dept: OPHTHALMOLOGY | Facility: CLINIC | Age: 70
End: 2025-08-12
Payer: MEDICARE

## 2025-08-12 ENCOUNTER — NON-APPOINTMENT (OUTPATIENT)
Age: 70
End: 2025-08-12

## 2025-08-12 PROCEDURE — 92012 INTRM OPH EXAM EST PATIENT: CPT

## 2025-08-13 ENCOUNTER — RX RENEWAL (OUTPATIENT)
Age: 70
End: 2025-08-13

## 2025-08-13 ENCOUNTER — APPOINTMENT (OUTPATIENT)
Dept: ENDOCRINOLOGY | Facility: CLINIC | Age: 70
End: 2025-08-13

## 2025-08-21 ENCOUNTER — APPOINTMENT (OUTPATIENT)
Age: 70
End: 2025-08-21
Payer: MEDICARE

## 2025-08-21 ENCOUNTER — NON-APPOINTMENT (OUTPATIENT)
Age: 70
End: 2025-08-21

## 2025-08-21 PROCEDURE — 92136 OPHTHALMIC BIOMETRY: CPT | Mod: RT

## 2025-08-21 PROCEDURE — 92134 CPTRZ OPH DX IMG PST SGM RTA: CPT

## 2025-08-21 PROCEDURE — 92012 INTRM OPH EXAM EST PATIENT: CPT

## 2025-09-05 ENCOUNTER — APPOINTMENT (OUTPATIENT)
Dept: INTERNAL MEDICINE | Facility: CLINIC | Age: 70
End: 2025-09-05
Payer: SELF-PAY

## 2025-09-05 PROCEDURE — 99499D: CUSTOM

## 2025-09-09 ENCOUNTER — APPOINTMENT (OUTPATIENT)
Dept: INTERNAL MEDICINE | Facility: CLINIC | Age: 70
End: 2025-09-09
Payer: MEDICARE

## 2025-09-09 VITALS
DIASTOLIC BLOOD PRESSURE: 83 MMHG | HEART RATE: 114 BPM | WEIGHT: 267 LBS | TEMPERATURE: 97.6 F | BODY MASS INDEX: 38.31 KG/M2 | OXYGEN SATURATION: 95 % | SYSTOLIC BLOOD PRESSURE: 134 MMHG

## 2025-09-09 DIAGNOSIS — Z01.818 ENCOUNTER FOR OTHER PREPROCEDURAL EXAMINATION: ICD-10-CM

## 2025-09-09 PROCEDURE — 99213 OFFICE O/P EST LOW 20 MIN: CPT

## 2025-09-09 PROCEDURE — 36415 COLL VENOUS BLD VENIPUNCTURE: CPT

## 2025-09-09 PROCEDURE — 93000 ELECTROCARDIOGRAM COMPLETE: CPT

## 2025-09-11 ENCOUNTER — APPOINTMENT (OUTPATIENT)
Dept: ENDOCRINOLOGY | Facility: CLINIC | Age: 70
End: 2025-09-11

## 2025-09-11 VITALS
HEIGHT: 70 IN | TEMPERATURE: 97.3 F | DIASTOLIC BLOOD PRESSURE: 68 MMHG | WEIGHT: 267 LBS | SYSTOLIC BLOOD PRESSURE: 110 MMHG | HEART RATE: 103 BPM | BODY MASS INDEX: 38.22 KG/M2 | OXYGEN SATURATION: 94 %

## 2025-09-12 LAB
25(OH)D3 SERPL-MCNC: 21.8 NG/ML
ALBUMIN SERPL ELPH-MCNC: 4.6 G/DL
ALBUMIN, RANDOM URINE: 1.6 MG/DL
ALP BLD-CCNC: 121 U/L
ALT SERPL-CCNC: 26 U/L
ANION GAP SERPL CALC-SCNC: 15 MMOL/L
AST SERPL-CCNC: 24 U/L
BASOPHILS # BLD AUTO: 0.06 K/UL
BASOPHILS NFR BLD AUTO: 0.6 %
BILIRUB SERPL-MCNC: 0.2 MG/DL
BUN SERPL-MCNC: 12 MG/DL
CALCIUM SERPL-MCNC: 10 MG/DL
CHLORIDE SERPL-SCNC: 106 MMOL/L
CHOLEST SERPL-MCNC: 158 MG/DL
CO2 SERPL-SCNC: 23 MMOL/L
CREAT SERPL-MCNC: 0.79 MG/DL
CREAT SPEC-SCNC: 134 MG/DL
EGFRCR SERPLBLD CKD-EPI 2021: 97 ML/MIN/1.73M2
EOSINOPHIL # BLD AUTO: 0.29 K/UL
EOSINOPHIL NFR BLD AUTO: 2.7 %
ESTIMATED AVERAGE GLUCOSE: 143 MG/DL
GLUCOSE BLDC GLUCOMTR-MCNC: 92
GLUCOSE SERPL-MCNC: 67 MG/DL
HBA1C MFR BLD HPLC: 6.6 %
HCT VFR BLD CALC: 45 %
HDLC SERPL-MCNC: 44 MG/DL
HGB BLD-MCNC: 14.6 G/DL
IMM GRANULOCYTES NFR BLD AUTO: 0.4 %
LDLC SERPL-MCNC: 82 MG/DL
LYMPHOCYTES # BLD AUTO: 2.01 K/UL
LYMPHOCYTES NFR BLD AUTO: 18.6 %
MAN DIFF?: NORMAL
MCHC RBC-ENTMCNC: 27.4 PG
MCHC RBC-ENTMCNC: 32.4 G/DL
MCV RBC AUTO: 84.6 FL
MICROALBUMIN/CREAT 24H UR-RTO: 12 MG/G
MONOCYTES # BLD AUTO: 0.68 K/UL
MONOCYTES NFR BLD AUTO: 6.3 %
NEUTROPHILS # BLD AUTO: 7.72 K/UL
NEUTROPHILS NFR BLD AUTO: 71.4 %
NONHDLC SERPL-MCNC: 114 MG/DL
PLATELET # BLD AUTO: 309 K/UL
POTASSIUM SERPL-SCNC: 4.5 MMOL/L
PROT SERPL-MCNC: 7.8 G/DL
RBC # BLD: 5.32 M/UL
RBC # FLD: 15.7 %
SODIUM SERPL-SCNC: 144 MMOL/L
T4 FREE SERPL-MCNC: 1.2 NG/DL
TRIGL SERPL-MCNC: 191 MG/DL
TSH SERPL-ACNC: 4.44 UIU/ML
VIT B12 SERPL-MCNC: 436 PG/ML
WBC # FLD AUTO: 10.8 K/UL

## 2025-09-15 ENCOUNTER — APPOINTMENT (OUTPATIENT)
Dept: OPHTHALMOLOGY | Facility: AMBULATORY SURGERY CENTER | Age: 70
End: 2025-09-15
Payer: MEDICARE

## 2025-09-15 PROCEDURE — 66984 XCAPSL CTRC RMVL W/O ECP: CPT | Mod: RT

## 2025-09-15 PROCEDURE — 66180 AQUEOUS SHUNT EYE W/GRAFT: CPT | Mod: RT

## 2025-09-16 ENCOUNTER — NON-APPOINTMENT (OUTPATIENT)
Age: 70
End: 2025-09-16

## 2025-09-16 ENCOUNTER — APPOINTMENT (OUTPATIENT)
Dept: OPHTHALMOLOGY | Facility: CLINIC | Age: 70
End: 2025-09-16
Payer: MEDICARE

## 2025-09-16 LAB
THYROGLOB AB SERPL-ACNC: 18 IU/ML
THYROPEROXIDASE AB SERPL IA-ACNC: 10.5 IU/ML

## 2025-09-16 PROCEDURE — 99024 POSTOP FOLLOW-UP VISIT: CPT

## 2025-09-17 RX ORDER — INSULIN GLARGINE 100 [IU]/ML
100 INJECTION, SOLUTION SUBCUTANEOUS
Refills: 0 | Status: ACTIVE | COMMUNITY
Start: 2025-09-17

## 2025-09-17 RX ORDER — INSULIN LISPRO 100 [IU]/ML
100 INJECTION, SOLUTION INTRAVENOUS; SUBCUTANEOUS
Refills: 0 | Status: ACTIVE | COMMUNITY
Start: 2025-09-17